# Patient Record
Sex: FEMALE | Race: BLACK OR AFRICAN AMERICAN | NOT HISPANIC OR LATINO | Employment: FULL TIME | URBAN - METROPOLITAN AREA
[De-identification: names, ages, dates, MRNs, and addresses within clinical notes are randomized per-mention and may not be internally consistent; named-entity substitution may affect disease eponyms.]

---

## 2017-03-03 ENCOUNTER — OFFICE VISIT (OUTPATIENT)
Dept: FAMILY MEDICINE | Facility: CLINIC | Age: 32
End: 2017-03-03

## 2017-03-03 VITALS
HEIGHT: 65 IN | TEMPERATURE: 97.7 F | DIASTOLIC BLOOD PRESSURE: 79 MMHG | BODY MASS INDEX: 39.42 KG/M2 | OXYGEN SATURATION: 98 % | WEIGHT: 236.6 LBS | HEART RATE: 97 BPM | SYSTOLIC BLOOD PRESSURE: 136 MMHG

## 2017-03-03 DIAGNOSIS — Z00.00 ROUTINE GENERAL MEDICAL EXAMINATION AT A HEALTH CARE FACILITY: Primary | ICD-10-CM

## 2017-03-03 LAB — HIV 1+2 AB+HIV1 P24 AG SERPL QL IA: NEGATIVE

## 2017-03-03 NOTE — PROGRESS NOTES
Female Physical Note    Concerns today: No special concerns today.    ROS:  CONSTITUTIONAL: no fatigue, no unexpected change in weight  SKIN: no worrisome rashes, no worrisome moles, no worrisome lesions  EYES: no acute vision problems or changes  ENT: no ear problems, no mouth problems, no throat problems  RESP: no significant cough, no shortness of breath  BREAST: no masses, no tenderness, no discharge  CV: no chest pain, no palpitations, no new or worsening peripheral edema  GI: Occasional nausea.No vomiting, no constipation, no diarrhea  GYN: dyspareunia with deep penetration, periods are regular and not too painful   : no frequency, no dysuria, no hematuria  MS: no claudication, no myalgias, no joint aches  NEURO: Occasional dizziness. no weakness, no syncope, no headaches  ENDOCRINE: no temperature intolerance, no skin/hair changes  HEME: no easy bruising, no bleeding problems  PSYCHIATRIC: NEGATIVE for changes in mood or affect    Sexually Active: Yes  Sexual concerns: No   Contraception: patch   P:   Menarche: 11 yo Patient's last menstrual period was 2017 (exact date). Menses: q 28 days  STD History: Pos   Last Pap Smear Date: 14, NSIL and neg HPV, due for repeat cotesting   Abnormal Pap History: 2/4/15 - atypical glandular cells; 3/11/15 - colposcopy without malignancy, recommended cotesting at 12 and 24 months.     Patient Active Problem List   Diagnosis     Tobacco dependence     History of snoring     Atypical glandular cells on cervical Pap smear     Morbid obesity (H)       Current Outpatient Prescriptions   Medication Sig Dispense Refill     norelgestromin-ethinyl estradiol (ORTHO EVRA) 150-35 MCG/24HR patch Place 1 patch onto the skin every 7 days 9 patch 12       Past Medical History   Diagnosis Date     History of colposcopy with cervical biopsy age 14     History of snoring 2015     Tobacco dependence 2015        Family History     Problem (# of Occurrences) Relation  (Name,Age of Onset)    CANCER (1) Maternal Grandfather    Colon Cancer (1) Maternal Grandmother    Coronary Artery Disease (2) Mother, Father    Heart Failure (2) Mother, Father: living    Hypertension (1) Mother: living    Liver Disease (1) Mother    Myocardial Infarction (1) Maternal Aunt: 2 aunts  in 50's     Ovarian Cancer (1) Sister    Pacemaker (1) Father       Negative family history of: DIABETES, CEREBROVASCULAR DISEASE, Thyroid Disease, Breast Cancer          Problem List Medication List and Allergy List were reviewed and updated.    Patient is an established patient of this clinic.    Social History   Substance Use Topics     Smoking status: Current Some Day Smoker     Types: Cigarettes     Smokeless tobacco: Not on file      Comment: Smokes mostly on the weekends since age 14.      Alcohol use 1.2 oz/week     2 Standard drinks or equivalent per week      Comment: Occasionally.     Cohabiting   Children ? yes 2 children    Has anyone hurt you physically, for example by pushing, hitting, slapping or kicking you or forcing you to have sex? Denies  Do you feel threatened or controlled by a partner, ex-partner or anyone in your life? Denies    RISK BEHAVIORS AND HEALTHY HABITS:  Tobacco Use/Smoking: Yes, occasionally on weekend.   Illicit Drug Use: None  Do you use alcohol? Occasionally on weekends, maximum of 4 in one sitting.   Diet (5-7 servings of fruits/veg daily): No special diet, not enough fruits or vegetables    Exercise (30 min accumulated most days): No, has a gym membership   Dental Care: Yes   Calcium 1500 mg/d:  Yes  Seat Belt Use: Yes     Pap/HPV cotest every 5 years for women 30-65   Recommended and patient accepted testing. and HIV screening:  Recommended and patient accepted testing.    Immunization History   Administered Date(s) Administered     Tdap (Adacel,Boostrix) 2010    Reviewed Immunization Record Today    EXAMINATION:   /79 (BP Location: Right arm, Patient Position:  "Chair, Cuff Size: Adult Large)  Pulse 97  Temp 97.7  F (36.5  C) (Oral)  Ht 5' 5\" (165.1 cm)  Wt 236 lb 9.6 oz (107.3 kg)  LMP 02/11/2017 (Exact Date)  SpO2 98%  BMI 39.37 kg/m2  GENERAL: healthy, alert and no distress  EYES: Eyes grossly normal to inspection, extraocular movements - intact, and PERRL  HENT: ear canals- normal; TMs- normal; Nose- normal; Mouth- no ulcers, no lesions  NECK: no tenderness, no adenopathy, no asymmetry, no masses, no stiffness; thyroid- normal to palpation  RESP: lungs clear to auscultation - no rales, no rhonchi, no wheezes  BREAST: no masses, no tenderness, no nipple discharge, no palpable axillary masses or adenopathy  CV: regular rates and rhythm, normal S1 S2, no S3 or S4 and no murmur, no click or rub -  ABDOMEN: soft, no tenderness, no  hepatosplenomegaly, no masses, normal bowel sounds  MS: extremities- no gross deformities noted, no edema  SKIN: no suspicious lesions, no rashes  NEURO: strength and tone- normal, sensory exam- grossly normal, mentation- intact, speech- normal, reflexes- symmetric  - female: cervix- normal, adnexae- normal; uterus- normal, no masses, no discharge  PSYCH: Alert and oriented times 3; speech- coherent , normal rate and volume; able to articulate logical thoughts, able to abstract reason, no tangential thoughts, no hallucinations or delusions, affect- normal  LYMPHATICS: ant. cervical- normal, post. cervical- normal, axillary- normal, supraclavicular- normal, inguinal- normal    ASSESSMENT & PLAN    1. Routine general medical examination at a health care facility  - GYN Cytology ([facility])  - Chlamydia/Gono Amplified ([facility])  - HIV Ag/Ab Screen [place] ([facility])    2. Tobacco Use Disorder - recommended smoking cessation.     Will call with lab results. Return to clinic as needed.     Precepted with attending physician Dr. Joel Fortune.     Johnna Herndon DO    Preceptor Attestation:  I saw and evaluated the patient.  I reviewed the " resident physician's history, exam, and treatment plan; and I agree with the documentation by the resident physician.  Supervising Physician:  Joel Fortune MD

## 2017-03-03 NOTE — MR AVS SNAPSHOT
After Visit Summary   3/3/2017    Ning Giraldo    MRN: [id number]           Patient Information     Date Of Birth          1985        Visit Information        Provider Department      3/3/2017 3:40 PM Johnna Herndon, DO [facility]        Today's Diagnoses     Routine general medical examination at a health care facility    -  1      Care Instructions      Preventive Health Recommendations  Female Ages 26 - 39  Yearly exam:   See your health care provider every year in order to    Review health changes.     Discuss preventive care.      Review your medicines if you your doctor has prescribed any.    Until age 30: Get a Pap test every three years (more often if you have had an abnormal result).    After age 30: Talk to your doctor about whether you should have a Pap test every 3 years or have a Pap test with HPV screening every 5 years.   You do not need a Pap test if your uterus was removed (hysterectomy) and you have not had cancer.  You should be tested each year for STDs (sexually transmitted diseases), if you're at risk.   Talk to your provider about how often to have your cholesterol checked.  If you are at risk for diabetes, you should have a diabetes test (fasting glucose).  Shots: Get a flu shot each year. Get a tetanus shot every 10 years.   Nutrition:     Eat at least 5 servings of fruits and vegetables each day.    Eat whole-grain bread, whole-wheat pasta and brown rice instead of white grains and rice.    Talk to your provider about Calcium and Vitamin D.     Lifestyle    Exercise at least 150 minutes a week (30 minutes a day, 5 days of the week). This will help you control your weight and prevent disease.    Limit alcohol to one drink per day.    No smoking.     Wear sunscreen to prevent skin cancer.    See your dentist every six months for an exam and cleaning.          Follow-ups after your visit        Who to contact     Please call your clinic at [phone]  "to:    Ask questions about your health    Make or cancel appointments    Discuss your medicines    Learn about your test results    Speak to your doctor   If you have compliments or concerns about an experience at your clinic, or if you wish to file a complaint, please contact [facility] Patient Relations at [phone] or email us at [email or web address]         Additional Information About Your Visit        [employer] Information     [employer] gives you secure access to your electronic health record. If you see a primary care provider, you can also send messages to your care team and make appointments. If you have questions, please call your primary care clinic.  If you do not have a primary care provider, please call [phone] and they will assist you.      [employer] is an electronic gateway that provides easy, online access to your medical records. With [employer], you can request a clinic appointment, read your test results, renew a prescription or communicate with your care team.     To access your existing account, please contact your [facility] or call [phone] for assistance.        Care EveryWhere ID     This is your Care EveryWhere ID. This could be used by other organizations to access your [place] medical records  [id number]        Your Vitals Were     Pulse Temperature Height Last Period Pulse Oximetry BMI (Body Mass Index)    97 97.7  F (36.5  C) (Oral) 5' 5\" (165.1 cm) 02/11/2017 (Exact Date) 98% 39.37 kg/m2       Blood Pressure from Last 3 Encounters:   03/03/17 136/79   08/04/16 115/73   06/01/16 122/79    Weight from Last 3 Encounters:   03/03/17 236 lb 9.6 oz (107.3 kg)   08/04/16 250 lb 8 oz (113.6 kg)   06/01/16 267 lb 6.4 oz (121.3 kg)              We Performed the Following     Chlamydia/Gono Amplified ([facility])     GYN Cytology ([facility])     HIV Ag/Ab Screen [place] ([facility])          Today's Medication Changes    "       These changes are accurate as of: 3/3/17  4:40 PM.  If you have any questions, ask your nurse or doctor.               Stop taking these medicines if you haven't already. Please contact your care team if you have questions.     cholecalciferol 1000 UNIT tablet   Commonly known as:  vitamin D   Stopped by:  Johnna Herndon, DO           metroNIDAZOLE 500 MG tablet   Commonly known as:  FLAGYL   Stopped by:  Johnna Herndon,            vitamin D 2000 UNITS tablet   Stopped by:  Johnna Herndon,            vitamin D 88370 UNIT capsule   Commonly known as:  ERGOCALCIFEROL   Stopped by:  Johnna Herndon, DO                    Primary Care Provider Office Phone # Fax #    Sintia Morales [phone]       [facility] [address] [place] [address]        Thank you!     Thank you for choosing [facility]  for your care. Our goal is always to provide you with excellent care. Hearing back from our patients is one way we can continue to improve our services. Please take a few minutes to complete the written survey that you may receive in the mail after your visit with us. Thank you!             Your Updated Medication List - Protect others around you: Learn how to safely use, store and throw away your medicines at www.disposemymeds.org.          This list is accurate as of: 3/3/17  4:40 PM.  Always use your most recent med list.                   Brand Name Dispense Instructions for use    norelgestromin-ethinyl estradiol 150-35 MCG/24HR patch    ORTHO EVRA    9 patch    Place 1 patch onto the skin every 7 days

## 2017-03-06 LAB
C TRACH RRNA CVX QL NAA+PROBE: NEGATIVE
N GONORRHOEA RRNA SPEC QL NAA+PROBE: NEGATIVE

## 2017-03-09 LAB
INTERPRETATION: NORMAL
INTERPRETER: NORMAL

## 2017-03-13 ENCOUNTER — RECORDS - HEALTHEAST (OUTPATIENT)
Dept: ADMINISTRATIVE | Facility: OTHER | Age: 32
End: 2017-03-13

## 2017-03-13 DIAGNOSIS — Z30.49 ENCOUNTER FOR SURVEILLANCE OF OTHER CONTRACEPTIVE: ICD-10-CM

## 2017-03-13 LAB
HIGH RISK?: YES
HPV REFLEX?: NORMAL
LAB AP ABNORMAL BLEEDING: NO
LAB AP BIRTH CONTROL/HORMONES: NORMAL
LAB AP CASE REPORT: NORMAL
LAB AP CERVICAL APPEARANCE: NORMAL
LAB AP GYN INTERPRETATION: NORMAL
LAB AP MALIGNANT?: NORMAL
LAB AP PATIENT STATUS: NORMAL
LAB AP PREVIOUS ABNL: NORMAL
LAB AP PREVIOUS NORMAL: NORMAL
LAST MENS PERIOD: NORMAL
SPECIMEN ADEQUACY:: NORMAL

## 2017-03-14 RX ORDER — NORELGESTROMIN AND ETHINYL ESTRADIOL 35; 150 UG/MG; UG/MG
1 PATCH TRANSDERMAL
Qty: 9 PATCH | Refills: 5 | Status: SHIPPED
Start: 2017-03-14 | End: 2018-01-19

## 2017-09-20 ENCOUNTER — OFFICE VISIT (OUTPATIENT)
Dept: FAMILY MEDICINE | Facility: CLINIC | Age: 32
End: 2017-09-20

## 2017-09-20 VITALS
DIASTOLIC BLOOD PRESSURE: 79 MMHG | RESPIRATION RATE: 20 BRPM | SYSTOLIC BLOOD PRESSURE: 122 MMHG | HEIGHT: 65 IN | WEIGHT: 226.4 LBS | BODY MASS INDEX: 37.72 KG/M2 | TEMPERATURE: 98 F | HEART RATE: 79 BPM | OXYGEN SATURATION: 100 %

## 2017-09-20 DIAGNOSIS — M79.604 PAIN OF RIGHT LOWER EXTREMITY: Primary | ICD-10-CM

## 2017-09-20 LAB
% GRANULOCYTES: 41.2 %G (ref 40–75)
GRANULOCYTES #: 2.1 K/UL (ref 1.6–8.3)
HCT VFR BLD AUTO: 40.7 % (ref 35–47)
HEMOGLOBIN: 12.4 G/DL (ref 11.7–15.7)
LYMPHOCYTES # BLD AUTO: 2.5 K/UL (ref 0.8–5.3)
LYMPHOCYTES NFR BLD AUTO: 49.7 %L (ref 20–48)
MCH RBC QN AUTO: 28.3 PG (ref 26.5–35)
MCHC RBC AUTO-ENTMCNC: 30.5 G/DL (ref 32–36)
MCV RBC AUTO: 93 FL (ref 78–100)
MID #: 0.5 K/UL (ref 0–2.2)
MID %: 9.1 %M (ref 0–20)
PLATELET # BLD AUTO: 324 K/UL (ref 150–450)
RBC # BLD AUTO: 4.38 M/UL (ref 3.8–5.2)
WBC # BLD AUTO: 5.1 K/UL (ref 4–11)

## 2017-09-20 NOTE — PROGRESS NOTES
"       HPI:   Ning Giraldo is a 31 year old  female who presents to clinic today for Pain of the right knee/anterior shin for approximately 2 week duration.  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  The pain is made worse with activity; however it continues to hurt at rest as well.  The pain is particularly worse with plantar flexion.    She has not had prolonged periods of bedrest.  She does not have any personal or family history of bleeding or clotting disorders.  She has been on the patch birth control for approximately 6 years.  She does smoke 1 pack per week, drinks approximately 6 drinks on the weekend 3 at a time, and does not use other drugs.    She has tried heat with little benefit.  She has not tried Tylenol, ibuprofen, or other therapies.    She has had some associated fatigue for approximately 1 week.  She denies fever, chills, joint swelling, hip or right ankle pain, rash, inguinal lymphadenopathy, or other symptoms. She has pain chronically in her left ankle.    She has a family member that has recently diagnosed with lymphoma after having \"atypical symptoms\" and wanted to get \"checked out\"    States she has had history of fracture of the left foot but has never been documented or seen by a physician.  This occurred following a fall.  She does not think of any one incident that started her current right knee pain.    Declines work note for any restrictions.    Patient is an established patient of this clinic.         PMHX:   Active Problems List  Patient Active Problem List   Diagnosis     Tobacco dependence     History of snoring     Atypical glandular cells on cervical Pap smear     Morbid obesity (H)     Active problem list reviewed and updated.    Current Medications  Current Outpatient Prescriptions   Medication Sig Dispense Refill     norelgestromin-ethinyl " "estradiol (ORTHO EVRA) 150-35 MCG/24HR patch Place 1 patch onto the skin every 7 days 9 patch 5     Medication list reviewed and updated.    Family History  No personal or family history of bleeding/clotting disorders known to patient    Family history reviewed and updated.  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  Social History   Substance Use Topics     Smoking status: Current Some Day Smoker     Types: Cigarettes     Smokeless tobacco: Not on file      Comment: Occasionally.      Alcohol use      6 Standard drinks or equivalent per week      Comment: Occasionally.     History   Drug Use No     Allergies  No Known Allergies  Allergies and Medication Intolerances Updated         Physical Exam:     Vitals:    09/20/17 1621   BP: 122/79   BP Location: Right arm   Patient Position: Chair   Cuff Size: Adult Large   Pulse: 79   Resp: 20   Temp: 98  F (36.7  C)   TempSrc: Oral   SpO2: 100%   Weight: 226 lb 6.4 oz (102.7 kg)   Height: 5' 5.25\" (165.7 cm)     Body mass index is 37.39 kg/(m^2).    General: Obese female. Appears well and in no acute distress.  HEENT: Eyes grossly normal to inspection. Extraocular movements intact. Pupils equal, round, and reactive to light. Mucous membranes moist. No ulcers or lesions noted in the oropharynx.  Cardiovascular: Regular rate and rhythm, normal S1 and S2 without murmur. No extra heartsounds or friction rub. Radial pulses present and equal bilaterally.  Respiratory: Lungs clear to auscultation bilaterally. No wheezing or crackles. No prolonged expiration. Symmetrical chest rise.  Knee Musculoskeletal Exam:  Grossly normal in appearance. No overlying erythema or ecchymosis. No swelling noted. No tenderness to palpation over joint line. Full ROM with extension and flexion. Negative Lachman's and posterior drawer. No laxity with varus and valgus stress. Negative McMurrays. No patellar apprehension or pain with patellar compression. Pes anserine bursa " and fibular head nontender. Calves measure equal bilaterally.  Derm: No suspicious lesions or rashes of lower extremity.    Assessment and Plan   1. Pain of right lower extremity: Exam findings mostly benign. No overlying rash or focal tenderness. Unable to reproduce pain on exam and structures appear to be intact. Possibly overuse injury do to new job. No hx of trauma so fracture unlikely. Unlikely lymphoma, but can check CBC for patient reassurance. Unlikely DVT or thrombophlebitis given no prolonged periods of rest, leg swelling, or focal tenderness.  - CBC with Diff Plt ([facility]), f/u results  - Heat, tylenol, ibuprofen PRN for pain  - Return PRN if symptoms change or worsen for re-evaluation    Options for treatment and follow-up care were reviewed with the patient and/or guardian. Ning CANNON Giraldo and/or guardian engaged in the decision making process and verbalized understanding of the options discussed and agreed with the final plan.    Juan Holder MD  [facility] Resident  Pager# [phone]    Precepted with: Romaine Land MD      This chart is completed utilizing dictation software; typos and/or incorrect word substitutions may unintentionally occur.

## 2017-09-20 NOTE — MR AVS SNAPSHOT
After Visit Summary   9/20/2017    Ning Giraldo    MRN: [id number]           Patient Information     Date Of Birth          1985        Visit Information        Provider Department      9/20/2017 4:20 PM Juan Holder MD [facility]        Today's Diagnoses     Pain of right lower extremity    -  1      Care Instructions    Important Takeaway Points From This Visit:    We will call with your lab results    If your pain worsens, becomes more localized to one area, or you have new symptoms I'd like to see you back for another evaluation    You can take tylenol or ibuprofen as needed for pain    As always, please call with any questions or concerns. I look forward to seeing you again soon!    Take care,  Dr. Holder    Your current medication list is printed. Please keep this with you - it is helpful to bring this current list to any other medical appointments. It can also be helpful if you ever go to the emergency room or hospital.    If you had lab testing today we will call you with the results. The phone number we will call with your results is # [phone] (home) . If this is not the best number please call our clinic and change the number.    If you need any refills, please call your pharmacy and they will contact us.    If you have any further concerns or wish to schedule another appointment, please call our office at [phone] during normal business hours (8-5, M-F).    If you have urgent medical questions that cannot wait, you may call [phone] at any time of day.    If you have a medical emergency, please call [phone].    Thank you for coming to [facility].              Follow-ups after your visit        Who to contact     Please call your clinic at [phone] to:    Ask questions about your health    Make or cancel appointments    Discuss your medicines    Learn about your test results    Speak to your doctor   If you have compliments or concerns  "about an experience at your clinic, or if you wish to file a complaint, please contact [facility] Patient Relations at [phone] or email us at [email or web address]         Additional Information About Your Visit        [employer] Information     [employer] gives you secure access to your electronic health record. If you see a primary care provider, you can also send messages to your care team and make appointments. If you have questions, please call your primary care clinic.  If you do not have a primary care provider, please call [phone] and they will assist you.      [employer] is an electronic gateway that provides easy, online access to your medical records. With [employer], you can request a clinic appointment, read your test results, renew a prescription or communicate with your care team.     To access your existing account, please contact your [facility] or call [phone] for assistance.        Care EveryWhere ID     This is your Care EveryWhere ID. This could be used by other organizations to access your [place] medical records  [id number]        Your Vitals Were     Pulse Temperature Respirations Height Last Period Pulse Oximetry    79 98  F (36.7  C) (Oral) 20 5' 5.25\" (165.7 cm) 09/15/2017 100%    BMI (Body Mass Index)                   37.39 kg/m2            Blood Pressure from Last 3 Encounters:   09/20/17 122/79   03/03/17 136/79   08/04/16 115/73    Weight from Last 3 Encounters:   09/20/17 226 lb 6.4 oz (102.7 kg)   03/03/17 236 lb 9.6 oz (107.3 kg)   08/04/16 250 lb 8 oz (113.6 kg)              We Performed the Following     CBC with Diff Plt ([facility])        Primary Care Provider Office Phone # Fax #    Katie Rangel [phone]       [facility] [address] [place] [address]        Equal Access to Services     SAMUEL HOLBROOK AH: Hadii david riverao Soomaali, waaxda luqadaha, qaybta " maddi haroandrey miguel. So [facility] [phone].    ATENCIÓN: Si julito loyola, tiene a gardner disposición servicios gratuitos de asistencia lingüística. David al [phone].    We comply with applicable federal civil rights laws and [place] laws. We do not discriminate on the basis of race, color, national origin, age, disability sex, sexual orientation or gender identity.            Thank you!     Thank you for choosing [facility]  for your care. Our goal is always to provide you with excellent care. Hearing back from our patients is one way we can continue to improve our services. Please take a few minutes to complete the written survey that you may receive in the mail after your visit with us. Thank you!             Your Updated Medication List - Protect others around you: Learn how to safely use, store and throw away your medicines at www.disposemymeds.org.          This list is accurate as of: 9/20/17 11:59 PM.  Always use your most recent med list.                   Brand Name Dispense Instructions for use Diagnosis    norelgestromin-ethinyl estradiol 150-35 MCG/24HR patch    ORTHO EVRA    9 patch    Place 1 patch onto the skin every 7 days    Encounter for surveillance of other contraceptive

## 2017-09-20 NOTE — PATIENT INSTRUCTIONS
Important Takeaway Points From This Visit:    We will call with your lab results    If your pain worsens, becomes more localized to one area, or you have new symptoms I'd like to see you back for another evaluation    You can take tylenol or ibuprofen as needed for pain    As always, please call with any questions or concerns. I look forward to seeing you again soon!    Take care,  Dr. Holder    Your current medication list is printed. Please keep this with you - it is helpful to bring this current list to any other medical appointments. It can also be helpful if you ever go to the emergency room or hospital.    If you had lab testing today we will call you with the results. The phone number we will call with your results is # [phone] (home) . If this is not the best number please call our clinic and change the number.    If you need any refills, please call your pharmacy and they will contact us.    If you have any further concerns or wish to schedule another appointment, please call our office at [phone] during normal business hours (8-5, M-F).    If you have urgent medical questions that cannot wait, you may call [phone] at any time of day.    If you have a medical emergency, please call [phone].    Thank you for coming to [facility].

## 2017-09-20 NOTE — PROGRESS NOTES
Preceptor Attestation:  Patient's case reviewed and discussed with Juan Holder MD Patient seen and discussed with the resident.. I agree with assessment and plan of care.  Supervising Physician:  Romaine Land MD  [facility]

## 2017-11-17 ENCOUNTER — OFFICE VISIT (OUTPATIENT)
Dept: FAMILY MEDICINE | Facility: CLINIC | Age: 32
End: 2017-11-17

## 2017-11-17 VITALS
TEMPERATURE: 98.1 F | BODY MASS INDEX: 36.74 KG/M2 | OXYGEN SATURATION: 98 % | WEIGHT: 228.6 LBS | DIASTOLIC BLOOD PRESSURE: 78 MMHG | SYSTOLIC BLOOD PRESSURE: 118 MMHG | HEART RATE: 90 BPM | HEIGHT: 66 IN

## 2017-11-17 DIAGNOSIS — M25.571 PAIN IN JOINT, ANKLE AND FOOT, RIGHT: ICD-10-CM

## 2017-11-17 DIAGNOSIS — M25.50 MULTIPLE JOINT PAIN: Primary | ICD-10-CM

## 2017-11-17 DIAGNOSIS — N92.6 IRREGULAR MENSES: ICD-10-CM

## 2017-11-17 NOTE — PATIENT INSTRUCTIONS
- take ibuprofen or tylenol   - ice, rest at the end of the day when able  - we will call you next week to set up an appointment for physical therapy  - keep taking the patch for birth control

## 2017-11-17 NOTE — PROGRESS NOTES
"   Subjective:   Ning Giraldo is a 31 year old  female with obesity who presents for:    Joint pain, leg pain, bones  - all joints, 1.5 weeks, worse with cold weather  - both anterior lower legs, left elbow; none in hands  - worse at end of days, improves with relaxing over weekend, worse throughout the week  - h/o right ankle injury years ago falling down stairs; on feet a lot; never saw doctor for this after injuries; some swelling  - advil less than once per day, OTC x 1 pill; tylenol doesn't work  - ice, elevate, rest on weekends    Abnormal bleeding  - light bleeding x 2-3 wks  - on patch for BC but off x 1 month, restarted last week; LMP 1 week ago 11/11, was only 2 days of spotting; last month had spotting -then real period  - some cramping with period  - sister with ovarian cancer at age 40    Sleep   - tired, trouble breathing at night every once in a while    Tobacco  - 1 PPweek    Labs  - cousin with leukemia; concerned about abnormal tests    Social/other: works with adults with disabilities, [employer]    ROS negative other than mentioned in HPI   History and medications listed below  Objective:   /78  Pulse 90  Temp 98.1  F (36.7  C) (Oral)  Ht 5' 5.5\" (166.4 cm)  Wt 228 lb 9.6 oz (103.7 kg)  LMP 11/11/2017  SpO2 98%  BMI 37.46 kg/m2  Body mass index is 37.46 kg/(m^2).  Gen: alert, NAD  HENT: oral mucosa moist  Card: RRR, no murmurs  Resp: CTAB, no wheezing or crackles  Abd: soft; nontender  MS: extremities grossly normal; no swelling, erythema, or joint line tenderness in hands, elbows, or knees  Ext: no LE edema; no right ankle swelling, some tenderness of right medial ankle along talotibial joint line; flat feet  Skin: no rashes on exposed skin  Neuro: mentation intact, speech normal  Psych: mood normal, affect normal  Assessment and Plan     1. Multiple limb pain  Describes as joint pain, but also muscle/limb pain without clear pattern at this point. No signs/symptoms to suggest " rheumatoid arthritis or even osteoarthritis at this time. 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   - ibuprofen/tylenol for pain prn  - ice/rest as able    2. Pain in joint, ankle and foot, right  Remote history of 2 injuries that sound minor, likely sprains/strains. However, continues to have intermittent pain and swelling. Xray would not  as she has been walking on it for years and exam is not concerning.  - PHYSICAL THERAPY REFERRAL  - supportive shoes/insoles    3. Irregular menses  Not truly irregular. Normal variation with birth control. Not interested in other options.   - continue patch; should quit smoking in next year or so if wants to continue using estrogen-containing contraception    Follow up: in 1-2 weeks as able to discuss sleep issues and tobacco cessation    Katie Rangel MD, MPH  [facility] Medicine Resident    Precepted with: Beulah Leon MD    Options for treatment and follow-up care were reviewed with the patient and/or guardian. Ning CANNON Giraldo and/or guardian engaged in the decision making process and verbalized understanding of the options discussed and agreed with the final plan.  PMHX:     Patient Active Problem List   Diagnosis     Tobacco dependence     History of snoring     Atypical glandular cells on cervical Pap smear     Morbid obesity (H)     Current Outpatient Prescriptions   Medication Sig Dispense Refill     norelgestromin-ethinyl estradiol (ORTHO EVRA) 150-35 MCG/24HR patch Place 1 patch onto the skin every 7 days 9 patch 5     Family History   Problem Relation Age of Onset     Colon Cancer Maternal Grandmother      Heart Failure Mother      Liver Disease Mother      Hypertension Mother      living     Coronary Artery Disease Mother      Heart Failure Father      living     Coronary Artery Disease Father      Pacemaker Father      Myocardial Infarction Maternal Aunt      2 aunts  in 50's      Ovarian Cancer Sister       CANCER Maternal Grandfather      DIABETES No family hx of      CEREBROVASCULAR DISEASE No family hx of      Thyroid Disease No family hx of      Breast Cancer No family hx of      Social History     Social History Narrative    Children: Jayesh Stevo 5yo, Tyrses Stevo 14yo     No Known Allergies  No results found for this or any previous visit (from the past 24 hour(s)).

## 2017-11-17 NOTE — MR AVS SNAPSHOT
After Visit Summary   11/17/2017    Ning Giraldo    MRN: [id number]           Patient Information     Date Of Birth          1985        Visit Information        Provider Department      11/17/2017 3:40 PM Katie Rangel MD [facility]        Care Instructions    - take ibuprofen or tylenol   - ice, rest at the end of the day when able  - we will call you next week to set up an appointment for physical therapy  - keep taking the patch for birth control          Follow-ups after your visit        Who to contact     Please call your clinic at [phone] to:    Ask questions about your health    Make or cancel appointments    Discuss your medicines    Learn about your test results    Speak to your doctor   If you have compliments or concerns about an experience at your clinic, or if you wish to file a complaint, please contact [facility] Patient Relations at [phone] or email us at [email or web address]         Additional Information About Your Visit        MyChart Information     ecomomt gives you secure access to your electronic health record. If you see a primary care provider, you can also send messages to your care team and make appointments. If you have questions, please call your primary care clinic.  If you do not have a primary care provider, please call [phone] and they will assist you.      LIQVID is an electronic gateway that provides easy, online access to your medical records. With LIQVID, you can request a clinic appointment, read your test results, renew a prescription or communicate with your care team.     To access your existing account, please contact your [facility] Clinic or call [phone] for assistance.        Care EveryWhere ID     This is your Care EveryWhere ID. This could be used by other organizations to access your [place] medical records  [id number]        Your Vitals Were   "   Pulse Temperature Height Last Period Pulse Oximetry BMI (Body Mass Index)    90 98.1  F (36.7  C) (Oral) 5' 5.5\" (166.4 cm) 11/11/2017 98% 37.46 kg/m2       Blood Pressure from Last 3 Encounters:   11/17/17 118/78   09/20/17 122/79   03/03/17 136/79    Weight from Last 3 Encounters:   11/17/17 228 lb 9.6 oz (103.7 kg)   09/20/17 226 lb 6.4 oz (102.7 kg)   03/03/17 236 lb 9.6 oz (107.3 kg)              Today, you had the following     No orders found for display       Primary Care Provider Office Phone # Fax #    Katie Rangel [phone]       [facility] [address] [place] [address]        Equal Access to Services     [facility]: Hadii aad ku hadasho S[place]eter, waaxda luqadaha, qaybta kaalmada adeegyada, maddi riddle . So [facility] [phone].    ATENCIÓN: Si habla español, tiene a gardner disposición servicios gratuitos de asistencia lingüística. Llame al [phone].    We comply with applicable federal civil rights laws and [place] laws. We do not discriminate on the basis of race, color, national origin, age, disability, sex, sexual orientation, or gender identity.            Thank you!     Thank you for choosing [facility]  for your care. Our goal is always to provide you with excellent care. Hearing back from our patients is one way we can continue to improve our services. Please take a few minutes to complete the written survey that you may receive in the mail after your visit with us. Thank you!             Your Updated Medication List - Protect others around you: Learn how to safely use, store and throw away your medicines at www.disposemymeds.org.          This list is accurate as of: 11/17/17  5:12 PM.  Always use your most recent med list.                   Brand Name Dispense Instructions for use Diagnosis    norelgestromin-ethinyl estradiol 150-35 MCG/24HR patch    ORTHO EVRA    9 patch    Place 1 patch onto the skin " every 7 days    Encounter for surveillance of other contraceptive

## 2017-11-20 NOTE — PROGRESS NOTES
Preceptor Attestation:  Patient's case reviewed and discussed with Katie Rangel MD resident and I evaluated the patient. I agree with written assessment and plan of care.  Supervising Physician:  SAL CABEZAS MD  [facility]

## 2018-01-15 ENCOUNTER — TELEPHONE (OUTPATIENT)
Dept: FAMILY MEDICINE | Facility: CLINIC | Age: 33
End: 2018-01-15

## 2018-01-15 NOTE — TELEPHONE ENCOUNTER
Ed follow up    Pt seen on 1.9 at [facility] foot/ankle pain - possible sprain    Unable to reach pt but left message  krystle

## 2018-01-15 NOTE — TELEPHONE ENCOUNTER
ED follow up    2nd attempt to reach pt for follow up on ankle and foot pain  Seen at [facility] and has a hx of ankle and joint pain    lbetz

## 2018-01-16 ENCOUNTER — TELEPHONE (OUTPATIENT)
Dept: FAMILY MEDICINE | Facility: CLINIC | Age: 33
End: 2018-01-16

## 2018-01-16 NOTE — TELEPHONE ENCOUNTER
ED follow up      Attempted to reach pt for follow up on visit to [facility] ED  Had to leave message to see how ankle is post sprain    Lbetz

## 2018-01-19 ENCOUNTER — OFFICE VISIT (OUTPATIENT)
Dept: FAMILY MEDICINE | Facility: CLINIC | Age: 33
End: 2018-01-19
Payer: COMMERCIAL

## 2018-01-19 VITALS
WEIGHT: 233.6 LBS | BODY MASS INDEX: 37.54 KG/M2 | OXYGEN SATURATION: 100 % | HEART RATE: 73 BPM | HEIGHT: 66 IN | DIASTOLIC BLOOD PRESSURE: 85 MMHG | SYSTOLIC BLOOD PRESSURE: 135 MMHG | TEMPERATURE: 97.5 F

## 2018-01-19 DIAGNOSIS — M21.42 ACQUIRED PES PLANUS OF BOTH FEET: ICD-10-CM

## 2018-01-19 DIAGNOSIS — E66.01 MORBID OBESITY (H): ICD-10-CM

## 2018-01-19 DIAGNOSIS — Z30.49 ENCOUNTER FOR SURVEILLANCE OF OTHER CONTRACEPTIVE: ICD-10-CM

## 2018-01-19 DIAGNOSIS — M21.41 ACQUIRED PES PLANUS OF BOTH FEET: ICD-10-CM

## 2018-01-19 DIAGNOSIS — M25.571 PAIN IN JOINT INVOLVING ANKLE AND FOOT, RIGHT: Primary | ICD-10-CM

## 2018-01-19 RX ORDER — NORELGESTROMIN AND ETHINYL ESTRADIOL 35; 150 UG/MG; UG/MG
1 PATCH TRANSDERMAL
Qty: 9 PATCH | Refills: 1 | Status: SHIPPED | OUTPATIENT
Start: 2018-01-19 | End: 2018-04-19

## 2018-01-19 NOTE — PROGRESS NOTES
"   Subjective:   Ning Giraldo is a 32 year old  female who presents for:    EDF for R ankle renard  - 1/9 ED visit . Had x-ray that showed no fracture or dislocation.  - Ankle has been injured years ago falling down stairs. Decided to go in because it bothered her in the night with aching and shooting pain. 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 Took 1 pill/day. Ice and epson salt baths help as well  - Improved somewhat since ED visit. No longer bothering her at night.   - Worse in the middle of the week  - Hasn't tried PT before  - Some numbness in foot    ROS negative other than mentioned in HPI   History and medications listed below  Objective:   /85 (BP Location: Right arm, Patient Position: Chair, Cuff Size: Adult Large)  Pulse 73  Temp 97.5  F (36.4  C) (Oral)  Ht 5' 6.25\" (168.3 cm)  Wt 233 lb 9.6 oz (106 kg)  LMP 01/05/2018  SpO2 100%  BMI 37.42 kg/m2  Body mass index is 37.42 kg/(m^2).  Gen: no distress,   Ext: Some pain to palpation of the R medial malleolus / medial right tibiotalar joint line. Full ROM of R ankle. No obvious swelling. No pain on palpation of tendons or ligaments. No laxity with anterior or posterior drawer  Cards; RRR, no murmur  Resp: clear, no wheezing  Assessment and Plan   1. Pain in joint involving ankle and foot, right; Acquired pes planus of both feet  Since her visit to the ED her ankle has returned to the baseline pain she has had for the past several years since sustaining a fall on the stairs. Has been to clinic in the past (most recently in Nov) for this pain and it was recommended to get inserts for arch supports and " referral for PT was placed. It seems her pain is due to chronic injury that hasn't recovered in combination with pes planus and morbid obesity causing further stress on the ankle. Discussed getting arch supports and attending PT and she is interested in this. Also discussed weight-loss. 10/10 enthusiastic about PT.  - PHYSICAL THERAPY REFERRAL  - superfeet orthotics  - supportive shoe  - wear shoes inside  - weight loss  - Can continue supportive measures: Advil, ice    2. Encounter for surveillance of other contraceptive  Currently on OCP. Refilled today for 6mo and counseled that she no longer could be on this form at the age of 33 given that she is a smoker. Also discussed smoking cessation. Will follow-up to discuss smoking cessation and birth control options  - norelgestromin-ethinyl estradiol (ORTHO EVRA) 150-35 MCG/24HR patch; Place 1 patch onto the skin every 7 days  Dispense: 9 patch; Refill: 1    3. Tobacco dependence  Needs to quit by next visit or change to different birth control. Discussed risk of blood clot. Encouraged setting a quit date and coming in to discuss cessation aids. Smokes infrequently socially.    Follow up: about 6 mo    Katie Szymanksi MD, MPH  [facility] Resident     This note is scribed by Selin Barnhart, medical student on behalf of KATIE SZYMANSKI.    Precepted with: Hanna Palacio MD    Options for treatment and follow-up care were reviewed with the patient and/or guardian. Ning CANNON Giraldo and/or guardian engaged in the decision making process and verbalized understanding of the options discussed and agreed with the final plan.  PMHX:     Patient Active Problem List   Diagnosis     Tobacco dependence     History of snoring     Atypical glandular cells on cervical Pap smear     Morbid obesity (H)     Current Outpatient Prescriptions   Medication Sig Dispense Refill     norelgestromin-ethinyl estradiol (ORTHO EVRA) 150-35 MCG/24HR patch Place 1 patch onto the skin  every 7 days 9 patch 5     Family History   Problem Relation Age of Onset     Colon Cancer Maternal Grandmother      Heart Failure Mother      Liver Disease Mother      Hypertension Mother      living     Coronary Artery Disease Mother      Heart Failure Father      living     Coronary Artery Disease Father      Pacemaker Father      Myocardial Infarction Maternal Aunt      2 aunts  in 50's      Ovarian Cancer Sister      CANCER Maternal Grandfather      DIABETES No family hx of      CEREBROVASCULAR DISEASE No family hx of      Thyroid Disease No family hx of      Breast Cancer No family hx of      Social History     Social History Narrative    Children: Jayesh Stevo 5yo, Tyrses Stevo 12yo     No Known Allergies  No results found for this or any previous visit (from the past 24 hour(s)).

## 2018-01-19 NOTE — PROGRESS NOTES
Preceptor Attestation:  Patient's case reviewed and discussed with  Patient seen and discussed with the resident.  I agree with written assessment and plan of care.  Supervising Physician:  Hanna Palacio MD  [facility]

## 2018-01-19 NOTE — MR AVS SNAPSHOT
"              After Visit Summary   1/19/2018    Ning Giraldo    MRN: [id number]           Patient Information     Date Of Birth          1985        Visit Information        Provider Department      1/19/2018 4:20 PM Katie Rangel MD [facility]        Today's Diagnoses     Pain in joint involving ankle and foot, right    -  1    Morbid obesity (H)        Encounter for surveillance of other contraceptive          Care Instructions    - we will call you to set up a physical therapy appointment. Please call the clinic to check in on this if you haven't heard from us by Wednesday of next week.   - try the most supportive arch supports from \"Superfeet\" (I think the green ones)  - continue using ice for 20 minutes at least daily, more often if possible  - okay to use tylenol or ibuprofen 2-3 times per day if helpful          Follow-ups after your visit        Additional Services     PHYSICAL THERAPY REFERRAL       PT/OT REFERRAL  Nign Giraldo  1985  Phone #: [phone] (home)    needed: No  Language: English    PT/OT  Facility:   [facility], P: [phone], F: [phone]    History: ankle injury 2 years ago, persistent pain with weightbearing and walking    Precautions/Contraindications: None    Imaging Studies: X-Ray    Surgical Procedure/Test Results: None    Treatment Goals:   Increase: Flexibility and Strength  Decrease: Pain    Prognosis: good    Visits: Up to 12    Evaluate: Evaluate and treat    Plan: Manual Therapy, Flexibility Exercise and Strength Exercise    Use of Ionto/Phonophoresis Approved - patient requires a separate medication prescription. Dexamethasone 4 mg/ml injectable - 10cc                  Follow-up notes from your care team     Return if symptoms worsen or fail to improve, for Follow up ankle pain.      Who to contact     Please call your clinic at [phone] to:    Ask questions about your health    Make or cancel " "appointments    Discuss your medicines    Learn about your test results    Speak to your doctor   If you have compliments or concerns about an experience at your clinic, or if you wish to file a complaint, please contact [facility] Patient Relations at [phone] or email us at [email or web address]         Additional Information About Your Visit        ERPLYhart Information     Prosonixt gives you secure access to your electronic health record. If you see a primary care provider, you can also send messages to your care team and make appointments. If you have questions, please call your primary care clinic.  If you do not have a primary care provider, please call [phone] and they will assist you.      Desk is an electronic gateway that provides easy, online access to your medical records. With Desk, you can request a clinic appointment, read your test results, renew a prescription or communicate with your care team.     To access your existing account, please contact your [facility] or call [phone] for assistance.        Care EveryWhere ID     This is your Care EveryWhere ID. This could be used by other organizations to access your [place] medical records  [id number]        Your Vitals Were     Pulse Temperature Height Last Period Pulse Oximetry BMI (Body Mass Index)    73 97.5  F (36.4  C) (Oral) 5' 6.25\" (168.3 cm) 01/05/2018 100% 37.42 kg/m2       Blood Pressure from Last 3 Encounters:   01/19/18 135/85   11/17/17 118/78   09/20/17 122/79    Weight from Last 3 Encounters:   01/19/18 233 lb 9.6 oz (106 kg)   11/17/17 228 lb 9.6 oz (103.7 kg)   09/20/17 226 lb 6.4 oz (102.7 kg)              We Performed the Following     PHYSICAL THERAPY REFERRAL          Where to get your medicines      These medications were sent to [facility] [id number] - [place], [place] - [address] AT [facility]  [address], [place] [address]     Phone:  [phone]     norelgestromin-ethinyl estradiol 150-35 MCG/24HR patch          Primary Care Provider Office Phone # Fax #    Katie Emili Rangel [phone]       [facility] [address] [place] [address] [address] [place] [address]        Equal Access to Services     [facility]: Hadii aad ku hadasho Soomaali, waaxda luqadaha, qaybta kaalmada adeegyada, waxay idiin hayaan adeeg kharash la'aan ah. So [facility] [phone].    ATENCIÓN: Si habla español, tiene a gardner disposición servicios gratuitos de asistencia lingüística. [facility] [phone].    We comply with applicable federal civil rights laws and [place] laws. We do not discriminate on the basis of race, color, national origin, age, disability, sex, sexual orientation, or gender identity.            Thank you!     Thank you for choosing [facility]  for your care. Our goal is always to provide you with excellent care. Hearing back from our patients is one way we can continue to improve our services. Please take a few minutes to complete the written survey that you may receive in the mail after your visit with us. Thank you!             Your Updated Medication List - Protect others around you: Learn how to safely use, store and throw away your medicines at www.disposemymeds.org.          This list is accurate as of: 1/19/18  5:46 PM.  Always use your most recent med list.                   Brand Name Dispense Instructions for use Diagnosis    norelgestromin-ethinyl estradiol 150-35 MCG/24HR patch    ORTHO EVRA    9 patch    Place 1 patch onto the skin every 7 days    Encounter for surveillance of other contraceptive

## 2018-01-19 NOTE — PATIENT INSTRUCTIONS
"- we will call you to set up a physical therapy appointment. Please call the clinic to check in on this if you haven't heard from us by Wednesday of next week.   - try the most supportive arch supports from \"Superfeet\" (I think the green ones)  - continue using ice for 20 minutes at least daily, more often if possible  - okay to use tylenol or ibuprofen 2-3 times per day if helpful  "

## 2018-01-20 PROBLEM — M21.42 ACQUIRED PES PLANUS OF BOTH FEET: Status: ACTIVE | Noted: 2018-01-20

## 2018-01-20 PROBLEM — Z30.49 ENCOUNTER FOR SURVEILLANCE OF OTHER CONTRACEPTIVE: Status: ACTIVE | Noted: 2018-01-20

## 2018-01-20 PROBLEM — M21.41 ACQUIRED PES PLANUS OF BOTH FEET: Status: ACTIVE | Noted: 2018-01-20

## 2018-01-23 ENCOUNTER — AMBULATORY - HEALTHEAST (OUTPATIENT)
Dept: ADMINISTRATIVE | Facility: REHABILITATION | Age: 33
End: 2018-01-23

## 2018-01-23 DIAGNOSIS — M25.579 PAIN IN JOINT INVOLVING ANKLE AND FOOT: ICD-10-CM

## 2018-03-23 ENCOUNTER — OFFICE VISIT (OUTPATIENT)
Dept: FAMILY MEDICINE | Facility: CLINIC | Age: 33
End: 2018-03-23
Payer: COMMERCIAL

## 2018-03-23 VITALS
TEMPERATURE: 97.9 F | WEIGHT: 233 LBS | DIASTOLIC BLOOD PRESSURE: 93 MMHG | HEIGHT: 66 IN | OXYGEN SATURATION: 99 % | HEART RATE: 83 BPM | BODY MASS INDEX: 37.45 KG/M2 | SYSTOLIC BLOOD PRESSURE: 139 MMHG | RESPIRATION RATE: 16 BRPM

## 2018-03-23 DIAGNOSIS — Z00.00 ROUTINE GENERAL MEDICAL EXAMINATION AT A HEALTH CARE FACILITY: Primary | ICD-10-CM

## 2018-03-23 DIAGNOSIS — R35.89 POLYURIA: ICD-10-CM

## 2018-03-23 DIAGNOSIS — R73.03 PREDIABETES: ICD-10-CM

## 2018-03-23 DIAGNOSIS — E66.01 MORBID OBESITY (H): ICD-10-CM

## 2018-03-23 DIAGNOSIS — Z11.3 ROUTINE SCREENING FOR STI (SEXUALLY TRANSMITTED INFECTION): ICD-10-CM

## 2018-03-23 DIAGNOSIS — B35.4 TINEA CORPORIS: ICD-10-CM

## 2018-03-23 LAB
BUN SERPL-MCNC: 8 MG/DL (ref 5–24)
CALCIUM SERPL-MCNC: 8.8 MG/DL (ref 8.5–10.4)
CHLORIDE SERPLBLD-SCNC: 102 MMOL/L (ref 94–109)
CHOLEST SERPL-MCNC: 108 MG/DL
CHOLEST/HDLC SERPL: 1.5 RATIO
CO2 SERPL-SCNC: 28 MMOL/L (ref 20–32)
CREAT SERPL-MCNC: 0.8 MG/DL (ref 0.6–1.3)
EGFR CALCULATED (BLACK REFERENCE): >90 ML/MIN
EGFR CALCULATED (NON BLACK REFERENCE): 87.3 ML/MIN
GLUCOSE SERPL-MCNC: 102 MG/DL (ref 60–109)
HBA1C MFR BLD: 5.4 % (ref 4.1–5.7)
HDLC SERPL-MCNC: 74 MG/DL
LDLC SERPL CALC-MCNC: 3 MG/DL (ref 0–99)
POTASSIUM SERPL-SCNC: 3.7 MMOL/L (ref 3.4–5.3)
SODIUM SERPL-SCNC: 138 MMOL/L (ref 133–144)
TRIGL SERPL-MCNC: 157 MG/DL
VLDL-CHOLESTEROL: 31 MG/DL (ref 7–32)

## 2018-03-23 RX ORDER — PRENATAL VIT 91/IRON/FOLIC/DHA 28-975-200
COMBINATION PACKAGE (EA) ORAL 2 TIMES DAILY
Qty: 42 G | Refills: 1 | Status: SHIPPED | OUTPATIENT
Start: 2018-03-23 | End: 2018-07-25

## 2018-03-23 NOTE — PROGRESS NOTES
"Female Physical Note     Concerns today: Rash, Foot/ankle pain, Abdominal pain, STI check     HPI:   Ning Giraldo is a 32 year old F with a PMHx including morbid obesity, tobacco dependence, abnormal pap smear who presents today for complete physical exam.      1. Rash:   About two weeks ago patient noticed rash over right posterior thigh. Mildly itchy, no pain. Started small but has since been enlarging. Thinks it might be ringworm as her boyfriend's daughter had ringworm on her leg about 1 month ago. Never had ringworm in the past.      2. Chronic foot and ankle pain:   Has had chronic bilateral foot and ankle pain attributed to pes planus. Pain is worse with activity and standing. Has used orthotics with some relief, however, not wearing them recently. Also better with rest.      3. Abdominal pain:   After giving birth to her last child in 1/2011 she has had a \"knot\" in her mid abdomen. She notes that it feels sore when she moves in certain ways or is bloated and eats a lot, Also sometimes associated with periods, and associated with spicy foods.      4. STI Check  Is not concerned for STI at this time but likes to be checked for them yearly.      ROS:  CONSTITUTIONAL: no fever/chills, fatigue, no unexpected weight loss  SKIN: Positive for rashes, no worrisome moles, no worrisome lesions  EYES: no acute vision problems or changes  ENT: Positive for rhinorrhea. no ear problems, no mouth problems, no throat problems  RESP: no significant cough, no shortness of breath  CV: no chest pain, no palpitations, no new or worsening peripheral edema  GI: no nausea, no vomiting, no diarrhea  : Increased urinary frequency, no dysuria, no hematuria  MS: no claudication, no myalgias, no joint aches  NEURO: no weakness, no dizziness, no syncope, no headaches  ENDOCRINE: no temperature intolerance, no skin/hair changes  HEME: no easy bruising, no bleeding problems  PSYCHIATRIC: NEGATIVE for changes in mood or affect     Sexually " Active: Yes  Sexual concerns: No   Contraception:Details: Patch   P: 2  Menarche: Age 12, LMP: end of February, Menses: q ~1 month, lasts 5 days  STD History: Pos (Chlamydia, Gonorrhea)   Last Pap Smear Date: 14, NSIL and negative HPV   Abnormal Pap History: 2/4/15 - atypical glandular cells; 3/11/15 - colposcopy without malignancy, recommended cotesting at 12 and 24 months.          Patient Active Problem List   Diagnosis     Tobacco dependence     History of snoring     Atypical glandular cells on cervical Pap smear     Morbid obesity (H)     Pain in joint involving ankle and foot, right     Acquired pes planus of both feet     Encounter for surveillance of other contraceptive          Current Outpatient Prescriptions           Current Outpatient Prescriptions   Medication Sig Dispense Refill     norelgestromin-ethinyl estradiol (ORTHO EVRA) 150-35 MCG/24HR patch Place 1 patch onto the skin every 7 days 9 patch 1             Past Medical History         Past Medical History:   Diagnosis Date     History of colposcopy with cervical biopsy age 14     History of snoring 2015     Tobacco dependence 2015              Family History         Problem (# of Occurrences) Relation (Name,Age of Onset)     CANCER (1) Maternal Grandfather     Colon Cancer (1) Maternal Grandmother     Coronary Artery Disease (2) Mother, Father     Heart Failure (2) Mother, Father: living     Hypertension (1) Mother: living     Liver Disease (1) Mother     Myocardial Infarction (1) Maternal Aunt: 2 aunts  in 50's      Ovarian Cancer (1) Sister     Pacemaker (1) Father            Negative family history of: DIABETES, CEREBROVASCULAR DISEASE, Thyroid Disease, Breast Cancer                Problem List Medication List and Allergy List were reviewed.     Patient is an established patient of this clinic..             Social History   Substance Use Topics     Smoking status: Current Some Day Smoker       Types: Cigarettes      "Smokeless tobacco: Never Used         Comment: about a pack a week     Alcohol use 1.2 oz/week        2 Standard drinks or equivalent per week          Comment: Occasionally.      Partnered  Children ? Yes, 2     Has anyone hurt you physically, for example by pushing, hitting, slapping or kicking you or forcing you to have sex? Denies  Do you feel threatened or controlled by a partner, ex-partner or anyone in your life? Denies     RISK BEHAVIORS AND HEALTHY HABITS:  Tobacco Use/Smokin packs/week  Illicit Drug Use: None  Do you use alcohol? Yes, up to 6 drinks over the weekend   Diet (5-7 servings of fruits/veg daily): No   Exercise (30 min accumulated most days):No  Dental Care: Yes, saw dentist last week, scheduled to go back for fillings, also needs to see oral surgery for a cyst   Calcium 1500 mg/d:  No  Seat Belt Use: Yes      Pap/HPV cotest every 5 years for women 30-65   Date done 2016  Result(s) negative  HIV screening:  Date done 3/2017  Result(s) Negative     CV Risk based on Pooled Cohort Risk:  10-year risk of atherosclerotic cardiovascular disease: 0.6%      Pooled Cohort Risk Calculator          Immunization History   Administered Date(s) Administered     Tdap (Adacel,Boostrix) 2010    Reviewed Immunization Record Today     EXAMINATION:   /88 (BP Location: Right arm, Patient Position: Chair, Cuff Size: Adult Large)  Pulse 83  Temp 97.9  F (36.6  C) (Oral)  Resp 16  Ht 5' 6.14\" (168 cm)  Wt 233 lb (105.7 kg)  SpO2 99%  BMI 37.45 kg/m2  GENERAL: healthy, alert and no distress  EYES: Eyes grossly normal to inspection, extraocular movements - intact  HENT: ear canals- normal; Nose- normal; Mouth- no ulcers, no lesions  RESP: lungs clear to auscultation - no rales, no rhonchi, no wheezes  CV: regular rates and rhythm, normal S1 S2, no S3 or S4 and no murmur, no click or rub -  ABDOMEN: soft, no tenderness, no hepatosplenomegaly, no masses, normal bowel sounds. No palpable hernia when " patient was asked to perform abdominal crunch.   MS: extremities- no gross deformities noted, no edema  SKIN: Erythematous macule with raised and darkened borders, circular abour 2.5 cm in diameter.   NEURO: strength and tone- normal, sensory exam- grossly normal, mentation- intact, speech- normal  PSYCH: Alert and oriented times 3; speech- coherent , normal rate and volume; able to articulate logical thoughts, able to abstract reason, no tangential thoughts, no hallucinations or delusions, affect- normal     ASSESSMENT/PLAN:  1. Health Care Maintenance: Normal Physical Exam.     2. Tinea corporis  - terbinafine 1% cream bid until rash resolves     3. Foot/ankle pain:   - given information regarding orthotics at the [facility]   - encouraged weight loss     4. Abdominal pain   Abdominal examination benign, patient well appearing in NAD. Large ddx at this time including: GERD, biliary colic, constipation, menstrual pain, muscle cramps.  - ibuprofen + simethicone/gas-x prn during menstrual cycles  - TUMS/maalox prn when exacerbated by eating  - if pain becomes persistent, present for further evaluation.     5. Pre-Hypertension  - DASH diet + exercise discussed in detail     6. Pre-diabetes  HgbA1c 5.7 in February 2015. Morbidly obese. Polyuria.  - HgbA1c  - bmp  - lipid panel     7. Contraception  On combined hormonal patch. Tobacco use with 2 packs/week. No personal or family hx of DVT/PE. Given patient is <35 this is category 2 on CDC contraception chart; benefits>risks at this time.  - encouraged tobacco cessation    8. STI screen  Has been with the same male partner for many years and is not involved in high risk sexual behavior. HIV, HepB, HepC, RPR negative in 2015. Asymptomatic today. Hx of GC/chlamydia infection in the past.  - GC/Chlamydia only today    RTC in 2-4 weeks to discuss tobacco use, weight loss, and other issues.     Scribe Disclosure:   OLEGARIO, Fran Chavez, am serving as a scribe; to document  services personally performed by Dr. Ni Colon- -based on data collection and the provider's statements to me.      Provider Disclosure:  I agree with above History, Review of Systems, Physical exam and Plan.  I have reviewed the content of the documentation and have edited it as needed. I have personally performed the services documented here and the documentation accurately represents those services and the decisions I have made.       Precepted with: MD Ni Weiner MD (PGY2)  Pager: [phone]  [facility] Resident

## 2018-03-23 NOTE — MR AVS SNAPSHOT
After Visit Summary   3/23/2018    Ning Giraldo    MRN: [id number]           Patient Information     Date Of Birth          1985        Visit Information        Provider Department      3/23/2018 1:20 PM Ni Colon MD [facility]        Today's Diagnoses     Routine general medical examination at a health care facility    -  1    Routine screening for STI (sexually transmitted infection)        Polyuria        Tinea corporis        Morbid obesity (H)        Prediabetes          Care Instructions      Preventive Health Recommendations  Female Ages 26 - 39  Yearly exam:   See your health care provider every year in order to    Review health changes.     Discuss preventive care.      Review your medicines if you your doctor has prescribed any.    Until age 30: Get a Pap test every three years (more often if you have had an abnormal result).    After age 30: Talk to your doctor about whether you should have a Pap test every 3 years or have a Pap test with HPV screening every 5 years.   You do not need a Pap test if your uterus was removed (hysterectomy) and you have not had cancer.  You should be tested each year for STDs (sexually transmitted diseases), if you're at risk.   Talk to your provider about how often to have your cholesterol checked.  If you are at risk for diabetes, you should have a diabetes test (fasting glucose).  Shots: Get a flu shot each year. Get a tetanus shot every 10 years.   Nutrition:     Eat at least 5 servings of fruits and vegetables each day.    Eat whole-grain bread, whole-wheat pasta and brown rice instead of white grains and rice.    Talk to your provider about Calcium and Vitamin D.     Lifestyle    Exercise at least 150 minutes a week (30 minutes a day, 5 days of the week). This will help you control your weight and prevent disease.    Limit alcohol to one drink per day.    No smoking.     Wear sunscreen to prevent skin cancer.    See your  "dentist every six months for an exam and cleaning.            Follow-ups after your visit        Who to contact     Please call your clinic at [phone] to:    Ask questions about your health    Make or cancel appointments    Discuss your medicines    Learn about your test results    Speak to your doctor            Additional Information About Your Visit        MyChart Information     [employer] gives you secure access to your electronic health record. If you see a primary care provider, you can also send messages to your care team and make appointments. If you have questions, please call your primary care clinic.  If you do not have a primary care provider, please call [phone] and they will assist you.      [employer] is an electronic gateway that provides easy, online access to your medical records. With [employer], you can request a clinic appointment, read your test results, renew a prescription or communicate with your care team.     To access your existing account, please contact your [facility] or call [phone] for assistance.        Care EveryWhere ID     This is your Care EveryWhere ID. This could be used by other organizations to access your [facility] medical records  [id number]        Your Vitals Were     Pulse Temperature Respirations Height Last Period Pulse Oximetry    83 97.9  F (36.6  C) (Oral) 16 5' 6.14\" (168 cm) 02/28/2018 99%    BMI (Body Mass Index)                   37.45 kg/m2            Blood Pressure from Last 3 Encounters:   03/23/18 (!) 139/93   01/19/18 135/85   11/17/17 118/78    Weight from Last 3 Encounters:   03/23/18 233 lb (105.7 kg)   01/19/18 233 lb 9.6 oz (106 kg)   11/17/17 228 lb 9.6 oz (103.7 kg)              We Performed the Following     Basic Metabolic Panel ([facility]) - Results < 1 hr     Chlamydia/Gono Amplified ([facility])     Hemoglobin A1c ([facility])     Lipid Panel ([facility]) - Results < 1 hr          Today's Medication Changes        "   These changes are accurate as of 3/23/18  2:29 PM.  If you have any questions, ask your nurse or doctor.               Start taking these medicines.        Dose/Directions    terbinafine 1 % cream   Commonly known as:  lamISIL AT   Used for:  Tinea corporis   Started by:  Ni Colon MD        Apply topically 2 times daily   Quantity:  42 g   Refills:  1            Where to get your medicines      These medications were sent to [id number] - [place], [place] - [address] AT [facility] & [address], [place] [address]     Phone:  [phone]     terbinafine 1 % cream                Primary Care Provider Office Phone # Fax #    Katie Emili Rangel [phone]       [facility] [address] [place] [address]        Equal Access to Services     [facility]: Hadii aad ku hadasho Soomaali, waaxda luqadaha, qaybta kaalmada adeegyada, waxay idiin hayaan mario alberto kharaolga riddle . So [facility] [phone].    ATENCIÓN: Si habla español, tiene a gardner disposición servicios gratuitos de asistencia lingüística. Llame al [phone].    We comply with applicable federal civil rights laws and [place] laws. We do not discriminate on the basis of race, color, national origin, age, disability, sex, sexual orientation, or gender identity.            Thank you!     Thank you for choosing [facility]  for your care. Our goal is always to provide you with excellent care. Hearing back from our patients is one way we can continue to improve our services. Please take a few minutes to complete the written survey that you may receive in the mail after your visit with us. Thank you!             Your Updated Medication List - Protect others around you: Learn how to safely use, store and throw away your medicines at www.disposemymeds.org.          This list is accurate as of 3/23/18  2:29 PM.  Always use your most recent med list.                    Brand Name Dispense Instructions for use Diagnosis    norelgestromin-ethinyl estradiol 150-35 MCG/24HR patch    ORTHO EVRA    9 patch    Place 1 patch onto the skin every 7 days    Encounter for surveillance of other contraceptive       terbinafine 1 % cream    lamISIL AT    42 g    Apply topically 2 times daily    Tinea corporis

## 2018-03-23 NOTE — LETTER
March 26, 2018      Ning Giraldo  [address]   [place] [address]        Dear Ning,    It was good to see you in clinic the other day. Your cholesterol, diabetes, kidney, and electrolyte testing were all normal, which is great. I look forward to working with you more in the near future. Please call the clinic with any further questions or concerns.    Please see below for your test results.    Resulted Orders   Lipid Panel ([facility]) - Results < 1 hr   Result Value Ref Range    Cholesterol 108.0 <200.0 mg/dL    Triglycerides 157.0 (H) <150.0 mg/dL    HDL Cholesterol 74.0 >50.0 mg/dL      Comment:      If diabetic or CVD then reference range <100    VLDL-Cholesterol 31.0 7.0 - 32.0 mg/dL    LDL Cholesterol Direct 3.0 0.0 - 99.0 mg/dL    Cholesterol/HDL Ratio 1.5 <5.0 RATIO   Basic Metabolic Panel ([facility]) - Results < 1 hr   Result Value Ref Range    Glucose 102.0 60.0 - 109.0 mg/dL    Urea Nitrogen 8.0 5.0 - 24.0 mg/dL    Creatinine 0.8 0.6 - 1.3 mg/dL    Sodium 138.0 133.0 - 144.0 mmol/L    Potassium 3.7 3.4 - 5.3 mmol/L    Chloride 102.0 94.0 - 109.0 mmol/L    Carbon Dioxide 28.0 20.0 - 32.0 mmol/L    Calcium 8.8 8.5 - 10.4 mg/dL    eGFR Calculated (Non Black Reference) 87.3 >60.0 mL/min    eGFR Calculated (Black Reference) >90 >60.0 mL/min   Hemoglobin A1c ([facility])   Result Value Ref Range    Hemoglobin A1C 5.4 4.1 - 5.7 %       If you have any questions, please call the clinic to make an appointment.    Sincerely,    Ni Colon MD

## 2018-03-23 NOTE — LETTER
March 30, 2018      Ning Giraldo  [address]   [place] [address]        Dear Ning,    Please see below for your test results.    Resulted Orders   Chlamydia/Gono Amplified ([facility])   Result Value Ref Range    Chlamydia trac,Amplified Prb Negative Negative    N gonorrhoeae,Amplified Prb Negative Negative    Narrative    Test performed by:  [facility]  [address] [place] [address]   Lipid Panel ([facility]) - Results < 1 hr   Result Value Ref Range    Cholesterol 108.0 <200.0 mg/dL    Triglycerides 157.0 (H) <150.0 mg/dL    HDL Cholesterol 74.0 >50.0 mg/dL      Comment:      If diabetic or CVD then reference range <100    VLDL-Cholesterol 31.0 7.0 - 32.0 mg/dL    LDL Cholesterol Direct 3.0 0.0 - 99.0 mg/dL    Cholesterol/HDL Ratio 1.5 <5.0 RATIO   Basic Metabolic Panel ([facility]) - Results < 1 hr   Result Value Ref Range    Glucose 102.0 60.0 - 109.0 mg/dL    Urea Nitrogen 8.0 5.0 - 24.0 mg/dL    Creatinine 0.8 0.6 - 1.3 mg/dL    Sodium 138.0 133.0 - 144.0 mmol/L    Potassium 3.7 3.4 - 5.3 mmol/L    Chloride 102.0 94.0 - 109.0 mmol/L    Carbon Dioxide 28.0 20.0 - 32.0 mmol/L    Calcium 8.8 8.5 - 10.4 mg/dL    eGFR Calculated (Non Black Reference) 87.3 >60.0 mL/min    eGFR Calculated (Black Reference) >90 >60.0 mL/min   Hemoglobin A1c ([facility])   Result Value Ref Range    Hemoglobin A1C 5.4 4.1 - 5.7 %       If you have any questions, please call the clinic to make an appointment.    Sincerely,    Ni Colon MD

## 2018-03-26 LAB
C TRACH RRNA SPEC QL NAA+PROBE: NEGATIVE
N GONORRHOEA RRNA SPEC QL NAA+PROBE: NEGATIVE

## 2018-04-19 ENCOUNTER — OFFICE VISIT (OUTPATIENT)
Dept: FAMILY MEDICINE | Facility: CLINIC | Age: 33
End: 2018-04-19
Payer: COMMERCIAL

## 2018-04-19 VITALS
DIASTOLIC BLOOD PRESSURE: 83 MMHG | OXYGEN SATURATION: 97 % | TEMPERATURE: 97.8 F | HEART RATE: 77 BPM | SYSTOLIC BLOOD PRESSURE: 132 MMHG

## 2018-04-19 DIAGNOSIS — Z30.49 ENCOUNTER FOR SURVEILLANCE OF OTHER CONTRACEPTIVE: ICD-10-CM

## 2018-04-19 DIAGNOSIS — R21 RASH AND NONSPECIFIC SKIN ERUPTION: Primary | ICD-10-CM

## 2018-04-19 RX ORDER — NORELGESTROMIN AND ETHINYL ESTRADIOL 35; 150 UG/MG; UG/MG
1 PATCH TRANSDERMAL
Qty: 9 PATCH | Refills: 3 | Status: SHIPPED | OUTPATIENT
Start: 2018-04-19 | End: 2018-06-21

## 2018-04-19 RX ORDER — CLOTRIMAZOLE AND BETAMETHASONE DIPROPIONATE 10; .64 MG/G; MG/G
CREAM TOPICAL 2 TIMES DAILY
Qty: 45 G | Refills: 1 | Status: SHIPPED | OUTPATIENT
Start: 2018-04-19 | End: 2018-06-21

## 2018-04-19 NOTE — PROGRESS NOTES
HPI:       Ning Giraldo is a 32 year old  Female who presents for follow up of concern(s) listed below    1. Rash: the cream was not helpful, more spots, has been itchy, uses aloe vera, she is concerned it is spreading.    2. Contraception. She is requesting her birth control patch, discussed potential adverse reactions.  On combined hormonal patch. Tobacco use with 2 packs/week. No personal or family hx of DVT/PE. Given patient is <35 this is category 2 on CDC contraception chart; benefits>risks at this time.         PMHX:     Patient Active Problem List   Diagnosis     Tobacco dependence     History of snoring     Atypical glandular cells on cervical Pap smear     Morbid obesity (H)     Pain in joint involving ankle and foot, right     Acquired pes planus of both feet     Encounter for surveillance of other contraceptive       Current Outpatient Prescriptions   Medication Sig Dispense Refill     clotrimazole-betamethasone (LOTRISONE) cream Apply topically 2 times daily 45 g 1     norelgestromin-ethinyl estradiol (ORTHO EVRA) 150-35 MCG/24HR patch Place 1 patch onto the skin every 7 days 9 patch 3     terbinafine (LAMISIL AT) 1 % cream Apply topically 2 times daily 42 g 1            No Known Allergies    No results found for this or any previous visit (from the past 24 hour(s)).           Physical Exam:     Vitals:    04/19/18 1632   BP: 132/83   Pulse: 77   Temp: 97.8  F (36.6  C)   TempSrc: Oral   SpO2: 97%     There is no height or weight on file to calculate BMI.    GENERAL APPEARANCE: healthy, alert and no distress,  SKIN: Hypopigmented circular patches on her forearms and thighs,       Assessment and Plan     1. Rash and nonspecific skin eruption  - clotrimazole-betamethasone (LOTRISONE) cream; Apply topically 2 times daily  Dispense: 45 g; Refill: 1    2. Encounter for surveillance of other contraceptive  -Encouraged her to stop smoking  - norelgestromin-ethinyl estradiol (ORTHO EVRA) 150-35  MCG/24HR patch; Place 1 patch onto the skin every 7 days  Dispense: 9 patch; Refill: 3      Options for treatment and follow-up care were reviewed with the patient and/or guardian. Ning Giraldo and/or guardian engaged in the decision making process and verbalized understanding of the options discussed and agreed with the final plan.    Patrica Sandoval, DO

## 2018-04-19 NOTE — MR AVS SNAPSHOT
After Visit Summary   4/19/2018    Ning Giraldo    MRN: [id number]           Patient Information     Date Of Birth          1985        Visit Information        Provider Department      4/19/2018 4:20 PM Budd, Jennifer, DO [facility]        Today's Diagnoses     Rash and nonspecific skin eruption    -  1    Encounter for surveillance of other contraceptive           Follow-ups after your visit        Who to contact     Please call your clinic at [phone] to:    Ask questions about your health    Make or cancel appointments    Discuss your medicines    Learn about your test results    Speak to your doctor            Additional Information About Your Visit        MyChart Information     [employer] gives you secure access to your electronic health record. If you see a primary care provider, you can also send messages to your care team and make appointments. If you have questions, please call your primary care clinic.  If you do not have a primary care provider, please call [phone] and they will assist you.      [employer] is an electronic gateway that provides easy, online access to your medical records. With [employer], you can request a clinic appointment, read your test results, renew a prescription or communicate with your care team.     To access your existing account, please contact your [facility] or call [phone] for assistance.        Care EveryWhere ID     This is your Care EveryWhere ID. This could be used by other organizations to access your [facility] medical records  [id number]        Your Vitals Were     Pulse Temperature Pulse Oximetry             77 97.8  F (36.6  C) (Oral) 97%          Blood Pressure from Last 3 Encounters:   04/19/18 132/83   03/23/18 (!) 139/93   01/19/18 135/85    Weight from Last 3 Encounters:   03/23/18 233 lb (105.7 kg)   01/19/18 233 lb 9.6 oz (106 kg)   11/17/17 228 lb 9.6 oz (103.7 kg)              Today, you  had the following     No orders found for display         Today's Medication Changes          These changes are accurate as of 4/19/18  5:06 PM.  If you have any questions, ask your nurse or doctor.               Start taking these medicines.        Dose/Directions    clotrimazole-betamethasone cream   Commonly known as:  LOTRISONE   Used for:  Rash and nonspecific skin eruption   Started by:  Patrica Sandoval, DO        Apply topically 2 times daily   Quantity:  45 g   Refills:  1            Where to get your medicines      These medications were sent to [id number] - [place], [place] - [address] AT [facility] & [address], [place] [address]     Phone:  [phone]     clotrimazole-betamethasone cream    norelgestromin-ethinyl estradiol 150-35 MCG/24HR patch                Primary Care Provider Office Phone # Fax #    Katie Rangel [phone]       [facility] [address] [place] [address]        Equal Access to Services     [facility]: Hadii david ku hadasho Soomaali, waaxda luqadaha, qaybta kaalmada adeegyada, waxay idiin hayarieln mario alberto riddle . So [facility] [phone].    ATENCIÓN: Si habla español, tiene a gardner disposición servicios gratuitos de asistencia lingüística. [facility] [phone].    We comply with applicable federal civil rights laws and [place] laws. We do not discriminate on the basis of race, color, national origin, age, disability, sex, sexual orientation, or gender identity.            Thank you!     Thank you for choosing [facility]  for your care. Our goal is always to provide you with excellent care. Hearing back from our patients is one way we can continue to improve our services. Please take a few minutes to complete the written survey that you may receive in the mail after your visit with us. Thank you!             Your Updated Medication List - Protect others around you:  Learn how to safely use, store and throw away your medicines at www.disposemymeds.org.          This list is accurate as of 4/19/18  5:06 PM.  Always use your most recent med list.                   Brand Name Dispense Instructions for use Diagnosis    clotrimazole-betamethasone cream    LOTRISONE    45 g    Apply topically 2 times daily    Rash and nonspecific skin eruption       norelgestromin-ethinyl estradiol 150-35 MCG/24HR patch    ORTHO EVRA    9 patch    Place 1 patch onto the skin every 7 days    Encounter for surveillance of other contraceptive       terbinafine 1 % cream    lamISIL AT    42 g    Apply topically 2 times daily    Tinea corporis

## 2018-05-04 NOTE — PROGRESS NOTES
Preceptor Attestation:  Patient's case reviewed and discussed with  Patient seen and discussed with the resident..  I agree with written assessment and plan of care.  Supervising Physician:  Albina Velasquez MD  [facility]

## 2018-06-21 ENCOUNTER — OFFICE VISIT (OUTPATIENT)
Dept: FAMILY MEDICINE | Facility: CLINIC | Age: 33
End: 2018-06-21
Payer: COMMERCIAL

## 2018-06-21 VITALS
BODY MASS INDEX: 39.18 KG/M2 | HEART RATE: 75 BPM | DIASTOLIC BLOOD PRESSURE: 71 MMHG | OXYGEN SATURATION: 100 % | WEIGHT: 243.8 LBS | TEMPERATURE: 98 F | SYSTOLIC BLOOD PRESSURE: 110 MMHG

## 2018-06-21 DIAGNOSIS — Z13.9 SCREENING FOR CONDITION: Primary | ICD-10-CM

## 2018-06-21 DIAGNOSIS — N93.9 VAGINAL BLEEDING: ICD-10-CM

## 2018-06-21 DIAGNOSIS — Z34.81 ENCOUNTER FOR SUPERVISION OF OTHER NORMAL PREGNANCY IN FIRST TRIMESTER: ICD-10-CM

## 2018-06-21 LAB — B-HCG SERPL-ACNC: ABNORMAL MLU/ML (ref 0–4)

## 2018-06-21 RX ORDER — PRENATAL VIT/IRON FUM/FOLIC AC 27MG-0.8MG
1 TABLET ORAL DAILY
Qty: 100 TABLET | Refills: 3 | Status: SHIPPED | OUTPATIENT
Start: 2018-06-21 | End: 2018-12-19

## 2018-06-21 NOTE — LETTER
June 26, 2018      Ning Giraldo  [address]   [place] [address]        Dear Ning,  Your test for gonorrhea and chlamydia is negative. Pregnancy hormone level is consistent with 6-7 weeks pregnant and will need to be rechecked on Monday.     Thank you   Please see below for your test results.    Resulted Orders   Chlamydia/Gono Amplified ([employer])   Result Value Ref Range    Chlamydia trac,Amplified Prb Negative Negative    N gonorrhoeae,Amplified Prb Negative Negative    Narrative    Test performed by:  [facility] [address] [place] [address]   Beta-HCG Quantitative ([facility])   Result Value Ref Range    Beta hCG, Quantitative 29020 (H) 0 - 4 mlU/mL    Narrative    Test performed by:  [facility] [address] [place] [address]  Non-pregnant female . . . . . . . . . . . . . 0-4 (<5) mIU/mL  Equivocal result for early pregnancy . . . . 5-24 mIU/mL  Pregnant Female:  -------------------------------------------------------------  Weeks Post LMP Approximate HCG range(mIU/mL)  (Last Menstrual Period)range  -------------------------------------------------------------  3-4 Weeks . . . . . . . 9- 130 mIU/mL  4-5 Weeks . . . . . . . 75- 2,600 mIU/mL  5-6 Weeks . . . . . . 850- 20,800 mIU/mL  6-7 Weeks . . . . . 4,000-100,200 mIU/mL  7-12 Weeks . . . . . 11,500-289,000 mIU/mL  12-16 Weeks . . . . . 18,300-137,000 mIU/mL  16-29 Weeks. . . . . . 1,400- 53,000 mIU/mL  (2nd Trimester)  29-41 Weeks. . . . . . . 940- 60,000 mIU/mL  (3rd Trimester)  INTERPRETIVE NOTE:  For diagnostic purposes, hCG results should be used  conjunction with other data.  If the hCG level is inconsistent with clinical evidence,  results should be confirmed by an alternate hCG method.  This assay is approved for use in the early detection  of pregnancy only. It is not approved for any other  uses such as tumor marker screening or monitoring.  Beta HCG ranges were updated 2/2007 to  reflect  methodology referencing to the 4th World Health  Organization (WHO) International Standard.       If you have any questions, please call the clinic to make an appointment.    Sincerely,    Davion Ewing MD

## 2018-06-21 NOTE — PROGRESS NOTES
HPI       Ning Giraldo is a 32 year old  female without a significant past medical history who presents requesting a pregnancy test. LMP 4/29/2018 sure.  Contraceptive method : Patch but did has not been wearing it.  Symptoms of pregnancy: fatigue and breast tenderness  If pregnant, pregnancy is Unplanned, Undesired    1. Vaginal bleeding  -painless vaginal bleeding has been going on for a days  -never had spotting in pregnancies before  -no pain  -ER visit with dating US confirmed intrauterine preg    2. Concerns for STD- patient worries that her partner may have chlamydia. She herself was treated previously and would like to be tested today.     3. UTI  -still taking macrobid for UTI, 2 days left  -no symptoms, no fevers        was used for  this visit.     Obstetric History     No data available          Patient Active Problem List   Diagnosis     Tobacco dependence     History of snoring     Atypical glandular cells on cervical Pap smear     Morbid obesity (H)     Pain in joint involving ankle and foot, right     Acquired pes planus of both feet     Encounter for surveillance of other contraceptive       Current Outpatient Prescriptions   Medication Sig Dispense Refill     clotrimazole-betamethasone (LOTRISONE) cream Apply topically 2 times daily 45 g 1     norelgestromin-ethinyl estradiol (ORTHO EVRA) 150-35 MCG/24HR patch Place 1 patch onto the skin every 7 days 9 patch 3     terbinafine (LAMISIL AT) 1 % cream Apply topically 2 times daily 42 g 1       Active medical problems and medication list reviewed      No Known Allergies           Review of Systems:   CONSTITUTIONAL: no fatigue, no unexpected change in weight  SKIN: no worrisome rashes or lesions  EYES: no acute vision problems or changes  ENT: no ear problems, no mouth problems, no throat problems  RESP: no significant cough, no shortness of breath  CV: no chest pain, no palpitations, no new or worsening peripheral edema  GI: no  nausea, no vomiting, no constipation, no diarrhea  : no frequency, no dysuria, no hematuria  NEURO: no weakness, no dizziness, no headaches  ENDOCRINE: no temperature intolerance, no skin/hair changes  PSYCHIATRIC: NEGATIVE for changes in mood or trouble with sleep            Physical Exam:     Vitals:    06/21/18 1634   BP: 110/71   Pulse: 75   Temp: 98  F (36.7  C)   TempSrc: Oral   SpO2: 100%   Weight: 243 lb 12.8 oz (110.6 kg)       GENERAL: healthy, alert, well nourished, well hydrated, no distress  RESP: lungs clear to auscultation - no rales, no rhonchi, no wheezes  CV: regular rates and rhythm, normal S1 S2, no S3 or S4 and no murmur, no click or rubs  ABDOMEN: soft, no tenderness, no  hepatosplenomegaly, no masses, normal bowel sounds  : normal external genitalia, white discharge. Cervix closed no blood visualized.    No visits with results within 1 Day(s) from this visit.  Latest known visit with results is:    Office Visit on 03/23/2018   Component Date Value Ref Range Status     Chlamydia trac,Amplified Prb 03/23/2018 Negative  Negative Final     N gonorrhoeae,Amplified Prb 03/23/2018 Negative  Negative Final     Cholesterol 03/23/2018 108.0  <200.0 mg/dL Final     Triglycerides 03/23/2018 157.0* <150.0 mg/dL Final     HDL Cholesterol 03/23/2018 74.0  >50.0 mg/dL Final    If diabetic or CVD then reference range <100     VLDL-Cholesterol 03/23/2018 31.0  7.0 - 32.0 mg/dL Final     LDL Cholesterol Direct 03/23/2018 3.0  0.0 - 99.0 mg/dL Final     Cholesterol/HDL Ratio 03/23/2018 1.5  <5.0 RATIO Final     Glucose 03/23/2018 102.0  60.0 - 109.0 mg/dL Final     Urea Nitrogen 03/23/2018 8.0  5.0 - 24.0 mg/dL Final     Creatinine 03/23/2018 0.8  0.6 - 1.3 mg/dL Final     Sodium 03/23/2018 138.0  133.0 - 144.0 mmol/L Final     Potassium 03/23/2018 3.7  3.4 - 5.3 mmol/L Final     Chloride 03/23/2018 102.0  94.0 - 109.0 mmol/L Final     Carbon Dioxide 03/23/2018 28.0  20.0 - 32.0 mmol/L Final     Calcium  "2018 8.8  8.5 - 10.4 mg/dL Final     eGFR Calculated (Non Black Referen* 2018 87.3  >60.0 mL/min Final     eGFR Calculated (Black Reference) 2018 >90  >60.0 mL/min Final     Hemoglobin A1C 2018 5.4  4.1 - 5.7 % Final       Assessment and Plan   A 32 year old    with US confirmed pregnancy at 6 weeks consistent with LMP presents with vaginal bleeding. Cervix closed, no blood visualized. Will check HCG today and Monday. Follow up US ordered.   Will also check GC chlamydia due to patient concerns.   Starting prenatal vitamin    Advised to finish her course of antibiotics and will need UC to test for bacteriuria after treatment.     Open to following at [facility] with Dr. Hartman.     Reviewed pregnancy options. Uncertain if pregnancy is desired but says she wants to \"see it through to the end\"  Will plan to follow at [facility]  Early referral to OB? no   Initial OB labs will to be done at first OB visit, around 10 weeks gestation.  Dating ultrasound done  Prenatal vitamin prescribed.  OB intake to be done by OB coordinator via phone:.    Call or return to clinic if severe cramping or abdominal pain or any vaginal bleeding  Reviewed early pregnancy education regarding:nutrition, smoking, alcohol & drug use and pre-adi vitamins    Options for treatment and follow-up care were reviewed with the patient and/or guardian. Ning Giraldo and/or guardian engaged in the decision making process and verbalized understanding of the options discussed and agreed with the final plan.    Davion Ewing MD  [facility] Resident    Precepted with: Alexis Adkins MD    "

## 2018-06-22 ENCOUNTER — TELEPHONE (OUTPATIENT)
Dept: FAMILY MEDICINE | Facility: CLINIC | Age: 33
End: 2018-06-22

## 2018-06-22 LAB
C TRACH RRNA SPEC QL NAA+PROBE: NEGATIVE
N GONORRHOEA RRNA SPEC QL NAA+PROBE: NEGATIVE

## 2018-06-22 NOTE — TELEPHONE ENCOUNTER
Called patient to do OB intake with patient, schedule NOB with , and to schedule OB US. Patient did not answer, left VM to call clinic back. Cassandra MALIK

## 2018-06-25 NOTE — PROGRESS NOTES
Preceptor Attestation:   Patient seen, evaluated and discussed with the resident. I have verified the content of the note, which accurately reflects my assessment of the patient and the plan of care.    Supervising Physician:Alexis Adkins MD    [facility]

## 2018-07-02 NOTE — TELEPHONE ENCOUNTER
OB Intake completed over the phone with the patient. Ning is a 33yo english-speaking female  who currently works as a home health aide. Ning and baby's father Stepan, 29yo, are both excited for this baby. Pregnancy was unplanned but desired. Stepan currently works as a PCA. Ning's previous pregnancies were 03 and 11. The 15yo is a male, born at 9lb3oz via . Spinal anesthesia was used, baby was born at [facility] in [place], [place]. The 8yo child is also male, born at 8lb 05oz, no anesthesia was used, and was born at [facility] in [place], [place]. Ning reports tobacco use up until two weeks ago, she quit when she learned of her pregnancy. No complications noted for either pregnancy, delivery, or postpartum. Ning reports her LMP as 2018 with SRAVANTHI at . Ning is ok with a male OB provider, she will be seeing  for OB care.    Average Risk Category  No significant risk factors: No    At Risk Category (up to 3)  Teen pregnancy: No  Poor social situation: No  Domestic abuse: No  Financial difficulties: No  Smoker: Yes (previous smoke, quit two weeks ago)  H/O  deliver: No  H/O drug abuse: No  Non-English speaking: No  Advanced maternal age: No  GDM risks: No  Previous C/S: No  H/O PIH: No  H/O STIs: No  H/O mental health concerns: No  Onset care > 20 weeks: No  Other:     High Risk Category (4 or more At Risk or)  Diabetes/GDM: No  Multiple gestation: No  Chronic hypertension: No  Significant hx of asthma: No  Fetal demise > 20 weeks: No  Positive tox screen: No  Current mental health treatment: No  Other:     Risk: Average Risk   Date determined: 2018

## 2018-07-03 ENCOUNTER — OFFICE VISIT (OUTPATIENT)
Dept: FAMILY MEDICINE | Facility: CLINIC | Age: 33
End: 2018-07-03
Payer: COMMERCIAL

## 2018-07-03 VITALS
WEIGHT: 243.8 LBS | DIASTOLIC BLOOD PRESSURE: 86 MMHG | TEMPERATURE: 98 F | SYSTOLIC BLOOD PRESSURE: 123 MMHG | OXYGEN SATURATION: 99 % | BODY MASS INDEX: 39.18 KG/M2 | HEART RATE: 76 BPM | HEIGHT: 66 IN

## 2018-07-03 DIAGNOSIS — B37.31 CANDIDIASIS OF VAGINA: ICD-10-CM

## 2018-07-03 DIAGNOSIS — B96.89 BACTERIAL VAGINOSIS: ICD-10-CM

## 2018-07-03 DIAGNOSIS — N76.0 BACTERIAL VAGINOSIS: ICD-10-CM

## 2018-07-03 DIAGNOSIS — Z34.01 ENCOUNTER FOR SUPERVISION OF NORMAL FIRST PREGNANCY IN FIRST TRIMESTER: Primary | ICD-10-CM

## 2018-07-03 LAB
BACTERIA: NORMAL
BILIRUBIN UR: NEGATIVE
BLOOD UR: NEGATIVE
CLUE CELLS: NORMAL
GLUCOSE URINE: NEGATIVE
HEMOGLOBIN: 11.8 G/DL (ref 11.7–15.7)
HIV 1+2 AB+HIV1 P24 AG SERPL QL IA: NEGATIVE
KETONES UR QL: ABNORMAL
LEUKOCYTE ESTERASE UR: ABNORMAL
MOTILE TRICHOMONAS: NEGATIVE
NITRITE UR QL STRIP: NEGATIVE
ODOR: POSITIVE
PH UR STRIP: 6.5 [PH] (ref 5–7)
PH WET PREP: NORMAL
PROTEIN UR: NEGATIVE
SP GR UR STRIP: 1.02
UROBILINOGEN UR STRIP-ACNC: ABNORMAL
WBC WET PREP: NORMAL
YEAST: PRESENT

## 2018-07-03 RX ORDER — METRONIDAZOLE 500 MG/1
500 TABLET ORAL 2 TIMES DAILY
Qty: 21 TABLET | Refills: 0 | Status: SHIPPED | OUTPATIENT
Start: 2018-07-03 | End: 2018-07-25

## 2018-07-03 RX ORDER — CLOTRIMAZOLE 1 %
CREAM (GRAM) TOPICAL 2 TIMES DAILY
Qty: 24 G | Refills: 0 | Status: SHIPPED | OUTPATIENT
Start: 2018-07-03 | End: 2018-07-10

## 2018-07-03 NOTE — MR AVS SNAPSHOT
"              After Visit Summary   7/3/2018    Ning Giraldo    MRN: [id number]           Patient Information     Date Of Birth          1985        Visit Information        Provider Department      7/3/2018 4:00 PM Kyle Gee MD [facility]        Today's Diagnoses     Encounter for supervision of normal first pregnancy in first trimester    -  1       Follow-ups after your visit        Your next 10 appointments already scheduled     Aug 03, 2018  4:20 PM CDT   RETURN OB with Kyle Gee MD   [facility] ([facility])    [address] [place] [address]   [phone]              Who to contact     Please call your clinic at [phone] to:    Ask questions about your health    Make or cancel appointments    Discuss your medicines    Learn about your test results    Speak to your doctor            Additional Information About Your Visit        MyChart Information     [employer] gives you secure access to your electronic health record. If you see a primary care provider, you can also send messages to your care team and make appointments. If you have questions, please call your primary care clinic.  If you do not have a primary care provider, please call [phone] and they will assist you.      [employer] is an electronic gateway that provides easy, online access to your medical records. With [employer], you can request a clinic appointment, read your test results, renew a prescription or communicate with your care team.     To access your existing account, please contact your [facility] or call [phone] for assistance.        Care EveryWhere ID     This is your Care EveryWhere ID. This could be used by other organizations to access your [place] medical records  [id number]        Your Vitals Were     Pulse Temperature Height Last Period Pulse Oximetry BMI (Body Mass Index)    76 98  F (36.7  C) (Oral) 5' 6.3\" (168.4 cm) 02/28/2018 " 99% 39 kg/m2       Blood Pressure from Last 3 Encounters:   07/03/18 123/86   06/21/18 110/71   04/19/18 132/83    Weight from Last 3 Encounters:   07/03/18 243 lb 12.8 oz (110.6 kg)   06/21/18 243 lb 12.8 oz (110.6 kg)   03/23/18 233 lb (105.7 kg)              We Performed the Following     ABO/Rh Type-HML ([facility])     Antibody Screen ([facility])     Culture Urine ([facility])     Hemoglobin (HGB) ([facility])     Hepatitis B Surface Ag ([facility])     HIV Ag/Ab Screen [place] ([facility])     Rubella  IgG ([facility])     Syphilis Screen [place] ([facility])     Urinalysis([facility])     Wet Prep ([facility])        Primary Care Provider Office Phone # Fax #    Katie Emili Rangel [phone]       [facility] [address] [place] [address]        Equal Access to Services     [facility] : Hadii aad ku hadasho Soomaali, waaxda luqadaha, qaybta kaalmada adeegyada, waxay idiin hayaan mario alberto riddle . So [facility] [place] [phone].    ATENCIÓN: Si constantinola español, tiene a gardner disposición servicios gratuitos de asistencia lingüística. Llame al [phone].    We comply with applicable federal civil rights laws and [place] laws. We do not discriminate on the basis of race, color, national origin, age, disability, sex, sexual orientation, or gender identity.            Thank you!     Thank you for choosing [facility]  for your care. Our goal is always to provide you with excellent care. Hearing back from our patients is one way we can continue to improve our services. Please take a few minutes to complete the written survey that you may receive in the mail after your visit with us. Thank you!             Your Updated Medication List - Protect others around you: Learn how to safely use, store and throw away your medicines at www.disposemymeds.org.          This list is accurate as of 7/3/18  5:25 PM.  Always use your most recent med list.                   Brand Name Dispense  Instructions for use Diagnosis    prenatal multivitamin plus iron 27-0.8 MG Tabs per tablet     100 tablet    Take 1 tablet by mouth daily    Encounter for supervision of other normal pregnancy in first trimester       terbinafine 1 % cream    lamISIL AT    42 g    Apply topically 2 times daily    Tinea corporis

## 2018-07-03 NOTE — PROGRESS NOTES
First Obstetric Visit       HPI       Ning Giraldo is a 32 year old woman  who presents for an initial prenatal visit at Unknown pregnant with SRAVANTHI of 18 by ultrasound on 18 showed she was 6w0d at that time and thus she is 9w0d today.   She has had bleeding since her LMP.  Spotting. She has had mild nausea one episode of vomiting. Weight loss none.  This was not a planned pregnancy. She is unsure if she wishes to keep the pregnancy at this time.     OTHER CONCERNS: none     Labor Risk Assessment   Is the patient's age <18 or >40?    No  Patint's BMI is Body mass index is 39 kg/(m^2). Does patient have a BMI < 18.5?    No  If previous pregnancy, was delivery within previous 6 months?    No  Have you ever delivered a baby prior to 37 weeks gestation?    No  Did conception for this pregnancy occur via In Vitro Fertilization?    No  Are you carrying twins?    No    Summary:  Patient is Average/high risk for  Labor (verify Problem List includes V23.9 and note risk factor(s) in the Comments)  The patient has the following risk factors for  labor:  None    Average risk pregnancy  Past Medical History   Past Medical History:   Diagnosis Date     History of colposcopy with cervical biopsy age 14     History of snoring 2015     Tobacco dependence 2015     Do you have a history of any of the following medical conditions?    Condition Yes/No   Hypertension No   Heart disease, mitral valve prolapse, rheumatic fever No   Asthma or another chronic lung disease No   An autoimmune disorder No   Kidney disease No   Frequent urinary tract infections No   Migraine headaches No   Stroke, loss of sensation/function, seizures, or other neuro problem No   Diabetes No   Thyroid problems or have you taken thyroid medication No   Hepatitis, liver disease, jaundice No   Blood clots, phlebitis, pulmonary embolism or varicose veins No   Excessive bleeding after surgery or dental work No   Heavy  menstrual periods requiring treatment No   Anemia No   Blood transfusions No        Would you refuse a blood transfusion? No   Breast problems No   Abnormalities of the uterus No   Abnormal pap smear No   Have you been treated for an emotional disturbance? No   Have you been physically, sexually, or emotionally hurt by someone? No   Have you been in a major accident or suffered serious trauma? No   Have you had surgical procedures? No        Have you ever had any complications from anesthesia? No   Have you ever been hospitalized for a nonsurgical reason? No     Notes for positives  None    Prenatal Genetic Screening    Do you have a history of any of the following Yes/No        A metabolic disorder (e.g. Insulin-dependent DM, PKU) No        Recurrent pregnancy loss No     Do you, the baby's father, or anyone in your families have Yes/No        Thalassemia AND MCV <80 No        Hemophilia No        Neural tube defect No        Congenital heart defect No        Sickle cell disease or trait No        Muscular dystrophy No        Cystic fibrosis No        Mental retardation or autism No        Down's syndrome No        Tristan-Sach's disease No        Polk's chorea No        Any other inherited genetic or chromosomal disorder No        A child with birth defects not listed above No     Infection History    At high risk for coming in contact with HIV No   Ever treated for tuberculosis No   Ever received the BCG vaccine for tuberculosis No   Ever had genital herpes (or has your partner) No   Had a rash or viral illness since LMP No   Ever had a sexually transmitted infection No   Ever had chicken pox or the vaccine Yes   Ever had any other serious infectious disease No   Up to date with immunizations Yes       Habits  Smoker 3-4 cigarettes/day.  Current medications are:  Current Outpatient Prescriptions   Medication Sig Dispense Refill     Prenatal Vit-Fe Fumarate-FA (PRENATAL MULTIVITAMIN PLUS IRON) 27-0.8 MG TABS per  "tablet Take 1 tablet by mouth daily 100 tablet 3     terbinafine (LAMISIL AT) 1 % cream Apply topically 2 times daily (Patient not taking: Reported on 7/3/2018) 42 g 1       REVIEW OF SYSTEMS  ENT: NEGATIVE for ear, mouth and throat problems  CV: NEGATIVE for chest pain, palpitations or peripheral edema  GI: NEGATIVE for nausea, abdominal pain, heartburn, or change in bowel habits  : NEGATIVE for unusual urinary or vaginal symptoms. Periods are regular.  C: NEGATIVE for fever, chills, change in weight  I: NEGATIVE for worrisome rashes, moles or lesions  E: NEGATIVE for vision changes or irritation  R: NEGATIVE for significant cough or SOB  B: NEGATIVE for masses, tenderness or discharge  M: NEGATIVE for significant arthralgias or myalgia  N: NEGATIVE for weakness, dizziness or paresthesias  P: NEGATIVE for changes in mood or affect  ====================================================    PHYSICAL EXAM:  /86  Pulse 76  Temp 98  F (36.7  C) (Oral)  Ht 5' 6.3\" (168.4 cm)  Wt 243 lb 12.8 oz (110.6 kg)  LMP 02/28/2018  SpO2 99%  BMI 39 kg/m2       GENERAL:  Pleasant pregnant female, alert, well groomed.  SKIN:  Warm and dry, without lesions or rashes  HEAD: Symmetrical features.  EYES:  PERRL, EOMI.  MOUTH:  Buccal mucosa pink, moist without lesions.    NECK:  Thyroid without enlargement and nodules.  Lymph nodes not palpable.  LUNGS:  Clear to auscultation.  BREAST:  Symmetrical.  No dominant, fixed or suspicious masses are noted.  No skin or nipple changes or axillary nodes.   HEART:  RRR without murmur.  ABDOMEN: Soft without masses, tenderness or organomegaly.  No CVA tenderness. No scars noted.  MUSCULOSKELETAL:  Full range of motion  EXTREMITIES:  No edema. No significant varicosities.   External genitalia: No atrophy or scarring, no lesions or erythema, normal anatomy  Vagina: normal rugae, some white discharge, foul odor, cervix is close on bimanual exam, no adnexal " tenderness  =========================================    ASSESSMENT/PLAN   at 9w0d today who presents for her initial prenatal visit    1. Average risk pregnancy: Will obtain normal prenatal labs today besides GC/Chlamydia as negative in  and no new sexual partners  2. Hx of herpes outbreaks in the past: No active lesions on exam today. Will need suppressive therapy once at 36 weeks.   3. Hx of STI: GC and chlamydia in past. Recent testing of GC/chlamydia on 18 negative.   4. Vaginal discharge: wet prep.    FU in 4 weeks    Recommended weight gain: 15-25 lbs.  Instructed on best evidence for: weight gain for her BMI for pregnancy; healthy diet and foods to avoid; exercise and activity during pregnancy;  avoiding exposure to toxoplasmosis; and maintenance of a generally healthy lifestyle.   Discussed the harms, benefits, side effects and alternative therapies for current prescribed and OTC medications.    Options for treatment and follow-up care were reviewed with the patient and/or guardian. Ning Giraldo and/or guardian engaged in the decision making process and verbalized understanding of the options discussed and agreed with the final plan.    Kyle Gee MD

## 2018-07-04 LAB
BLD GP AB SCN SERPL QL: NEGATIVE
CULTURE: NORMAL
HBV SURFACE AG SERPL QL IA: NEGATIVE
RUBV IGG SERPL QL IA: POSITIVE
TREPONEMA ANTIBODY (SYPHILIS): NEGATIVE

## 2018-07-04 NOTE — PROGRESS NOTES
I have called the patient and informed her of the results for positive candidiasis and bacterial vaginosis on wet prep. I also informed her that I have sent two prescriptions, flagyl and clotrimazole to her pharmacy for her to  and start right away. Awaiting for the rest the results.    Dr. Kyle Gee

## 2018-07-05 ENCOUNTER — TELEPHONE (OUTPATIENT)
Dept: FAMILY MEDICINE | Facility: CLINIC | Age: 33
End: 2018-07-05

## 2018-07-05 LAB
ABO + RH BLD: NORMAL
REPEAT ABO/RH TYPING (HML): NORMAL

## 2018-07-05 NOTE — TELEPHONE ENCOUNTER
[facility] phone call message- patient requesting results:    Test: Lab    Date of test: 07/03/2018    Additional Comments: Calling for her test results, she states she had a positive results and no one called her about it. She is calling to see what she is positive for? Please call and advise.     OK to leave a message on voice mail? Yes    Primary language: English      needed? No    Call taken on July 5, 2018 at 12:41 PM by Jackson Lamb

## 2018-07-05 NOTE — TELEPHONE ENCOUNTER
Called patient regarding lab results. Patient did not answer, left VM to call clinic back. Will attempt phone call again at end of day since patient works during daytime. Cassandra MALIK

## 2018-07-05 NOTE — PROGRESS NOTES
Peggy,    Please call the patient and give them my message below,    Ning, the remainder of your results from your recent OB visit show that your HIV is negative, your syphilis is negative, you do not have Hepatitis B, you do not have a urinary tract infection, and your hemoglobin is normal. However, if you are concerned that you may have become infected with HIV (transmitted sexually or through blood) in the last 3 months you should be tested again for HIV again 3 months from now as sometimes we cannot detect the virus when it is an early infection. If you have any questions about these tests I will answer them at your next OB visit. Otherwise, continue taking the antibiotic and antifungal cream I prescribed you for you vaginal candidiasis infection and bacterial vaginosis.    Dr. Kyle Gee

## 2018-07-05 NOTE — TELEPHONE ENCOUNTER
Patient returned call. She was concerned when she saw the rubella IgG test and saw that it was positive. I explained to patient that this means she has immunity against rubella, not that she has rubella. Patient verbalized understanding. Cassandra MALIK

## 2018-07-06 PROBLEM — Z34.01 ENCOUNTER FOR SUPERVISION OF NORMAL FIRST PREGNANCY IN FIRST TRIMESTER: Status: ACTIVE | Noted: 2018-07-06

## 2018-07-17 NOTE — PROGRESS NOTES
Preceptor Attestation:   Patient seen, evaluated and discussed with the resident Dr Gee. I have verified the content of the note, which accurately reflects my assessment of the patient and the plan of care.  Supervising Physician:Fernando Brice MD  [facility]

## 2018-07-18 ENCOUNTER — RECORDS - HEALTHEAST (OUTPATIENT)
Dept: ADMINISTRATIVE | Facility: OTHER | Age: 33
End: 2018-07-18

## 2018-07-23 ENCOUNTER — TELEPHONE (OUTPATIENT)
Dept: FAMILY MEDICINE | Facility: CLINIC | Age: 33
End: 2018-07-23

## 2018-07-23 NOTE — TELEPHONE ENCOUNTER
ED follow up    Called pt and she is still feeling kind of cruddy, she has cramping but not horrible, and is resuming normal activity.  Checked to see if she wanted to get in earlier and she said yes and got her on schedule for Wednesday at 4:20pm [place]

## 2018-07-25 ENCOUNTER — OFFICE VISIT (OUTPATIENT)
Dept: FAMILY MEDICINE | Facility: CLINIC | Age: 33
End: 2018-07-25
Payer: COMMERCIAL

## 2018-07-25 VITALS
BODY MASS INDEX: 38.76 KG/M2 | WEIGHT: 241.2 LBS | SYSTOLIC BLOOD PRESSURE: 128 MMHG | DIASTOLIC BLOOD PRESSURE: 83 MMHG | OXYGEN SATURATION: 99 % | HEIGHT: 66 IN | HEART RATE: 67 BPM | RESPIRATION RATE: 18 BRPM | TEMPERATURE: 98.4 F

## 2018-07-25 DIAGNOSIS — Z30.49 ENCOUNTER FOR SURVEILLANCE OF OTHER CONTRACEPTIVE: ICD-10-CM

## 2018-07-25 DIAGNOSIS — O02.0 BLIGHTED OVUM: ICD-10-CM

## 2018-07-25 PROBLEM — Z34.01 ENCOUNTER FOR SUPERVISION OF NORMAL FIRST PREGNANCY IN FIRST TRIMESTER: Status: RESOLVED | Noted: 2018-07-06 | Resolved: 2018-07-25

## 2018-07-25 RX ORDER — NORELGESTROMIN AND ETHINYL ESTRADIOL 35; 150 UG/MG; UG/MG
1 PATCH TRANSDERMAL
Qty: 9 PATCH | Refills: 3 | Status: SHIPPED | OUTPATIENT
Start: 2018-07-25 | End: 2018-12-19

## 2018-07-25 NOTE — PROGRESS NOTES
"Chief Complaint   Patient presents with     ER F/U     Plum this past Sunday - micarriage no other concerns.     Medication Reconciliation     Completed.      Assessment and Plan   1. Blighted ovum  Patient seems to be coping well and miscarriage is progressing as expected. Again discussed the warning signs such as fever, etc. Where she needs to be reevaluated. She is keeping her appt on 8/3 to f/u with us.    2. Encounter for surveillance of other contraceptive  Patient would like tubal later this year, but would like to restart her patches for the time being.  - norelgestromin-ethinyl estradiol (ORTHO EVRA) 150-35 MCG/24HR patch; Place 1 patch onto the skin every 7 days  Dispense: 9 patch; Refill: 3    Options for treatment and follow-up care were reviewed with the patient and/or guardian. Ning Giraldo and/or guardian engaged in the decision making process and verbalized understanding of the options discussed and agreed with the final plan.    Fernando White MD  [facility], PGY-2    Precepted with Precepted with: Romaine Land MD         HPI:   Ning Giraldo is a 32 year old female who presents to clinic today for ED follow up.    Patient was seen 7/22 in the ED where it was determined through U/S and labs that she was having a miscarriage - specifically a blighted ovum.    Since being seen in the ED, the patient has continued to have vaginal bleeding. She is no longer having any abdominal pain. She has seen \"things\" coming out with the vaginal blood.    She reports that she has been under a lot of stress and has been smoking and not eating right during this pregnancy. Her relationship of 4 years recently came to an end.         Review of Systems:   A comprehensive 12 point review of systems was negative unless otherwise noted in the HPI.          PMHX:   Active Problems List  Patient Active Problem List   Diagnosis     Tobacco " "dependence     History of snoring     Atypical glandular cells on cervical Pap smear     Morbid obesity (H)     Pain in joint involving ankle and foot, right     Acquired pes planus of both feet     Blighted ovum     Active problem list reviewed and updated.    Current Medications  Current Outpatient Prescriptions   Medication Sig Dispense Refill     norelgestromin-ethinyl estradiol (ORTHO EVRA) 150-35 MCG/24HR patch Place 1 patch onto the skin every 7 days 9 patch 3     Prenatal Vit-Fe Fumarate-FA (PRENATAL MULTIVITAMIN PLUS IRON) 27-0.8 MG TABS per tablet Take 1 tablet by mouth daily 100 tablet 3     Medication list reviewed and updated.    Social History  Social History   Substance Use Topics     Smoking status: Current Every Day Smoker     Types: Cigarettes     Smokeless tobacco: Never Used      Comment: 1/2 pack/day     Alcohol use No     History   Drug Use No     Allergies  No Known Allergies  Allergies and Medication Intolerances Updated         Physical Exam:     Vitals:    07/25/18 1622   BP: 128/83   Pulse: 67   Resp: 18   Temp: 98.4  F (36.9  C)   TempSrc: Oral   SpO2: 99%   Weight: 241 lb 3.2 oz (109.4 kg)   Height: 5' 6.25\" (168.3 cm)     Body mass index is 38.64 kg/(m^2).    GENERAL APPEARANCE: alert, no acute distress   HEENT: Eyes grossly normal to inspection, nares normal  NECK: no adenopathy, asymmetry, masses, or scars  RESP: lungs clear to auscultation - no rales, rhonchi, or wheezes   CV: regular rate and rhythm, no murmur, click, rub, or gallop   ABDOMEN: soft, nontender, no hepatosplenomegaly or masses   MSK: extremities normal - no gross deformities noted   SKIN: no suspicious lesions or rashes   NEURO: Normal strength and tone, sensory exam grossly normal, mentation appears intact and speech normal   PSYCH: mood and affect subdued        "

## 2018-07-25 NOTE — MR AVS SNAPSHOT
After Visit Summary   7/25/2018    Ning Giraldo    MRN: [id number]           Patient Information     Date Of Birth          1985        Visit Information        Provider Department      7/25/2018 4:20 PM Fernando White MD [facility]        Today's Diagnoses     Blighted ovum        Encounter for surveillance of other contraceptive          Care Instructions      Understanding Blighted Ovum    Blighted ovum is a type of early pregnancy loss. It s also called anembryonic pregnancy or empty sac. You have a placenta and a gestational sac, but the embryo doesn t grow. The placenta and sac still give off pregnancy hormones. These cause a pregnancy test to show positive, and can give you symptoms of pregnancy. Because of this, the pregnancy loss often isn t found until an ultrasound is done later in pregnancy.  How to say it  ADRIENNE-[facility]ess [facility]   What causes blighted ovum?  In most cases, the cause is abnormal chromosomes in the fertilized egg. Other possible causes include:    An infection    An autoimmune disease in the mother    An endocrine disease in the mother    Tissue that divides the inside of the uterus into sections (septum)  Symptoms of blighted ovum    Mild cramps in your lower belly    Light bleeding from the vagina called spotting  Treatment for blighted ovum  After a pregnancy loss, the placenta and sac need to leave the body. The process can cause pain and bleeding. This can happen on its own, or your body may need help. Your healthcare provider may advise any of the below:    Expectant management. This means to watch and wait. If it s safe for you, your healthcare provider may advise waiting several weeks to see if the placenta and sac are released from your uterus on their own.    Medicine. The medicine misoprostol can help the process. You may take the medicine by mouth. Or it may be put into your vagina. You may also be given pain medicine to help lessen abdominal  cramping.    Uterine evacuation/curettage. You are given pain medicine and medicine to help you relax and feel sleepy. A tube is put through the opening of the vagina and cervix. The tube is attached to a vacuum device. The device creates suction that helps remove the tissue. In some cases, a tool called a curette is used to help loosen tissue in the uterus first.  After or during the treatment, you may need to have an ultrasound test. This is to make sure all the tissue has left your body.  You will also need emotional support during and after this process. Losing a pregnancy is very upsetting. Talk with your healthcare provider and your family to get support during this time. You may want to speak with a counselor or attend a support group. Make sure to ask for any help you need.  Possible complications of blighted ovum  Possible problems that can occur from treatment of a blighted ovum include:    Excess bleeding    Infection    Scar tissue    Tear in the uterus (perforation)  When to call your healthcare provider  Call your healthcare provider right away if you have any of these:    A lot of bleeding from your vagina    Dizziness or fainting    Fever of 100.4 F (38 C) or higher, or as directed    Other symptoms that don t get better, or get worse    Severe pain that isn t helped with pain medicine   Date Last Reviewed: 6/1/2016 2000-2017 [employer]. [address] [place] [address]. All rights reserved. This information is not intended as a substitute for professional medical care. Always follow your healthcare professional's instructions.                Follow-ups after your visit        Your next 10 appointments already scheduled     Aug 03, 2018  3:40 PM CDT   RETURN OB with Kyle Gee MD   [facility] ([facility])    [address] [place] [id number]   [phone]              Who to contact     Please call your clinic at [phone] to:    Ask  "questions about your health    Make or cancel appointments    Discuss your medicines    Learn about your test results    Speak to your doctor            Additional Information About Your Visit        [employer] Information     [employer] gives you secure access to your electronic health record. If you see a primary care provider, you can also send messages to your care team and make appointments. If you have questions, please call your primary care clinic.  If you do not have a primary care provider, please call [phone] and they will assist you.      [employer] is an electronic gateway that provides easy, online access to your medical records. With [employer], you can request a clinic appointment, read your test results, renew a prescription or communicate with your care team.     To access your existing account, please contact your [facility] or call [phone] for assistance.        Care EveryWhere ID     This is your Care EveryWhere ID. This could be used by other organizations to access your [place] medical records  [id number]        Your Vitals Were     Pulse Temperature Respirations Height Last Period Pulse Oximetry    67 98.4  F (36.9  C) (Oral) 18 5' 6.25\" (168.3 cm) 02/28/2018 99%    BMI (Body Mass Index)                   38.64 kg/m2            Blood Pressure from Last 3 Encounters:   07/25/18 128/83   07/03/18 123/86   06/21/18 110/71    Weight from Last 3 Encounters:   07/25/18 241 lb 3.2 oz (109.4 kg)   07/03/18 243 lb 12.8 oz (110.6 kg)   06/21/18 243 lb 12.8 oz (110.6 kg)              Today, you had the following     No orders found for display         Today's Medication Changes          These changes are accurate as of 7/25/18  4:53 PM.  If you have any questions, ask your nurse or doctor.               Start taking these medicines.        Dose/Directions    norelgestromin-ethinyl estradiol 150-35 MCG/24HR patch   Commonly known as:  ORTHO EVRA   Used for:  Encounter for " surveillance of other contraceptive   Started by:  Fernando White MD        Dose:  1 patch   Place 1 patch onto the skin every 7 days   Quantity:  9 patch   Refills:  3            Where to get your medicines      These medications were sent to [id number] - [place], [place] - [address] AT [facility]  [address], [place] [address]     Phone:  [phone]     norelgestromin-ethinyl estradiol 150-35 MCG/24HR patch                Primary Care Provider Office Phone # Fax #    Katie Emili Rangel [phone]       [facility] [address] [place] [address]        Equal Access to Services     [facility]: Hadii aad ku hadasho Soomaali, waaxda luqadaha, qaybta kaalmada adeegyada, waxay matthewin yusef riddle . So [facility] [phone].    ATENCIÓN: Si habla español, tiene a gardner disposición servicios gratuitos de asistencia lingüística. [facility] [phone].    We comply with applicable federal civil rights laws and [place] laws. We do not discriminate on the basis of race, color, national origin, age, disability, sex, sexual orientation, or gender identity.            Thank you!     Thank you for choosing [facility]  for your care. Our goal is always to provide you with excellent care. Hearing back from our patients is one way we can continue to improve our services. Please take a few minutes to complete the written survey that you may receive in the mail after your visit with us. Thank you!             Your Updated Medication List - Protect others around you: Learn how to safely use, store and throw away your medicines at www.disposemymeds.org.          This list is accurate as of 7/25/18  4:53 PM.  Always use your most recent med list.                   Brand Name Dispense Instructions for use Diagnosis    norelgestromin-ethinyl estradiol 150-35 MCG/24HR patch    ORTHO EVRA    9 patch    Place 1 patch  onto the skin every 7 days    Encounter for surveillance of other contraceptive       prenatal multivitamin plus iron 27-0.8 MG Tabs per tablet     100 tablet    Take 1 tablet by mouth daily    Encounter for supervision of other normal pregnancy in first trimester

## 2018-07-25 NOTE — PATIENT INSTRUCTIONS
Understanding Blighted Ovum    Blighted ovum is a type of early pregnancy loss. It s also called anembryonic pregnancy or empty sac. You have a placenta and a gestational sac, but the embryo doesn t grow. The placenta and sac still give off pregnancy hormones. These cause a pregnancy test to show positive, and can give you symptoms of pregnancy. Because of this, the pregnancy loss often isn t found until an ultrasound is done later in pregnancy.  How to say it  ADRIENNE-bess OH-vuhm   What causes blighted ovum?  In most cases, the cause is abnormal chromosomes in the fertilized egg. Other possible causes include:    An infection    An autoimmune disease in the mother    An endocrine disease in the mother    Tissue that divides the inside of the uterus into sections (septum)  Symptoms of blighted ovum    Mild cramps in your lower belly    Light bleeding from the vagina called spotting  Treatment for blighted ovum  After a pregnancy loss, the placenta and sac need to leave the body. The process can cause pain and bleeding. This can happen on its own, or your body may need help. Your healthcare provider may advise any of the below:    Expectant management. This means to watch and wait. If it s safe for you, your healthcare provider may advise waiting several weeks to see if the placenta and sac are released from your uterus on their own.    Medicine. The medicine misoprostol can help the process. You may take the medicine by mouth. Or it may be put into your vagina. You may also be given pain medicine to help lessen abdominal cramping.    Uterine evacuation/curettage. You are given pain medicine and medicine to help you relax and feel sleepy. A tube is put through the opening of the vagina and cervix. The tube is attached to a vacuum device. The device creates suction that helps remove the tissue. In some cases, a tool called a curette is used to help loosen tissue in the uterus first.  After or during the treatment, you  may need to have an ultrasound test. This is to make sure all the tissue has left your body.  You will also need emotional support during and after this process. Losing a pregnancy is very upsetting. Talk with your healthcare provider and your family to get support during this time. You may want to speak with a counselor or attend a support group. Make sure to ask for any help you need.  Possible complications of blighted ovum  Possible problems that can occur from treatment of a blighted ovum include:    Excess bleeding    Infection    Scar tissue    Tear in the uterus (perforation)  When to call your healthcare provider  Call your healthcare provider right away if you have any of these:    A lot of bleeding from your vagina    Dizziness or fainting    Fever of 100.4 F (38 C) or higher, or as directed    Other symptoms that don t get better, or get worse    Severe pain that isn t helped with pain medicine   Date Last Reviewed: 6/1/2016 2000-2017 [employer]. [address] [place] [address], [place] [address]. All rights reserved. This information is not intended as a substitute for professional medical care. Always follow your healthcare professional's instructions.

## 2018-07-25 NOTE — PROGRESS NOTES
Preceptor Attestation:   Patient seen, evaluated and discussed with the resident. I have verified the content of the note, which accurately reflects my assessment of the patient and the plan of care.  Supervising Physician:Romaine Land MD  [facility]

## 2018-08-03 ENCOUNTER — OFFICE VISIT (OUTPATIENT)
Dept: FAMILY MEDICINE | Facility: CLINIC | Age: 33
End: 2018-08-03
Payer: COMMERCIAL

## 2018-08-03 VITALS
WEIGHT: 238.4 LBS | TEMPERATURE: 98 F | SYSTOLIC BLOOD PRESSURE: 133 MMHG | HEIGHT: 66 IN | OXYGEN SATURATION: 100 % | HEART RATE: 79 BPM | BODY MASS INDEX: 38.31 KG/M2 | DIASTOLIC BLOOD PRESSURE: 84 MMHG

## 2018-08-03 DIAGNOSIS — F43.21 ADJUSTMENT DISORDER WITH DEPRESSED MOOD: ICD-10-CM

## 2018-08-03 DIAGNOSIS — O03.9 MISCARRIAGE: ICD-10-CM

## 2018-08-03 DIAGNOSIS — O02.0 BLIGHTED OVUM: Primary | ICD-10-CM

## 2018-08-03 LAB
B-HCG SERPL-ACNC: 49 MLU/ML (ref 0–4)
HEMOGLOBIN: 12.2 G/DL (ref 11.7–15.7)

## 2018-08-03 NOTE — LETTER
August 6, 2018      Ning Giraldo  [address]   [place] [address]        Dear Ning,    Your results from your recent clinic visit show that your Beta-hcg level is decreasing and is now only 49.  We like to see this get even lower. You should come back to clinic and have your blood drawn in 1 week to see if this level continues to decrease. I will place a lab only order for this and you can schedule with the  for this. However, if you start having increased bleeding or fever, chills, or significant pelvic pain you should let us know and we should obtain an ultrasound.     Please see below for your test results.    Resulted Orders   Beta-HCG Quantitative ([facility])   Result Value Ref Range    Beta hCG, Quantitative 49 (H) 0 - 4 mlU/mL    Narrative    Test performed by:  [facility] [address] [place] [address]  Non-pregnant female . . . . . . . . . . . . . 0-4 (<5) mIU/mL  Equivocal result for early pregnancy . . . . 5-24 mIU/mL  Pregnant Female:  -------------------------------------------------------------  Weeks Post LMP Approximate HCG range(mIU/mL)  (Last Menstrual Period)range  -------------------------------------------------------------  3-4 Weeks . . . . . . . 9- 130 mIU/mL  4-5 Weeks . . . . . . . 75- 2,600 mIU/mL  5-6 Weeks . . . . . . 850- 20,800 mIU/mL  6-7 Weeks . . . . . 4,000-100,200 mIU/mL  7-12 Weeks . . . . . 11,500-289,000 mIU/mL  12-16 Weeks . . . . . 18,300-137,000 mIU/mL  16-29 Weeks. . . . . . 1,400- 53,000 mIU/mL  (2nd Trimester)  29-41 Weeks. . . . . . . 940- 60,000 mIU/mL  (3rd Trimester)  INTERPRETIVE NOTE:  For diagnostic purposes, hCG results should be used  conjunction with other data.  If the hCG level is inconsistent with clinical evidence,  results should be confirmed by an alternate hCG method.  This assay is approved for use in the early detection  of pregnancy only. It is not approved for any other  uses such as tumor marker  screening or monitoring.  Beta HCG ranges were updated 2/2007 to reflect  methodology referencing to the 4th World Health  Organization (WHO) International Standard.   Hemoglobin (HGB) ([facility])   Result Value Ref Range    Hemoglobin 12.2 11.7 - 15.7 g/dL       If you have any questions, please call the clinic to make an appointment.    Sincerely,    Kyle Gee MD

## 2018-08-03 NOTE — MR AVS SNAPSHOT
"              After Visit Summary   8/3/2018    Ning Giraldo    MRN: [id number]           Patient Information     Date Of Birth          1985        Visit Information        Provider Department      8/3/2018 3:40 PM Kyle Gee MD [facility]        Today's Diagnoses     Blighted ovum    -  1    Miscarriage        Adjustment disorder with depressed mood           Follow-ups after your visit        Who to contact     Please call your clinic at [phone] to:    Ask questions about your health    Make or cancel appointments    Discuss your medicines    Learn about your test results    Speak to your doctor            Additional Information About Your Visit        MyChart Information     [employer] gives you secure access to your electronic health record. If you see a primary care provider, you can also send messages to your care team and make appointments. If you have questions, please call your primary care clinic.  If you do not have a primary care provider, please call [phone] and they will assist you.      [employer] is an electronic gateway that provides easy, online access to your medical records. With [employer], you can request a clinic appointment, read your test results, renew a prescription or communicate with your care team.     To access your existing account, please contact your [facility] or call [phone] for assistance.        Care EveryWhere ID     This is your Care EveryWhere ID. This could be used by other organizations to access your [facility] medical records  [id number]        Your Vitals Were     Pulse Temperature Height Last Period Pulse Oximetry BMI (Body Mass Index)    79 98  F (36.7  C) (Oral) 5' 6.2\" (168.1 cm) 02/28/2018 100% 38.25 kg/m2       Blood Pressure from Last 3 Encounters:   08/03/18 133/84   07/25/18 128/83   07/03/18 123/86    Weight from Last 3 Encounters:   08/03/18 238 lb 6.4 oz (108.1 kg)   07/25/18 241 lb 3.2 oz (109.4 " kg)   07/03/18 243 lb 12.8 oz (110.6 kg)              We Performed the Following     Beta-HCG Quantitative ([facility])     Hemoglobin (HGB) ([facility])        Primary Care Provider Office Phone # Fax #    Katie Emili Rangel [phone]       [facility] [address] [place] [address]        Equal Access to Services     PÉREZ HOLBROOK : Hadii aad ku hadasho Soomaali, waaxda luqadaha, qaybta kaalmada adeegyada, waxay idiin hayaan adeandrey kharash la'aan ah. So [facility] [phone].    ATENCIÓN: Si habla español, tiene a gardner disposición servicios gratuitos de asistencia lingüística. Oliveame al [phone].    We comply with applicable federal civil rights laws and [place] laws. We do not discriminate on the basis of race, color, national origin, age, disability, sex, sexual orientation, or gender identity.            Thank you!     Thank you for choosing [facility]  for your care. Our goal is always to provide you with excellent care. Hearing back from our patients is one way we can continue to improve our services. Please take a few minutes to complete the written survey that you may receive in the mail after your visit with us. Thank you!             Your Updated Medication List - Protect others around you: Learn how to safely use, store and throw away your medicines at www.disposemymeds.org.          This list is accurate as of 8/3/18  4:27 PM.  Always use your most recent med list.                   Brand Name Dispense Instructions for use Diagnosis    norelgestromin-ethinyl estradiol 150-35 MCG/24HR patch    ORTHO EVRA    9 patch    Place 1 patch onto the skin every 7 days    Encounter for surveillance of other contraceptive       prenatal multivitamin plus iron 27-0.8 MG Tabs per tablet     100 tablet    Take 1 tablet by mouth daily    Encounter for supervision of other normal pregnancy in first trimester

## 2018-08-03 NOTE — PROGRESS NOTES
Assessment and Plan     (O02.0) Blighted ovum  (primary encounter diagnosis)  Comment: Rechecked Beta HCG today. Down to 49 from approximately 2600 on 7/22. No longer having vaginal bleeding. Will follow this until less than 2. Placed future lab order for B-HCG for patient to come and have drawn next week. Called and informed patient of this.   Plan: [facility]), Hemoglobin         (HGB) ([facility]), Beta-HCG Quantitative         [facility])    (F43.21) Adjustment disorder with depressed mood  Comment: Having some mood symptoms but within normal range given trauma of miscarriage.  Plan: Close Follow-up to observe for resolution of mood symptoms with time    Follow-up in 1 week for lab draw    Options for treatment and follow-up care were reviewed with the patient and/or guardian. Ning Giraldo and/or guardian engaged in the decision making process and verbalized understanding of the options discussed and agreed with the final plan.    Kyle Gee MD    Precepted today with: Niharika Blake MD         HPI:       Ning Giraldo is a 32 year old  female w/ who presents fo Follow-up of    Blighted Ovum:  - Was seen for initial OB visit at [facility] 7/3/18.   - Presented to ED on 7/22/18 with vaginal bleeding, found to have anembryonic pregnancy via US  - Beta HCG of 2632 at that time  - No longer having vaginal bleeding  - no vaginal pain  - no fevers, chills    Fatigue:  - insomnia: 7 hrs per night, 9 is normal for her  - Good appetite  - Feels hopeless  - No suicidal thoughts  - Some anhedonia  - Breakup 3 months ago         PMHX:     Patient Active Problem List   Diagnosis     Tobacco dependence     History of snoring     Atypical glandular cells on cervical Pap smear     Morbid obesity (H)     Pain in joint involving ankle and foot, right     Acquired pes planus of both feet     Blighted ovum       Current Outpatient Prescriptions   Medication Sig Dispense Refill      "norelgestromin-ethinyl estradiol (ORTHO EVRA) 150-35 MCG/24HR patch Place 1 patch onto the skin every 7 days 9 patch 3     Prenatal Vit-Fe Fumarate-FA (PRENATAL MULTIVITAMIN PLUS IRON) 27-0.8 MG TABS per tablet Take 1 tablet by mouth daily 100 tablet 3       Social History     Social History     Marital status: Single     Spouse name: N/A     Number of children: N/A     Years of education: N/A     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         Review of Systems:   Complete review of systems is negative except as noted in the HPI          Physical Exam:     Vitals:    08/03/18 1555   BP: 133/84   Pulse: 79   Temp: 98  F (36.7  C)   TempSrc: Oral   SpO2: 100%   Weight: 238 lb 6.4 oz (108.1 kg)   Height: 5' 6.2\" (168.1 cm)     Body mass index is 38.25 kg/(m^2).    GENERAL APPEARANCE: healthy, alert and no distress,  EYES: Eyes grossly normal to inspection,  PERRL, no pale mucous membranes  RESP: lungs clear to auscultation - no rales, rhonchi or wheezes  CV: regular rate and rhythm,  and no murmur, click,  rub or gallop  ABDOMEN: soft, nontender, without hepatosplenomegaly or masses    "

## 2018-08-06 ENCOUNTER — TELEPHONE (OUTPATIENT)
Dept: FAMILY MEDICINE | Facility: CLINIC | Age: 33
End: 2018-08-06

## 2018-08-06 NOTE — PROGRESS NOTES
Pt called back and inform her of results and recommendation. Lab visit schedule on 8/7/2018. Pt states understanding and have no further questions for MD at this time.

## 2018-08-06 NOTE — PROGRESS NOTES
Marya or Teresa,  Please call the patient and give them my message below,  Ning,  Your results from your recent clinic visit show that your Beta-hcg level is decreasing and is now only 49.  We like to see this get even lower. You should come back to clinic and have your blood drawn in 1 week to see if this level continues to decrease. I will place a lob only order for this and you can schedule with the  for this. However, if you start having increased bleeding or fever, chills, or significant pelvic pain you should let us know and we should obtain an ultrasound.    If you have more questions please let my PCS and tell her a number I can reach you at and I will call you back.    Dr. Kyle Gee

## 2018-08-06 NOTE — TELEPHONE ENCOUNTER
Paged  to review results of beta-quant due to no provider available for next few days and time sensitivity of results. Awaiting call-back with further instruction. Cassandra MALIK

## 2018-08-23 DIAGNOSIS — O03.9 MISCARRIAGE: ICD-10-CM

## 2018-08-23 DIAGNOSIS — O02.0 BLIGHTED OVUM: ICD-10-CM

## 2018-08-23 LAB — B-HCG SERPL-ACNC: 5 MLU/ML (ref 0–4)

## 2018-08-23 NOTE — LETTER
August 27, 2018      Ning Giraldo  [address]   [place] [address]        Dear Ning,    Your results from your recent lab draw show that your B-HCG level is still 5. We should follow this until its below 2.  You should come and have your blood drawn again in 1-2 weeks. I have placed a lab order for this time so you do not need to make an appointment.     Please see below for your test results.    Resulted Orders   Beta-HCG Quantitative ([facility])   Result Value Ref Range    Beta hCG, Quantitative 5 (H) 0 - 4 mlU/mL    Narrative    Test performed by:  [facility] [address] [place] [address]  Non-pregnant female . . . . . . . . . . . . . 0-4 (<5) mIU/mL  Equivocal result for early pregnancy . . . . 5-24 mIU/mL  Pregnant Female:  -------------------------------------------------------------  Weeks Post LMP Approximate HCG range(mIU/mL)  (Last Menstrual Period)range  -------------------------------------------------------------  3-4 Weeks . . . . . . . 9- 130 mIU/mL  4-5 Weeks . . . . . . . 75- 2,600 mIU/mL  5-6 Weeks . . . . . . 850- 20,800 mIU/mL  6-7 Weeks . . . . . 4,000-100,200 mIU/mL  7-12 Weeks . . . . . 11,500-289,000 mIU/mL  12-16 Weeks . . . . . 18,300-137,000 mIU/mL  16-29 Weeks. . . . . . 1,400- 53,000 mIU/mL  (2nd Trimester)  29-41 Weeks. . . . . . . 940- 60,000 mIU/mL  (3rd Trimester)  INTERPRETIVE NOTE:  For diagnostic purposes, hCG results should be used  conjunction with other data.  If the hCG level is inconsistent with clinical evidence,  results should be confirmed by an alternate hCG method.  This assay is approved for use in the early detection  of pregnancy only. It is not approved for any other  uses such as tumor marker screening or monitoring.  Beta HCG ranges were updated 2/2007 to reflect  methodology referencing to the 4th World Health  Organization (WHO) International Standard.       If you have any questions, please call the clinic to make an  appointment.    Sincerely,    Dr. Kyle Gee

## 2018-08-24 DIAGNOSIS — O02.0 BLIGHTED OVUM: Primary | ICD-10-CM

## 2018-08-24 NOTE — PROGRESS NOTES
Marya or Teresa,  Please call the patient and give them my message below,  Ning  Your results from your recent lab draw show that your B-HCG level is still 5. We should follow this until its below 2.  You should come and have your blood drawn again in 1-2 weeks. I have placed a lab order for this time so you do not need to make an appointment.     If you have more questions please let my PCS and tell her a number I can reach you at and I will call you back.    Dr. Kyle Gee

## 2018-09-07 NOTE — PROGRESS NOTES
Preceptor Attestation:   Patient seen, evaluated and discussed with the resident, Dr. Kyle Hartman.  I have verified the content of the note, which accurately reflects my assessment of the patient and the plan of care.  Supervising Physician:Niharika Blake MD  [facility]

## 2018-09-10 DIAGNOSIS — O02.0 BLIGHTED OVUM: ICD-10-CM

## 2018-09-10 LAB — B-HCG SERPL-ACNC: <2 MLU/ML (ref 0–4)

## 2018-09-10 NOTE — LETTER
September 11, 2018      Ning Giraldo  [address]   [place] [address]        Dear Ning,    Your results from your lab visit show that you Beta-HCG level is now below 2.  Thus we no longer need to follow this. You should continue to use your birth control and if you have not been seen for a physical this year should schedule this in the next two months sometime.     Please see below for your test results.    Resulted Orders   Beta-HCG Quantitative ([facility])   Result Value Ref Range    Beta hCG, Quantitative <2 0 - 4 mlU/mL    Narrative    Test performed by:  [facility]  [address] [place] [address]  Non-pregnant female . . . . . . . . . . . . . 0-4 (<5) mIU/mL  Equivocal result for early pregnancy . . . . 5-24 mIU/mL  Pregnant Female:  -------------------------------------------------------------  Weeks Post LMP Approximate HCG range(mIU/mL)  (Last Menstrual Period)range  -------------------------------------------------------------  3-4 Weeks . . . . . . . 9- 130 mIU/mL  4-5 Weeks . . . . . . . 75- 2,600 mIU/mL  5-6 Weeks . . . . . . 850- 20,800 mIU/mL  6-7 Weeks . . . . . 4,000-100,200 mIU/mL  7-12 Weeks . . . . . 11,500-289,000 mIU/mL  12-16 Weeks . . . . . 18,300-137,000 mIU/mL  16-29 Weeks. . . . . . 1,400- 53,000 mIU/mL  (2nd Trimester)  29-41 Weeks. . . . . . . 940- 60,000 mIU/mL  (3rd Trimester)  INTERPRETIVE NOTE:  For diagnostic purposes, hCG results should be used  conjunction with other data.  If the hCG level is inconsistent with clinical evidence,  results should be confirmed by an alternate hCG method.  This assay is approved for use in the early detection  of pregnancy only. It is not approved for any other  uses such as tumor marker screening or monitoring.  Beta HCG ranges were updated 2/2007 to reflect  methodology referencing to the 4th World Health  Organization (WHO) International Standard.       If you have any questions, please call the clinic to  make an appointment.    Sincerely,    Dr. Kyle Gee

## 2018-09-11 NOTE — PROGRESS NOTES
Marya or Teresa,  Please call the patient and give them my message below,  Ning,  Your results from your lab visit show that you Beta-HCG level is now below 2.  Thus we no longer need to follow this. You should continue to use your birth control and if you have not been seen for a physical this year should schedule this in the next two months sometime.     If you have more questions please let my PCS know and tell her a number I can reach you at and I will call you back.    Dr. Kyle Gee

## 2018-12-18 NOTE — PROGRESS NOTES
"       Subjective:   Ning Giraldo is a 32 year old year old female who presents to clinic today for the following health issues:    Amenorrhea:  History of blighted ovum 2018, followed b-HCGs <2  Normal periods for the past 2 months   Was supposed to have her menstrual period on 2018 but did not get it.  Last had intercourse a week ago, has intermittently been using protection  Hasnt been on the birth control patch for 2 months   multiple pregnancy tests at home were negative  +Bloating, nausea, light bleeding, breast tenderness  Some minor left lower quadrant abdominal pain that has since resolved  Smoker  No dysuria   No fevers  Does not desire more children, not interested in LARC         PMHX:   PAST MEDICAL HISTORY:  Patient Active Problem List   Diagnosis     Tobacco dependence     History of snoring     Atypical glandular cells on cervical Pap smear     Morbid obesity (H)     Pain in joint involving ankle and foot, right     Acquired pes planus of both feet     Blighted ovum     CURRENT MEDICATIONS:  No current outpatient medications on file.     ALLERGIES:   No Known Allergies       Objective:     Vitals:    18 1604 18 1609   BP: 138/87 136/90   Pulse: 81    Resp: 16    Temp: 98.6  F (37  C)    TempSrc: Oral    SpO2: 99%    Weight: 103.9 kg (229 lb)    Height: 1.683 m (5' 6.25\")      Body mass index is 36.68 kg/m .  Results for orders placed or performed in visit on 18 (from the past 24 hour(s))   HCG Qualitative Urine (UPT)  ([facility] )   Result Value Ref Range    HCG Qual Urine NEGATIVE Negative    Narrative    S.020 LK, CMA       General: Alert, well-appearing female in NAD  Pulm: CTA BL, no tachypnea  CV: RRR, no murmur  Abd: soft, NTND, no masses  Psych: Mood appropriate to visit content, full affect, rational thought content and process    Assessment and Plan:   1. Amenorrhea  Menstrual period 2 weeks late with multiple negative pregnancy tests.  History of recent unprotected " intercourse.  Follow-up in 2 weeks for repeat UPT.  - HCG Qualitative Urine (UPT)  ([facility])    Options for treatment and follow-up care were reviewed with the patient and/or guardian. Nign Giraldo and/or guardian engaged in the decision making process and verbalized understanding of the options discussed and agreed with the final plan.    Elenita Horner DO  [facility] [place] [facility] Resident    Precepted with: Romaine Land MD

## 2018-12-19 ENCOUNTER — OFFICE VISIT (OUTPATIENT)
Dept: FAMILY MEDICINE | Facility: CLINIC | Age: 33
End: 2018-12-19
Payer: COMMERCIAL

## 2018-12-19 VITALS
DIASTOLIC BLOOD PRESSURE: 90 MMHG | RESPIRATION RATE: 16 BRPM | TEMPERATURE: 98.6 F | BODY MASS INDEX: 36.8 KG/M2 | WEIGHT: 229 LBS | HEART RATE: 81 BPM | SYSTOLIC BLOOD PRESSURE: 136 MMHG | OXYGEN SATURATION: 99 % | HEIGHT: 66 IN

## 2018-12-19 DIAGNOSIS — N91.2 AMENORRHEA: Primary | ICD-10-CM

## 2018-12-19 LAB — HCG UR QL: NEGATIVE

## 2018-12-19 ASSESSMENT — MIFFLIN-ST. JEOR: SCORE: 1759.46

## 2018-12-31 ENCOUNTER — OFFICE VISIT (OUTPATIENT)
Dept: FAMILY MEDICINE | Facility: CLINIC | Age: 33
End: 2018-12-31
Payer: COMMERCIAL

## 2018-12-31 VITALS
SYSTOLIC BLOOD PRESSURE: 110 MMHG | TEMPERATURE: 97.5 F | WEIGHT: 224.6 LBS | BODY MASS INDEX: 37.42 KG/M2 | HEART RATE: 97 BPM | RESPIRATION RATE: 18 BRPM | OXYGEN SATURATION: 99 % | DIASTOLIC BLOOD PRESSURE: 77 MMHG | HEIGHT: 65 IN

## 2018-12-31 DIAGNOSIS — N92.6 ABNORMAL BLEEDING IN MENSTRUAL CYCLE: Primary | ICD-10-CM

## 2018-12-31 DIAGNOSIS — Z23 NEED FOR INFLUENZA VACCINATION: ICD-10-CM

## 2018-12-31 LAB — HCG UR QL: NEGATIVE

## 2018-12-31 ASSESSMENT — MIFFLIN-ST. JEOR: SCORE: 1720.27

## 2018-12-31 NOTE — PROGRESS NOTES
Assessment and Plan       Ning Giraldo is a 33 year old female with past medical hx including obesity, smoking who presented to clinic today for follow up of abnormal menstrual bleeding    Abnormal bleeding in menstrual cycle  -Follow up after delayed menstrual cycle at which time she was dvised to return for repeat UPT due to recent intercourse relative to that previous visit.  -Patient has now had cycle, suspect may be result of previous anovulatory cycle vs normal variation vs less likely early premenopausal changes.   -Repeat UPT today negative. Pregnancy unlikely  -Hemodynamically stable, no exam findings to suggest significant anemia  -Discussed ongoing monitoring and birth control. She is most interested in tubal ligation, but not ready to undergo surgery. Would not like to use implant or IUD. With age, weight, and smoking hx is not a good candidate or combined therapies. Discussed to call if she would like referral for OB/G and plans to follow up for annual wellness visit.   - HCG Qualitative Urine (UPT)  ([facility])      Options for treatment and follow-up care were reviewed with the patient. Ning Giraldo engaged in the decision making process and verbalized understanding of the options discussed and agreed with the final plan.    Benjamin Rosenstein, MD, MA  [facility]  P: [phone]      Precepted today with: Alexis Adkins MD         HPI:       Chief Complaint   Patient presents with     Abnormal Bleeding Problem     follow-up menstra cycle., had abnormal cramping, and did start bleeding heavier after last visit.     Headache     started 2 weeks ago, on and off       Ning Giraldo is a 33 year old  female who presents for follow up of concern(s) listed below    Following up - Amenorrhea   -Was seen a couple weeks ago due to period coming 2 weeks late  -Menstrual cycle has always been regular before  -Over past week, has had spotting that led to heavier period similar to normal periods  -Did  "note a few clots  -Says felt kind of similar to miscarriage in July  -Having some dizziness, lightheadedness at times  -Has also had some nausea, breast heaviness  -Period stopped Saturday, also had intercourse Saturday and used condom  -Currently smokes 1 pack per week         Review of Systems:     5 point ROS negative except as noted above in HPI, including Gen., Resp, CV, GI &  system review.             PFSH:   Problem List   Patient Active Problem List   Diagnosis     Tobacco dependence     History of snoring     Atypical glandular cells on cervical Pap smear     Morbid obesity (H)     Pain in joint involving ankle and foot, right     Acquired pes planus of both feet     Blighted ovum        Medications   No current outpatient medications on file.        Social History    History   Smoking Status     Current Every Day Smoker     Types: Cigarettes   Smokeless Tobacco     Never Used     Comment: 1/2 pack/day           No Known Allergies    Physical Exam        Physical Exam:     Vitals:    12/31/18 1332   BP: 110/77   Pulse: 97   Resp: 18   Temp: 97.5  F (36.4  C)   TempSrc: Oral   SpO2: 99%   Weight: 101.9 kg (224 lb 9.6 oz)   Height: 1.66 m (5' 5.35\")     Body mass index is 36.97 kg/m .    General: Awake, seated comfortably, appears well and NAD   HEENT:  - Head: Atraumatic, normocephalic  - Eyes: Corneas clear, sclera without injection, no discharge, EOMI, PERRLA  - Throat: Oropharynx clear without lesions, no oral pallor  CV: RRR, normal S1/S2, no murmurs/rubs/gallops, Radial and DP pulses 2/4   Pulm: Normal WOB, lungs CTAB, no wheezes or crackles, good air movement   GI: Abdomen obese, soft, not tender, not distended, BS normal and active throughout       Results for orders placed or performed in visit on 12/31/18 (from the past 24 hour(s))   HCG Qualitative Urine (UPT)  (P )   Result Value Ref Range    HCG Qual Urine NEGATIVE Negative       There are no discontinued medications.    Patient " Instructions   Patient Instructions   Thank you for coming in today    -Your pregnancy test was negative  -Let us know if you are interested in seeing the OB/G physicians. We can set this up for you.   -We'll see you for your annual physical          This note was completed with the assistance of dictation software. Typos and word substitution-errors are expected and unintended.

## 2018-12-31 NOTE — NURSING NOTE
Injectable influenza vaccine documentation    1. Has the patient received the information for the influenza vaccine? YES    2. Does the patient have a severe allergy to eggs (Patients with a severe egg allergy should be assessed by a medical provider, RN, or clinical pharmacist. If they receive the influenza vaccine, please have them observed for 15 minutes.)? No    3. Has the patient had an allergic reaction to previous influenza vaccines? No    4. Has the patient had any severe allergic reactions to past influenza vaccines ? No       5. Does patient have a history of Guillain-[place] syndrome? No      Based on responses above, I administered the influenza vaccine.  STAS IZQUIERDO, CMA

## 2018-12-31 NOTE — PATIENT INSTRUCTIONS
Thank you for coming in today    -Your pregnancy test was negative  -Let us know if you are interested in seeing the OB/G physicians. We can set this up for you.   -We'll see you for your annual physical

## 2019-04-03 NOTE — PROGRESS NOTES
Female Physical Note         HPI         Concerns today:     1. New partner, wanting contraception, balancing tobacco, age and fam hx ovarian ca  -would consider tubal procedure, but also wanting to work on not smoking so options exist.  Wants routine screening, no new symptoms or concerns    2. Tobacco; wanting to quit             Review of Systems:     CONSTITUTIONAL: no fatigue, no unexpected change in weight  SKIN: no worrisome rashes, no worrisome moles, no worrisome lesions  EYES: no acute vision problems or changes  ENT: no ear problems, no mouth problems, no throat problems  RESP: no significant cough, no shortness of breath  CV: no chest pain, no palpitations, no new or worsening peripheral edema  GI: no nausea, no vomiting, no constipation, no diarrhea, possibly a hernia sometimes bulges on right since her pregnancy, sometimes painful  MUSCULOSKELETAL:flat feet otherwise ok  ENDOCRINE: some hot flashes, irregular periods  PSYCHIATRIC: normal      Sexually Active: Yes  Sexual concerns: just wants to be checked, often using condoms but not 100%   Contraception:desires  Pregnancy History:   Menses: Patient's last menstrual period was 2019.     STD History: has had  Last Pap Smear Date: 3/3/17: Normal (hx of cone biopsy)      Abnormal Pap History: Details: cone bx  age 14 for high risk hpv    Patient Active Problem List   Diagnosis     Tobacco dependence     History of snoring     Atypical glandular cells on cervical Pap smear     Morbid obesity (H)     Acquired pes planus of both feet       Past Medical History:   Diagnosis Date     Blighted ovum 2018     History of colposcopy with cervical biopsy age 14     History of snoring 2015     Tobacco dependence 2015        Family History   Problem Relation Age of Onset     Colon Cancer Maternal Grandmother 65     Heart Failure Mother      Liver Disease Mother         hepatitis c     Hypertension Mother         living     Coronary Artery  "Disease Mother      Heart Failure Father         living     Coronary Artery Disease Father      Pacemaker Father      Prostate Cancer Father      Myocardial Infarction Maternal Aunt         2 aunts  in 50's      Ovarian Cancer Sister          at age 40     Diabetes No family hx of      Cerebrovascular Disease No family hx of      Thyroid Disease No family hx of      Breast Cancer No family hx of      Reviewed no other significant FH    Family History and past Medical History reviewed and unchanged/updated.  Social History     Tobacco Use     Smoking status: Current Every Day Smoker     Types: Cigarettes     Smokeless tobacco: Never Used     Tobacco comment: 1/2 pack/day   Substance Use Topics     Alcohol use: No     Alcohol/week: 1.2 oz     Types: 2 Standard drinks or equivalent per week     Single  Children ? yes 2 boys (8 and 15)    Has anyone hurt you physically, for example by pushing, hitting, slapping or kicking you or forcing you to have sex? Denies  Do you feel threatened or controlled by a partner, ex-partner or anyone in your life? Denies    RISK BEHAVIORS AND HEALTHY HABITS:  Tobacco Use/Smoking: yes, interested in quitting  Illicit Drug Use: None  Do you use alcohol? yes  Diet (5-7 servings of fruits/veg daily): Yes   Exercise (30 min accumulated most days): sometimes  Seat Belt Use: Yes       Immunization History   Administered Date(s) Administered     Influenza Vaccine IM 3yrs+ 4 Valent IIV4 2018     Tdap (Adacel,Boostrix) 2010    Reviewed Immunization Record Today         Physical Exam:     /83   Pulse 80   Temp 98.1  F (36.7  C) (Oral)   Resp 20   Ht 1.664 m (5' 5.5\")   Wt 106.3 kg (234 lb 6.4 oz)   LMP 2019   SpO2 98%   BMI 38.41 kg/m    GENERAL: healthy, alert and no distress  EYES: Eyes grossly normal to inspection, extraocular movements - intact, and PERRL  HENT: ear canals- normal; TMs- normal; Nose- normal; Mouth- no ulcers, no lesions  NECK: no " tenderness, no adenopathy, no asymmetry, no masses, no stiffness; thyroid- normal to palpation  RESP: lungs clear to auscultation - no rales, no rhonchi, no wheezes  BREAST: no masses, no tenderness, no nipple discharge, no palpable axillary masses or adenopathy  CV: regular rates and rhythm, normal S1 S2, no S3 or S4 and no murmur, no click or rub -  ABDOMEN: soft, no tenderness, no  hepatosplenomegaly, no masses, normal bowel sounds, unable to palpate abdominal wall defect, no definitive hernia  MS: extremities- no gross deformities noted, no edema  SKIN: no suspicious lesions, no rashes  NEURO: strength and tone- normal, sensory exam- grossly normal, mentation- intact, speech- normal, reflexes- symmetric  BACK: no CVA tenderness, no paralumbar tenderness  PSYCH: Alert and oriented times 3; speech- coherent , normal rate and volume; able to articulate logical thoughts, able to abstract reason, no tangential thoughts, no hallucinations or delusions, affect- normal  LYMPHATICS: ant. cervical- normal, post. cervical- normal,     Assessment and Plan      Ning was seen today for physical and medication reconciliation.    Diagnoses and all orders for this visit:    Encounter for other contraceptive management  -     Nonoxynol-9 1000 MG MISC; Place 1 Device vaginally daily as needed (when needed before intercourse)  -     etonogestrel-ethinyl estradiol (NUVARING) 0.12-0.015 MG/24HR vaginal ring; Place 1 each vaginally every 28 days  -     OB/GYN REFERRAL    Tobacco dependence  -     TOBACCO CESSATION C REFERRAL (QUIT PLAN); Future    Screen for STD (sexually transmitted disease)  -     HIV Antigen Antibody Combo [OCX6436]  -     Treponema Abs w Reflex to RPR and Titer [GXP0229]  -     Chlamydia trachomatis PCR [QTQ407]  -     Cancel: Neisseria gonorrhoeae PCR [AQW2802]  -     Chlamydia/Gono Amplified ([facility] [address]); Future  -     Syphilis Screen [place] ([facility] [address]); Future  -     HIV Ag/Ab Screen [place] ([facility] [address]);  Future    Family history of coronary arteriosclerosis  -     Lipid Panel ([place]) - Results < 1 hr      Reviewed risks of nuvaring and patient agrees goal is to stop smoking, unaware of any fam hx of stroke, some heart disease.  Beulah Leon MD

## 2019-04-04 ENCOUNTER — OFFICE VISIT (OUTPATIENT)
Dept: FAMILY MEDICINE | Facility: CLINIC | Age: 34
End: 2019-04-04
Payer: COMMERCIAL

## 2019-04-04 VITALS
BODY MASS INDEX: 37.67 KG/M2 | SYSTOLIC BLOOD PRESSURE: 139 MMHG | WEIGHT: 234.4 LBS | HEART RATE: 80 BPM | DIASTOLIC BLOOD PRESSURE: 83 MMHG | OXYGEN SATURATION: 98 % | RESPIRATION RATE: 20 BRPM | HEIGHT: 66 IN | TEMPERATURE: 98.1 F

## 2019-04-04 DIAGNOSIS — Z11.3 SCREEN FOR STD (SEXUALLY TRANSMITTED DISEASE): ICD-10-CM

## 2019-04-04 DIAGNOSIS — F17.200 TOBACCO DEPENDENCE: ICD-10-CM

## 2019-04-04 DIAGNOSIS — Z30.8 ENCOUNTER FOR OTHER CONTRACEPTIVE MANAGEMENT: Primary | ICD-10-CM

## 2019-04-04 DIAGNOSIS — Z82.49 FAMILY HISTORY OF CORONARY ARTERIOSCLEROSIS: ICD-10-CM

## 2019-04-04 PROBLEM — O02.0 BLIGHTED OVUM: Status: RESOLVED | Noted: 2018-07-25 | Resolved: 2019-04-04

## 2019-04-04 PROBLEM — M25.571 PAIN IN JOINT INVOLVING ANKLE AND FOOT, RIGHT: Status: RESOLVED | Noted: 2018-01-19 | Resolved: 2019-04-04

## 2019-04-04 RX ORDER — ETONOGESTREL AND ETHINYL ESTRADIOL VAGINAL RING .015; .12 MG/D; MG/D
1 RING VAGINAL
Qty: 3 EACH | Refills: 3 | Status: SHIPPED | OUTPATIENT
Start: 2019-04-04 | End: 2020-03-01

## 2019-04-04 ASSESSMENT — MIFFLIN-ST. JEOR: SCORE: 1767.04

## 2019-04-05 DIAGNOSIS — Z11.3 SCREEN FOR STD (SEXUALLY TRANSMITTED DISEASE): ICD-10-CM

## 2019-04-05 LAB
CHOLEST SERPL-MCNC: 117 MG/DL
FASTING?: NORMAL
HDLC SERPL-MCNC: 63 MG/DL
HIV 1+2 AB+HIV1 P24 AG SERPL QL IA: NEGATIVE
LDLC SERPL CALC-MCNC: 32 MG/DL
TRIGL SERPL-MCNC: 110 MG/DL

## 2019-04-05 NOTE — PATIENT INSTRUCTIONS
Referral for OB/GYN faxed to [facility]  [phone]  [phone]  Patient will be contacted to schedule appointment.

## 2019-04-06 LAB — TREPONEMA ANTIBODY (SYPHILIS): NEGATIVE

## 2019-04-06 NOTE — RESULT ENCOUNTER NOTE
Call patient:    Cholesterol levels are great.      One test for STDs was negative, need to draw for all testing.

## 2019-04-08 LAB
C TRACH RRNA SPEC QL NAA+PROBE: NEGATIVE
N GONORRHOEA RRNA SPEC QL NAA+PROBE: NEGATIVE

## 2019-08-19 ENCOUNTER — TELEPHONE (OUTPATIENT)
Dept: FAMILY MEDICINE | Facility: CLINIC | Age: 34
End: 2019-08-19

## 2019-08-19 NOTE — TELEPHONE ENCOUNTER
[facility] phone call message- general phone call:    Reason for call: Patient calling to schedule OB care for next week, but at this point she is not sure about the pregnancy. She wants to do ob care and decide from there on. She has upt confirmation at [facility] care clinic one of the free clinic, she state she is 8 weeks right now. Told her that an OB nurse will call her to do intake and pick a Doctor for her who will be able to deliver her base on her decision with the pregnancy. She understands and is okay to wait for a call to schedule. Please call and advise.     Return call needed: Yes    OK to leave a message on voice mail?     Primary language: English      needed? No    Call taken on August 19, 2019 at 4:38 PM by Jackson Lamb

## 2019-08-21 NOTE — TELEPHONE ENCOUNTER
Ning returned phone call. Stated she only has 15 minutes and is at work, stated to call at 4pm. Will call patient at 4pm today. Cassandra MALIK

## 2019-08-21 NOTE — TELEPHONE ENCOUNTER
Second attempt to reach patient, no answer. Did not leave VM r/t previous left yesterday. Will attempt to contact next week if no return call was made. Cassandra MALIK

## 2019-08-21 NOTE — TELEPHONE ENCOUNTER
OB intake completed over telephone with patient. Ning is  with most recent pregnancy an SAB at 11weeks with blighten ovum. Ning is unsure if she wants to continue with the pregnancy but would like to have OB care until her decision.Father of baby is different from previous, he is currently unemployed r/t disability. Ning would like her cousin, whom she lives with, as her emergency contact throughout the pregnancy. 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 Ning had her confirmation of pregnancy at a free clinic, willing to sign release at first appointment, and estimates approximately 8 weeks GA but unsure of LMP per patient. Significant history of abnormal pap smear colposcopy had concern for HPV but ultimately came back negative. Ning smokes cigarettes off and on per patient. No other health history noted. Ning stated she is okay seeing a male provider, she is scheduled with  on 2019 for her NOB appointment.    Average Risk Category  No significant risk factors: No    At Risk Category (up to 3)  Teen pregnancy: No  Poor social situation: No  Domestic abuse: No  Financial difficulties: No  Smoker: Yes  H/O  deliver: No  H/O drug abuse: No  Non-English speaking: No  Advanced maternal age: No  GDM risks: No  Previous C/S: No  H/O PIH: No  H/O STIs: No  H/O mental health concerns: No  Onset care > 20 weeks: No  Other:     High Risk Category (4 or more At Risk or)  Diabetes/GDM: No  Multiple gestation: No  Chronic hypertension: No  Significant hx of asthma: No  Fetal demise > 20 weeks: No  Positive tox screen: No  Current mental health treatment: No  Other:     Risk: Average Risk   Date determined: 2019   HELENA Trujillo

## 2019-09-03 ENCOUNTER — TELEPHONE (OUTPATIENT)
Dept: FAMILY MEDICINE | Facility: CLINIC | Age: 34
End: 2019-09-03

## 2019-09-03 ENCOUNTER — OFFICE VISIT (OUTPATIENT)
Dept: FAMILY MEDICINE | Facility: CLINIC | Age: 34
End: 2019-09-03
Payer: MEDICAID

## 2019-09-03 VITALS
RESPIRATION RATE: 18 BRPM | OXYGEN SATURATION: 98 % | DIASTOLIC BLOOD PRESSURE: 86 MMHG | WEIGHT: 235.6 LBS | HEART RATE: 84 BPM | BODY MASS INDEX: 39.25 KG/M2 | TEMPERATURE: 98.2 F | SYSTOLIC BLOOD PRESSURE: 126 MMHG | HEIGHT: 65 IN

## 2019-09-03 DIAGNOSIS — Z34.91 NORMAL PREGNANCY IN FIRST TRIMESTER: Primary | ICD-10-CM

## 2019-09-03 DIAGNOSIS — F17.200 TOBACCO DEPENDENCE: ICD-10-CM

## 2019-09-03 LAB
BILIRUBIN UR: NEGATIVE
BLOOD UR: NEGATIVE
GLUCOSE URINE: NEGATIVE
HCT VFR BLD AUTO: 36.9 % (ref 35–47)
HEMOGLOBIN: 11.3 G/DL (ref 11.7–15.7)
HIV 1+2 AB+HIV1 P24 AG SERPL QL IA: NEGATIVE
KETONES UR QL: NEGATIVE
LEUKOCYTE ESTERASE UR: NEGATIVE
MCH RBC QN AUTO: 27.6 PG (ref 26.5–35)
MCHC RBC AUTO-ENTMCNC: 30.6 G/DL (ref 32–36)
MCV RBC AUTO: 90 FL (ref 78–100)
NITRITE UR QL STRIP: NEGATIVE
PH UR STRIP: 7 [PH] (ref 4.5–8)
PLATELET # BLD AUTO: 284 K/UL (ref 150–450)
PROTEIN UR: NEGATIVE
RBC # BLD AUTO: 4.1 M/UL (ref 3.8–5.2)
SP GR UR STRIP: 1.02 (ref 1–1.03)
UROBILINOGEN UR STRIP-ACNC: NORMAL
WBC # BLD AUTO: 5.2 K/UL (ref 4–11)

## 2019-09-03 RX ORDER — PRENATAL VIT/IRON FUM/FOLIC AC 27MG-0.8MG
1 TABLET ORAL DAILY
Qty: 90 TABLET | Refills: 3 | Status: SHIPPED | OUTPATIENT
Start: 2019-09-03 | End: 2020-07-15

## 2019-09-03 ASSESSMENT — MIFFLIN-ST. JEOR: SCORE: 1764.55

## 2019-09-03 NOTE — TELEPHONE ENCOUNTER
Called patient back and spoke to her directly. Patient agreed to come in on 9/30/19 at 420pm for her return OB.

## 2019-09-03 NOTE — TELEPHONE ENCOUNTER
----- Message from Dante Nuñez MD sent at 9/3/2019 12:55 PM CDT -----  Hey team,    Ning had to leave before we could get follow up appointment scheduled. Can we get her set up to see me again in 4 weeks?    Thanks,  German

## 2019-09-03 NOTE — NURSING NOTE
Scheduled patient at [facility] for 09/10/2019 at 4:15pm. Patient updated with location, date, time, and phone number. Will fax pertinent encounter details once labs result and note is signed by provider. Cassandra MALIK
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
## 2019-09-03 NOTE — PROGRESS NOTES
Preceptor Attestation:   Patient seen, evaluated and discussed with the resident. I have verified the content of the note, which accurately reflects my assessment of the patient and the plan of care.  Supervising Physician:Ky Yancey MD  [facility]

## 2019-09-03 NOTE — PATIENT INSTRUCTIONS
[facility] Information  If you have any further concerns or wish to schedule another appointment, please call our office at [phone] during normal business hours (8-5, M-F).      For uncomplicated pregnancies, you will be seeing your doctor once a month until you are 28 weeks, then you will see your doctor twice a month. You will begin weekly visits at 36 weeks until you deliver.      If you have urgent medical questions that cannot wait, you may call [phone] at any time of day. Call your doctor if you experience any bleeding or abdominal cramping early in pregnancy.      If you have a medical emergency, please call [phone].  Tips to Relieve Nausea  Although nausea can occur at any time of the day, it may be worse in the morning. To help prevent nausea:    Eat small, light meals at frequent intervals.    Get up slowly. Eat a few unsalted crackers before you get out of bed.    Drink water or 7-up to soothe your stomach.    Eat an ice pop in your favorite flavor.    Ask your health care provider about taking regan or vitamin B6 for nausea and vomiting.    Talk with your health care provider if you take vitamins that upset your stomach.  Safe Medications    Take one prenatal vitamin with at least 400 mcg of folic acid daily.    Use acetaminophen (Tylenol) for pain.     Try Maalox or Tums for heartburn. If these are not working, talk to your doctor about trying a different medication.    Diphenhydramine (Benadryl) can also be used safely in pregnancy.    Talk to your doctor about any other medications or vitamins you are taking!  Start Healthy Habits Now  What matters most is protecting your baby from this moment on. If you smoke, drink alcohol, or use drugs, now is the time to stop. If you need help, talk with your health care provider.    Smoking increases the risk of miscarriage or having a low-birth-weight baby. If you smoke, quit now.    Alcohol and drugs have been linked with miscarriage, birth  defects, intellectual disability, and low birth weight. Do not drink alcohol or take drugs.    Continue your current exercise routine, or start one with walking, swimming, and other low impact exercises. Yoga specifically designed for pregnant moms is a great stress and pain reliever. Keep your self well hydrated and avoid overheating with all activity. If bleeding, fluid leakage, or cramping/contractions occur, stop the exercise and call your doctor.     Wear your seatbelt every time you are in the car. Fasten the lap part underneath your growing belly and the shoulder part as you normally would.   Weight Gain    Add an additional 300 to 400 calories a day into your diet.    Ideal weight gain is 25 to 35 pounds.    If your BMI is over 30 (BMI 39 today, 9/3/19), your ideal weight gain is 15 pounds.    If your BMI is under 20, your ideal weight gain is 40 pounds.    Talk to your doctor if you are concerned about weight gain.   Keep Your Environment Save  You can still clean house and use scented products. Just take some simple precautions:    Wear gloves when using cleaning fluids.    Open windows to let in fresh air. Use a fan if you paint.    Avoid secondhand smoke.    Don t breathe fumes from nail polish, hair spray, cleansers, or other chemicals.  Work Concerns  The end of the first trimester is a good time to discuss working during pregnancy with your employer. Follow your health care provider s advice if your job requires you to stand for a long time, work with hazardous tools, or even sit at a desk all day. Your workspace, workload, or scheduled hours may need to be adjusted - perhaps you can change body postures more often or take an extra break.  Intimacy  Unless your health care provider tells you to, there is no reason to stop having sex while you re pregnant. You or your partner may notice changes in desire. Desire may be less in the first trimester, due to nausea and fatigue. In the second trimester, sex  may be very enjoyable. The third trimester can be a challenge comfort-wise. Try different positions and see what s best for you both. As always, let your doctor know if you experience any bleeding, leakage of fluids, or cramping/contractions.  Other Pregnancy Concerns    Limit the amount of radiation you are exposed to. Always tell the radiology technologist that you are pregnant if having an xray or CT scan done.     Practice good hand washing to prevent infection.    Avoid cat litter.     Wash all fruits and vegetabes. Always cook meat thoroughly and do not eat raw fish. Do not eat more than 6 to 12 ounces of other fish sources.     Do not eat soft cheeses.    Limit caffeine to less than 300 milligrams a days. The average cup of coffee as approximately 120 milligrams of caffeine.

## 2019-09-04 LAB
ABO + RH BLD: NORMAL
BLD GP AB SCN SERPL QL: NEGATIVE
CULTURE: NORMAL
HBV SURFACE AG SERPL QL IA: NEGATIVE
REPEAT ABO/RH TYPING (HML): NORMAL
RUBV IGG SERPL QL IA: POSITIVE
TREPONEMA ANTIBODY (SYPHILIS): NEGATIVE

## 2019-09-10 ENCOUNTER — TRANSFERRED RECORDS (OUTPATIENT)
Dept: HEALTH INFORMATION MANAGEMENT | Facility: CLINIC | Age: 34
End: 2019-09-10

## 2019-09-12 ENCOUNTER — TRANSFERRED RECORDS (OUTPATIENT)
Dept: HEALTH INFORMATION MANAGEMENT | Facility: CLINIC | Age: 34
End: 2019-09-12

## 2019-09-30 ENCOUNTER — OFFICE VISIT (OUTPATIENT)
Dept: FAMILY MEDICINE | Facility: CLINIC | Age: 34
End: 2019-09-30
Payer: MEDICAID

## 2019-09-30 VITALS
HEART RATE: 81 BPM | OXYGEN SATURATION: 99 % | HEIGHT: 67 IN | TEMPERATURE: 98.5 F | SYSTOLIC BLOOD PRESSURE: 122 MMHG | RESPIRATION RATE: 16 BRPM | WEIGHT: 244.2 LBS | BODY MASS INDEX: 38.33 KG/M2 | DIASTOLIC BLOOD PRESSURE: 75 MMHG

## 2019-09-30 DIAGNOSIS — F17.200 TOBACCO DEPENDENCE: ICD-10-CM

## 2019-09-30 DIAGNOSIS — O99.011 ANEMIA DURING PREGNANCY IN FIRST TRIMESTER: ICD-10-CM

## 2019-09-30 DIAGNOSIS — Z34.82 ENCOUNTER FOR SUPERVISION OF OTHER NORMAL PREGNANCY IN SECOND TRIMESTER: Primary | ICD-10-CM

## 2019-09-30 ASSESSMENT — MIFFLIN-ST. JEOR: SCORE: 1827.37
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
## 2019-09-30 NOTE — PATIENT INSTRUCTIONS
For uncomplicated pregnancies, you will be seeing your doctor once a month until you are 28 weeks, then you will see your doctor twice a month. You will begin weekly visits at 36 weeks until you deliver.     If you have urgent medical questions that cannot wait, you may call [phone] at any time of day. Call your doctor if you experience any bleeding or abdominal cramping early in pregnancy.     If you have a medical emergency, please call 911.    When to call or come in to [facility] ([phone] for Labor & Delivery unit)    If you have any bleeding or leakage of fluids.     If you have uterine cramping that does not resolve with rest and fluids.    If you have a headache not resolved with tylenol, right upper abdominal pain, or sudden onset of swelling.    You know your body best. Never hesitate to call or go to the hospital if something doesn't feel right!    Genetic Screening    Sampling of your blood to screen for genetic abnormalities, like Down's syndrome, in your baby    Done at 16-18 weeks gestation    Screening test only - further testing, like advanced ultrasound or amniocentesis, would be needed to confirm positive test!    Recommended for mothers over age 35, history of genetic abnormalities,     Talk to your doctor if you have further questions, or are interested in this screening test.     Breastfeeding    Breast feeding is best, for you and baby!     Recommendations are for exclusive breastfeeding for babies first 6 months of life.    Talk to your doctor if you have more questions about breastfeeding your baby.    Other Pregnancy Concerns    Continue to take a prenatal vitamin daily    Maintain an active, healthy lifestyle.     If this is your first baby, you can expect to start feeling movement at 20-24 weeks gestation. If this is your second or more pregnancy, you will generally start feeling movement at 18-22 weeks gestation.

## 2019-10-01 ENCOUNTER — TELEPHONE (OUTPATIENT)
Dept: FAMILY MEDICINE | Facility: CLINIC | Age: 34
End: 2019-10-01

## 2019-10-01 NOTE — TELEPHONE ENCOUNTER
Called [facility] medical records department. Left detailed VM requesting records from genetic testing be faxed to our clinic. Will attempt again Thursday of this week if not received. Cassandra MALIK

## 2019-11-01 ENCOUNTER — OFFICE VISIT (OUTPATIENT)
Dept: FAMILY MEDICINE | Facility: CLINIC | Age: 34
End: 2019-11-01
Payer: COMMERCIAL

## 2019-11-01 VITALS
TEMPERATURE: 98.2 F | WEIGHT: 251 LBS | SYSTOLIC BLOOD PRESSURE: 123 MMHG | OXYGEN SATURATION: 98 % | HEIGHT: 65 IN | DIASTOLIC BLOOD PRESSURE: 75 MMHG | BODY MASS INDEX: 41.82 KG/M2 | RESPIRATION RATE: 18 BRPM | HEART RATE: 83 BPM

## 2019-11-01 DIAGNOSIS — Z34.82 ENCOUNTER FOR SUPERVISION OF OTHER NORMAL PREGNANCY IN SECOND TRIMESTER: Primary | ICD-10-CM

## 2019-11-01 DIAGNOSIS — F17.200 TOBACCO DEPENDENCE: ICD-10-CM

## 2019-11-01 LAB
% GRANULOCYTES: 62.2 %G (ref 40–75)
GRANULOCYTES #: 4.7 K/UL (ref 1.6–8.3)
HCT VFR BLD AUTO: 35.1 % (ref 35–47)
HEMOGLOBIN: 11 G/DL (ref 11.7–15.7)
LYMPHOCYTES # BLD AUTO: 2.3 K/UL (ref 0.8–5.3)
LYMPHOCYTES NFR BLD AUTO: 30.3 %L (ref 20–48)
MCH RBC QN AUTO: 28.3 PG (ref 26.5–35)
MCHC RBC AUTO-ENTMCNC: 31.3 G/DL (ref 32–36)
MCV RBC AUTO: 90.3 FL (ref 78–100)
MID #: 0.6 K/UL (ref 0–2.2)
MID %: 7.5 %M (ref 0–20)
PLATELET # BLD AUTO: 277 K/UL (ref 150–450)
RBC # BLD AUTO: 3.89 M/UL (ref 3.8–5.2)
WBC # BLD AUTO: 7.5 K/UL (ref 4–11)

## 2019-11-01 ASSESSMENT — MIFFLIN-ST. JEOR: SCORE: 1834.41

## 2019-11-01 NOTE — PATIENT INSTRUCTIONS
[facility] Information  Your resident OB Doctor is Dr. German Nuñez    If you have any further concerns or wish to schedule another appointment, please call our office at [phone] during normal business hours (8-5, M-F).     For uncomplicated pregnancies, you will be seeing your doctor once a month until you are 28 weeks, then you will see your doctor twice a month. You will begin weekly visits at 36 weeks until you deliver.     If you have urgent medical questions that cannot wait, you may call [phone] at any time of day. Call your doctor if you experience any bleeding or abdominal cramping early in pregnancy.     If you have a medical emergency, please call [phone].    When to call or come in to [facility] ([phone] for Labor & Delivery unit)    If you have any bleeding or leakage of fluids.     If you have lower abdominal/uterine cramping not relieved with rest and fluids.    If you have a headache not resolved with tylenol, right upper abdominal pain, or sudden onset of swelling.    You know your body best. Never hesitate to call or go to the hospital if something doesn't feel right!    Fetal Survey Ultrasound  We will arrange for an ultrasound around 22 weeks gestation. These are done in clinic on  and . Many families look forward to this visit as a chance to try and determine the babies gender - however, it is an important exam to ensure that baby is developing and growing normally!    Consider childbirth education classes  Childbirth classes are a great way to learn what to expect from the remainder of pregnancy, tips for preparing and handeling the stresses of labor, and learning more about your . Classes are also great for learning more about breastfeeding! Discuss options for classes with your doctor if you are interested.

## 2019-11-01 NOTE — PROGRESS NOTES
Ning Giraldo is a 33 year old  female who presents to clinic for a follow up OB visit. She is currently 19w4d with an estimated date of delivery of Mar 23, 2020 via LMP. She denies headache, chest pain, shortness of breath, abdominal pain/contractions, vaginal bleeding, or abnormal discharge. She has felt baby move.     New concerns today: None    Smoking an occassional cigarette, but nothing on a regular basis.    OB History    Para Term  AB Living   5 2 2 0 2 2   SAB TAB Ectopic Multiple Live Births   1 0 0 0 0      # Outcome Date GA Lbr Luisito/2nd Weight Sex Delivery Anes PTL Lv   5 Current            4 SAB 18 11w0d    SAB  N    3 AB            2 Term            1 Term               Obstetric Comments   Blighted ovum at 11weeks confirmed via US and beta-quant in ED. Cassandra MALIK       Physical exam:  LMP 2019     General: alert, female in no acute distress  Abdomen: gravid uterus appropriate for gestation age above pubic symphysis, non-tender, FHTs 136 bpm  Extremities: no edema  Psych: Extremely flat affect    Assessment/Plan:  Patient Active Problem List   Diagnosis     Tobacco dependence     History of snoring     Atypical glandular cells on cervical Pap smear     Morbid obesity (H)     Acquired pes planus of both feet     Encounter for supervision of other normal pregnancy in second trimester       Fetal anatomy ultrasound planned for 22 weeks.  Discussed expectations of second trimester and when to call/come in.  Continued encouragement of breastfeeding.    1. Encounter for supervision of other normal pregnancy in second trimester  Hemoglobin at 11.0 today. Check again in 3rd trimester. No additional iron supplement recommended.  - CBC with Diff Plt ([facility])  - US OB > 14 Weeks; Future    2. Tobacco dependence  Addressed smoking again today. Counseled on cessation.    Follow up in 4 weeks for routine prenatal visit and fetal survey.    Dante Nuñez MD  [facility] Resident, PGY2      Precepted with: Karla Velasquez MD

## 2019-11-04 ENCOUNTER — MEDICAL CORRESPONDENCE (OUTPATIENT)
Dept: HEALTH INFORMATION MANAGEMENT | Facility: CLINIC | Age: 34
End: 2019-11-04

## 2019-11-07 DIAGNOSIS — Z34.82 ENCOUNTER FOR SUPERVISION OF OTHER NORMAL PREGNANCY IN SECOND TRIMESTER: ICD-10-CM

## 2019-11-26 ENCOUNTER — TELEPHONE (OUTPATIENT)
Dept: FAMILY MEDICINE | Facility: CLINIC | Age: 34
End: 2019-11-26

## 2019-11-26 NOTE — TELEPHONE ENCOUNTER
Called patient and left VM requesting call back. Patient needs to reschedule MUSA with  r/t no-show. Cassandra MALIK

## 2019-12-03 ENCOUNTER — TELEPHONE (OUTPATIENT)
Dept: FAMILY MEDICINE | Facility: CLINIC | Age: 34
End: 2019-12-03

## 2019-12-03 NOTE — TELEPHONE ENCOUNTER
Called patient, no answer. I left a voicemail to return call the [facility]. I will call again later.

## 2019-12-03 NOTE — TELEPHONE ENCOUNTER
----- Message from ROSALIE Brush sent at 12/3/2019 10:06 AM CST -----  Luis Eduardo Ardon, can you help with this one. Thanks  ----- Message -----  From: Dante Nuñez MD  Sent: 12/3/2019   7:52 AM CST  To: Pv Yellow Team    Ning Gilbert missed her OB appointment last week. Would you mind calling to make sure she gets one scheduled?     Thanks!  German

## 2019-12-04 NOTE — MR AVS SNAPSHOT
After Visit Summary   6/21/2018    Ning Giraldo    MRN: [id number]           Patient Information     Date Of Birth          1985        Visit Information        Provider Department      6/21/2018 4:20 PM Davion Ewing MD [facility]        Today's Diagnoses     Screening for condition    -  1    Encounter for supervision of other normal pregnancy in first trimester        Vaginal bleeding           Follow-ups after your visit        Follow-up notes from your care team     Return in about 3 days (around 6/24/2018) for Lab Work.      Who to contact     Please call your clinic at [phone] to:    Ask questions about your health    Make or cancel appointments    Discuss your medicines    Learn about your test results    Speak to your doctor            Additional Information About Your Visit        Collecthart Information     [employer] gives you secure access to your electronic health record. If you see a primary care provider, you can also send messages to your care team and make appointments. If you have questions, please call your primary care clinic.  If you do not have a primary care provider, please call [phone] and they will assist you.      [employer] is an electronic gateway that provides easy, online access to your medical records. With [employer], you can request a clinic appointment, read your test results, renew a prescription or communicate with your care team.     To access your existing account, please contact your [facility] or call [phone] for assistance.        Care EveryWhere ID     This is your Care EveryWhere ID. This could be used by other organizations to access your [place] medical records  [id number]        Your Vitals Were     Pulse Temperature Pulse Oximetry BMI (Body Mass Index)          75 98  F (36.7  C) (Oral) 100% 39.18 kg/m2         Blood Pressure from Last 3 Encounters:   06/21/18 110/71   04/19/18 132/83   03/23/18 (!)  Antonia is a 29 year old year old female here for an OB check.  She is      .  Gestational Age: 18w5d.  Concerns today include: none    She reports fetal movement.   She denies bleeding.  She reports cramping.  She denies nausea.  She denies breast tenderness.    GLU:neg  TAYLOR: neg  KET: neg  S.015  BLO:neg  PH: 7.0  PRO: neg  URO: 0.2  NIT: neg LEUK:   neg    If your visit includes an exam today, would you like an assistant to be present in the room during that time?no    Babyscripts application verified: NO  Babysripts pledge card signed and more information about Babyscripts given to patient NO       139/93    Weight from Last 3 Encounters:   06/21/18 243 lb 12.8 oz (110.6 kg)   03/23/18 233 lb (105.7 kg)   01/19/18 233 lb 9.6 oz (106 kg)              We Performed the Following     Beta-HCG Quantitative ([facility])     Chlamydia/Gono Amplified ([facility])          Today's Medication Changes          These changes are accurate as of 6/21/18 11:59 PM.  If you have any questions, ask your nurse or doctor.               Start taking these medicines.        Dose/Directions    prenatal multivitamin plus iron 27-0.8 MG Tabs per tablet   Used for:  Encounter for supervision of other normal pregnancy in first trimester        Dose:  1 tablet   Take 1 tablet by mouth daily   Quantity:  100 tablet   Refills:  3         Stop taking these medicines if you haven't already. Please contact your care team if you have questions.     clotrimazole-betamethasone cream   Commonly known as:  LOTRISONE           norelgestromin-ethinyl estradiol 150-35 MCG/24HR patch   Commonly known as:  ORTHO EVRA                Where to get your medicines      These medications were sent to [id number] - [place], [place] - [address], [place] [address]     Phone:  [phone]     prenatal multivitamin plus iron 27-0.8 MG Tabs per tablet                Primary Care Provider Office Phone # Fax #    Katie Emili Rangel [phone]       [facility] [address] [place] [address]        Equal Access to Services     SAMUEL HOLBROOK AH: Hadii aad ku hadasho Soomaali, waaxda luqadaha, qaybta kaalmada adeegyada, waxay tonja miguel. So [facility] [phone].    ATENCIÓN: Si habla nir, tiene a gardner disposición servicios gratuitos de asistencia lingüística. Llame al [phone].    We comply with applicable federal civil rights laws and [place] laws. We do not discriminate on the basis of race, color, national origin,  age, disability, sex, sexual orientation, or gender identity.            Thank you!     Thank you for choosing [facility]  for your care. Our goal is always to provide you with excellent care. Hearing back from our patients is one way we can continue to improve our services. Please take a few minutes to complete the written survey that you may receive in the mail after your visit with us. Thank you!             Your Updated Medication List - Protect others around you: Learn how to safely use, store and throw away your medicines at www.disposemymeds.org.          This list is accurate as of 6/21/18 11:59 PM.  Always use your most recent med list.                   Brand Name Dispense Instructions for use Diagnosis    prenatal multivitamin plus iron 27-0.8 MG Tabs per tablet     100 tablet    Take 1 tablet by mouth daily    Encounter for supervision of other normal pregnancy in first trimester       terbinafine 1 % cream    lamISIL AT    42 g    Apply topically 2 times daily    Tinea corporis

## 2019-12-05 ENCOUNTER — OFFICE VISIT (OUTPATIENT)
Dept: FAMILY MEDICINE | Facility: CLINIC | Age: 34
End: 2019-12-05
Payer: COMMERCIAL

## 2019-12-05 VITALS
HEART RATE: 82 BPM | WEIGHT: 251.2 LBS | OXYGEN SATURATION: 99 % | DIASTOLIC BLOOD PRESSURE: 72 MMHG | SYSTOLIC BLOOD PRESSURE: 121 MMHG | BODY MASS INDEX: 41.85 KG/M2 | HEIGHT: 65 IN | TEMPERATURE: 97.6 F | RESPIRATION RATE: 20 BRPM

## 2019-12-05 DIAGNOSIS — Z34.82 ENCOUNTER FOR SUPERVISION OF OTHER NORMAL PREGNANCY IN SECOND TRIMESTER: Primary | ICD-10-CM

## 2019-12-05 DIAGNOSIS — F17.200 TOBACCO DEPENDENCE: ICD-10-CM

## 2019-12-05 ASSESSMENT — MIFFLIN-ST. JEOR: SCORE: 1835.32

## 2019-12-05 NOTE — PATIENT INSTRUCTIONS
[facility] Information  Your resident OB Doctor is Dr. German Nuñez.    If you have any further concerns or wish to schedule another appointment, please call our office at [phone] during normal business hours (8-5, M-F).     For uncomplicated pregnancies, you will be seeing your doctor once a month until you are 28 weeks, then you will see your doctor twice a month. You will begin weekly visits at 36 weeks until you deliver.     If you have urgent medical questions that cannot wait, you may call [phone] at any time of day.     If you have a medical emergency, please call [phone].     When to call or come in to [facility] ([phone] for Labor & Delivery unit)    If you notice a decrease in your baby's movement.     If your begin to experience contractions that are 5 minutes or less apart and lasting for over 45 seconds, or if contractions are becoming more painful.    If you have any bleeding or leakage of fluids.     If you have a headache not resolved with tylenol, right upper abdominal pain, or sudden onset of swelling.    You know your body best. Never hesitate to call or go to the hospital if something doesn't feel right!    Gestational Diabetes Screen  At your next visit, you will be screened for gestational diabetes. You will drink a sugary drink and then have your blood drawn to see how your body responds to a sugar load. This test takes about an hour to complete - please plan your schedule accordingly!    The Benefits of Breastfeeding   Breastfeeding gives your new baby the very best start. It supplies nutrition, comfort, and love. Experts agree: Breastfeeding is the healthiest choice for babies during the first year of life and beyond. It s healthy for Mom, too. Breastfeeding may be challenging at first. But with support, you and your baby can succeed together.      Healthiest for Baby   Breastmilk is the ideal food for babies. It has all the nutrients your little one needs to grow  healthy and strong. Here are some of the many benefits for your baby:     Breastfeeding provides contact that babies love. Frequent skin-to-skin time with Mom is calming and comforting.     Breastmilk is full of antibodies. These are substances that help your baby fight infection. Breastmilk reduces your baby's risk of respiratory illnesses, ear infections, and diarrhea.     Breastfeeding reduces your baby s risk of allergies, colds, and many other diseases.     Breastmilk changes as your baby grows, meeting her changing needs.     Breastmilk is easy for your baby to digest.     Breastmilk contains DHA, a fat that is good for your baby s developing brain and eyes.     Breastmilk decreases the risk of sudden infant death syndrome (SIDS).    Healthiest for Mom   For many women, breastfeeding is an amazing experience. It creates a strong bond between mother and baby. Other benefits for Mom include:     You can feel proud knowing that your baby is growing healthy and strong because of your milk.     Breastmilk is convenient. It s free, clean, and always the right temperature.     Breastfeeding burns calories. This can help you lose pregnancy weight faster.     Breastfeeding releases hormones that contract the uterus. This helps the uterus return to its normal size after childbirth.     Mothers who breastfeed have a decreased risk of ovarian and breast cancers.  There are many people who can help you learn to breastfeed. A lactation consultant is a healthcare provider specially trained to help breastfeeding moms. A lactation consultant will visit you after delivery and is available after your baby is born - if you'd like to meet with lactation prior to delivery, let your doctor know!

## 2019-12-05 NOTE — PROGRESS NOTES
Ning Giraldo is a 33 year old  female who presents to clinic for a follow up OB visit. She is currently 24w3d with an estimated date of delivery of Mar 23, 2020 via LMP. She denies headache, chest pain, shortness of breath, abdominal pain/contractions, vaginal bleeding, or abnormal discharge. She has felt baby move.     New concerns today: None.     OB History    Para Term  AB Living   5 2 2 0 2 2   SAB TAB Ectopic Multiple Live Births   1 0 0 0 0      # Outcome Date GA Lbr Luisito/2nd Weight Sex Delivery Anes PTL Lv   5 Current            4 SAB 18 11w0d    SAB  N    3 AB            2 Term            1 Term               Obstetric Comments   Blighted ovum at 11weeks confirmed via US and beta-quant in ED. Cassandra MALIK       Physical exam:  LMP 2019     General: alert, female in no acute distress  Abdomen: gravid uterus appropriate for gestation age at 26 cm above pubic symphysis, non-tender, FHTs 134  Extremities: no edema    Assessment/Plan:  Patient Active Problem List   Diagnosis     Tobacco dependence     History of snoring     Atypical glandular cells on cervical Pap smear     Morbid obesity (H)     Acquired pes planus of both feet     Encounter for supervision of other normal pregnancy in second trimester       Fetal survey ultrasound done 19.  Patient is not planning to complete childbirth education classes.   If boy, would to have him circumcised.  Discussed pre-term labor signs and symptoms and when to call/come in.   Continued encouragement of breastfeeding. She is planning to bottle feed.   Discussed expectations for gestational diabetes screen to be completed at next visit. None in other pregnancies.     Follow up in 4 weeks for routine prenatal visit.     1. Encounter for supervision of other normal pregnancy in second trimester  Difficult to measure secondary to obesity and difficult to palpate the fundus. Size is measuring greater than dates though. I think she would  benefit from another US to make sure baby is growing appropriately. Otherwise things are going well.     2. Tobacco dependence  Not smoking cigarettes anymore. It's been a month since last cigarette.     Dante Nuñez MD  [facility] Resident, PGY2      Precepted with: Ky Yancey MD

## 2020-01-02 ENCOUNTER — OFFICE VISIT (OUTPATIENT)
Dept: FAMILY MEDICINE | Facility: CLINIC | Age: 35
End: 2020-01-02
Payer: COMMERCIAL

## 2020-01-02 VITALS
SYSTOLIC BLOOD PRESSURE: 118 MMHG | TEMPERATURE: 97.8 F | OXYGEN SATURATION: 94 % | HEART RATE: 96 BPM | DIASTOLIC BLOOD PRESSURE: 73 MMHG | RESPIRATION RATE: 20 BRPM

## 2020-01-02 DIAGNOSIS — Z34.83 ENCOUNTER FOR SUPERVISION OF OTHER NORMAL PREGNANCY IN THIRD TRIMESTER: Primary | ICD-10-CM

## 2020-01-02 DIAGNOSIS — Z23 NEED FOR PROPHYLACTIC VACCINATION AND INOCULATION AGAINST INFLUENZA: ICD-10-CM

## 2020-01-02 LAB
% GRANULOCYTES: 63.7 %G (ref 40–75)
GLU GEST SCREEN 1HR 50G: 125 (ref 0–129)
GRANULOCYTES #: 4.8 K/UL (ref 1.6–8.3)
HCT VFR BLD AUTO: 38 % (ref 35–47)
HEMOGLOBIN: 11.4 G/DL (ref 11.7–15.7)
LYMPHOCYTES # BLD AUTO: 2.2 K/UL (ref 0.8–5.3)
LYMPHOCYTES NFR BLD AUTO: 28.4 %L (ref 20–48)
MCH RBC QN AUTO: 27.6 PG (ref 26.5–35)
MCHC RBC AUTO-ENTMCNC: 30 G/DL (ref 32–36)
MCV RBC AUTO: 91.9 FL (ref 78–100)
MID #: 0.6 K/UL (ref 0–2.2)
MID %: 7.9 %M (ref 0–20)
PLATELET # BLD AUTO: 286 K/UL (ref 150–450)
RBC # BLD AUTO: 4.13 M/UL (ref 3.8–5.2)
WBC # BLD AUTO: 7.6 K/UL (ref 4–11)

## 2020-01-02 NOTE — PROGRESS NOTES
Ning Giraldo is a 34 year old  female who presents to clinic for a follow up OB visit. She is currently 28w3d with an estimated date of delivery of Mar 23, 2020 via LMP. She denies headache, chest pain, shortness of breath, abdominal pain/contractions, vaginal bleeding, or abnormal discharge. She has felt baby move.     New concerns today: Just having some sore legs.    OB History    Para Term  AB Living   5 2 2 0 2 2   SAB TAB Ectopic Multiple Live Births   1 0 0 0 0      # Outcome Date GA Lbr Luisito/2nd Weight Sex Delivery Anes PTL Lv   5 Current            4 SAB 18 11w0d    SAB  N    3 AB            2 Term            1 Term               Obstetric Comments   Blighted ovum at 11weeks confirmed via US and beta-quant in ED. Cassandra MALIK       Physical exam:  LMP 2019     General: alert, female in no acute distress  Abdomen: gravid uterus appropriate for gestation age at 29 cm above pubic symphysis, non-tender, FHTs 132  Extremities: no edema    Assessment/Plan:  Patient Active Problem List   Diagnosis     Tobacco dependence     History of snoring     Atypical glandular cells on cervical Pap smear     Morbid obesity (H)     Acquired pes planus of both feet     Encounter for supervision of other normal pregnancy in second trimester       Information given on gestational diabetes. 1 hour glucose tolerance test today, in addition to CBC and syphilis.   TDAP immunization given.  Blood type A positive. Rhogam not indicated.  Discussed signs and symptoms of  labor.     1. Encounter for supervision of other normal pregnancy in third trimester  Will schedule for US as not performed after last visit. Body habitus makes it difficult to get an accurate measurement.   - Glucose Challenge  1 Hr  (P ), results pending  - TDAP VACCINE (BOOSTRIX)  - CBC with Diff Plt ([facility])  - Syphilis Screen [place] (RPR/VDRL) ([facility])  - US OB > 14 Weeks; Future      Follow up in 4 weeks for routine  prenatal visit.     Dante Nuñez MD  [facility] Resident, PGY2    Precepted with: Patrica Sandoval DO

## 2020-01-03 LAB — TREPONEMA ANTIBODY (SYPHILIS): NEGATIVE

## 2020-01-09 DIAGNOSIS — Z34.83 ENCOUNTER FOR SUPERVISION OF OTHER NORMAL PREGNANCY IN THIRD TRIMESTER: ICD-10-CM

## 2020-01-14 NOTE — PROGRESS NOTES
Preceptor Attestation:   Patient seen, evaluated and discussed with the resident. I have verified the content of the note, which accurately reflects my assessment of the patient and the plan of care.  Supervising Physician:Patrica Sandoval DO [facility]

## 2020-01-29 NOTE — PROGRESS NOTES
History   Ning Giraldo is a 34 year old  female who presents to clinic for a follow up OB visit.   - 32w3d with SRAVANTHI Mar 23, 2020  - No headache, chest pain, shortness of breath, abdominal pain/contractions, vaginal bleeding, or abnormal discharge. has been feeling baby move every day.     Medical and social history and medications reviewed with patient.   Exam   LMP 2019   General: NAD  See flowsheet for abdominal, gyn, extremities, neuro exams  Medical Decision-Making     Patient Active Problem List   Diagnosis     Tobacco dependence     History of snoring     Atypical glandular cells on cervical Pap smear     Morbid obesity (H)     Acquired pes planus of both feet     Encounter for supervision of other normal pregnancy in second trimester     - TDAP given (27+wk) Performed at last visit  - Rhogam not indicated.  - Discussed signs and symptoms of  labor.     Labor/postpartum planning  - Breastfeeding: no  - Contraception: tubal ligation, consent at next visit    Follow up: 2 weeks for routine prenatal visit  Readdress: Nothing specific    Dante Nuñez MD  [facility] Resident, PGY2    Staffed with Dr. Beulah Leon    Options for treatment and follow-up care were reviewed with the patient and/or guardian. Ning Giraldo and/or guardian engaged in the decision making process and verbalized understanding of the options discussed and agreed with the final plan.

## 2020-01-30 ENCOUNTER — OFFICE VISIT (OUTPATIENT)
Dept: FAMILY MEDICINE | Facility: CLINIC | Age: 35
End: 2020-01-30
Payer: COMMERCIAL

## 2020-01-30 VITALS
WEIGHT: 263.2 LBS | SYSTOLIC BLOOD PRESSURE: 138 MMHG | DIASTOLIC BLOOD PRESSURE: 77 MMHG | RESPIRATION RATE: 20 BRPM | OXYGEN SATURATION: 96 % | HEIGHT: 65 IN | BODY MASS INDEX: 43.85 KG/M2 | TEMPERATURE: 97.8 F | HEART RATE: 92 BPM

## 2020-01-30 DIAGNOSIS — Z34.82 ENCOUNTER FOR SUPERVISION OF OTHER NORMAL PREGNANCY IN SECOND TRIMESTER: Primary | ICD-10-CM

## 2020-01-30 ASSESSMENT — MIFFLIN-ST. JEOR: SCORE: 1884.75

## 2020-01-31 NOTE — PROGRESS NOTES
Preceptor Attestation:  Patient's case reviewed and discussed with Dante Nuñez MD resident and I evaluated the patient. I agree with written assessment and plan of care.  Supervising Physician:  SAL CABEZAS MD  [facility]

## 2020-02-07 ENCOUNTER — OFFICE VISIT (OUTPATIENT)
Dept: FAMILY MEDICINE | Facility: CLINIC | Age: 35
End: 2020-02-07
Payer: COMMERCIAL

## 2020-02-07 VITALS
RESPIRATION RATE: 18 BRPM | TEMPERATURE: 98.2 F | HEART RATE: 98 BPM | OXYGEN SATURATION: 97 % | BODY MASS INDEX: 43.99 KG/M2 | SYSTOLIC BLOOD PRESSURE: 131 MMHG | DIASTOLIC BLOOD PRESSURE: 84 MMHG | HEIGHT: 65 IN | WEIGHT: 264 LBS

## 2020-02-07 DIAGNOSIS — Z34.83 ENCOUNTER FOR SUPERVISION OF OTHER NORMAL PREGNANCY IN THIRD TRIMESTER: Primary | ICD-10-CM

## 2020-02-07 ASSESSMENT — MIFFLIN-ST. JEOR: SCORE: 1888.38

## 2020-02-07 NOTE — PROGRESS NOTES
Ning Giraldo is a 34 year old  female who presents to clinic for a follow up OB visit. She is currently 33w4d with an estimated date of delivery of Mar 23, 2020 via LMP. She denies headache, chest pain, shortness of breath, abdominal pain/contractions, vaginal bleeding, or abnormal discharge. She has felt baby move. Feeling kicks in LUQ of belly.     New concerns today: None.     OB History    Para Term  AB Living   5 2 2 0 2 2   SAB TAB Ectopic Multiple Live Births   1 0 0 0 0      # Outcome Date GA Lbr Luisito/2nd Weight Sex Delivery Anes PTL Lv   5 Current            4 SAB 18 11w0d    SAB  N    3 AB            2 Term            1 Term               Obstetric Comments   Blighted ovum at 11weeks confirmed via US and beta-quant in ED. Cassandra MALIK       Physical exam:  LMP 2019     General: alert, female in no acute distress  Abdomen: gravid uterus appropriate for gestation age at 35.5 cm above pubic symphysis, non-tender, FHTs 123  Extremities: no edema    Assessment/Plan:  Patient Active Problem List   Diagnosis     Tobacco dependence     History of snoring     Morbid obesity (H)     Acquired pes planus of both feet     Encounter for supervision of other normal pregnancy in second trimester       Patient does not plan to breastfeed.   Post-partum contraception plans: tubal  Reviewed  care and baby-friendly practices done at hospital after delivery.  TDAP today    Tubal, will refer to OB.     Follow up in 2 weeks for routine prenatal visit.     Dante Nuñez MD  [facility] Resident, PGY2      Precepted with: Patrica Sandoval DO

## 2020-02-10 ENCOUNTER — TELEPHONE (OUTPATIENT)
Dept: FAMILY MEDICINE | Facility: CLINIC | Age: 35
End: 2020-02-10

## 2020-02-10 NOTE — PATIENT INSTRUCTIONS
Referral for :     OB/GYN    LOCATION/PLACE/Provider :    [facility]   DATE & TIME :    referral faxed, location to call  PHONE :     [phone]  FAX :    [phone]  Appointment made by clinic staff/:    Gia

## 2020-02-10 NOTE — TELEPHONE ENCOUNTER
Called [facility] and scheduled patient for TOLAC consult 02/17 at 4:30pm at [facility]. Called patient and left detailed VM advising of day/time and location of appointment. Requested patient call [facility] if needing to reschedule or call clinic. All pertinent episode documentation faxed to [facility] r/t referral. Cassandra MALIK

## 2020-02-18 NOTE — PROGRESS NOTES
"Ning Giraldo is a 34 year old  female who presents to clinic for a follow up OB visit. She is currently 35w4d with an estimated date of delivery of Mar 23, 2020 via LMP. She denies headache, chest pain, shortness of breath, abdominal pain/contractions, vaginal bleeding, or abnormal discharge. Baby has been active.     Referred to OB at last visit for tubal ligation, planning to reschedule after missed appointment on monday.    New concerns today: none    OB History    Para Term  AB Living   5 2 2 0 2 2   SAB TAB Ectopic Multiple Live Births   1 0 0 0 0      # Outcome Date GA Lbr Luisito/2nd Weight Sex Delivery Anes PTL Lv   5 Current            4 SAB 18 11w0d    SAB  N    3 AB            2 Term            1 Term               Obstetric Comments   Blighted ovum at 11weeks confirmed via US and beta-quant in ED. Cassandra MALIK       Physical exam:  /81   Pulse 95   Temp 97.6  F (36.4  C)   Resp 19   Ht 1.651 m (5' 5\")   Wt 120.9 kg (266 lb 9.6 oz)   LMP 2019   SpO2 96%   BMI 44.36 kg/m      General: alert, female in no acute distress  Abdomen: gravid uterus appropriate for gestation age at 35 cm above pubic symphysis, non-tender, FHTs 145  Extremities: no edema    Assessment/Plan:  1. Encounter for supervision of other normal pregnancy in third trimester  2. Multigravida of advanced maternal age in third trimester  Plans for tubal ligation for postpartum contraception, needs to reschedule with OB  Desires formula feeding  TDAP given last visit  Group B strep discussed and culture collected today  Discussed labor pain options, still deciding but considering nitrous oxide  Planning on circumsion    Follow up in 1 week for routine prenatal visit.       Elenita Horner DO  [facility] Resident    Precepted with: Beulah Leon MD      "

## 2020-02-21 ENCOUNTER — OFFICE VISIT (OUTPATIENT)
Dept: FAMILY MEDICINE | Facility: CLINIC | Age: 35
End: 2020-02-21
Payer: COMMERCIAL

## 2020-02-21 VITALS
HEART RATE: 95 BPM | RESPIRATION RATE: 19 BRPM | TEMPERATURE: 97.6 F | HEIGHT: 65 IN | WEIGHT: 266.6 LBS | OXYGEN SATURATION: 96 % | SYSTOLIC BLOOD PRESSURE: 130 MMHG | BODY MASS INDEX: 44.42 KG/M2 | DIASTOLIC BLOOD PRESSURE: 81 MMHG

## 2020-02-21 DIAGNOSIS — O09.523 MULTIGRAVIDA OF ADVANCED MATERNAL AGE IN THIRD TRIMESTER: ICD-10-CM

## 2020-02-21 DIAGNOSIS — Z34.83 ENCOUNTER FOR SUPERVISION OF OTHER NORMAL PREGNANCY IN THIRD TRIMESTER: Primary | ICD-10-CM

## 2020-02-21 ASSESSMENT — MIFFLIN-ST. JEOR: SCORE: 1900.17

## 2020-02-21 NOTE — PATIENT INSTRUCTIONS
[facility] Information  If you have any further concerns or wish to schedule another appointment, please call our office at [phone] during normal business hours (8-5, M-F).     For uncomplicated pregnancies, you will be seeing your doctor once a month until you are 28 weeks, then you will see your doctor twice a month. You will begin weekly visits at 36 weeks until you deliver.     If you have urgent medical questions that cannot wait, you may call [phone] at any time of day.     If you have a medical emergency, please call [phone].    When to call or come in to the hospital  If you notice a decrease in your baby's movement.   If your begin to experience contractions that are 5 minutes or less apart and lasting for over 45 seconds, or if contractions are becoming more painful.  If you have any bleeding or leakage of fluids.   If you have a headache not resolved with tylenol, right upper abdominal pain, or sudden onset of swelling.  You know your body best. Never hesitate to call or go to the hospital if something doesn't feel right!    Preparing for the hospital  You re likely feeling anxious as your child s birth approaches. This is normal. To give yourself some peace of mind, pack a bag 3-4 weeks before your due date. Here is a list of things to remember:  Personal care items like toothbrush, hair brush, lip balm, lotion, shampoo, glasses, contacts  Nightgown, bathrobe, slippers  Several pairs of underwear  Comfortable clothes for you to wear home  Camera with new batteries  Cell phone and   Insurance information and any other paperwork needed for your hospital stay  Clothes for baby to wear home  An infant, rear-facing car seat for bringing home your baby (this is required by law)  What Is Group B Strep?   Group B strep (streptococcus) is a common bacteria. It can grow in a woman s vagina, rectum, or urinary tract. It is almost always harmless in adults. But in rare cases, a woman who has  group B strep can infect her baby during the birth. Infection can cause serious illness in the . The good news is that treating the mother during labor reduces the risk of the baby becoming infected. And if a  is infected, the infection can be treated. You will be screened for Group B strep at 35-36 weeks gestation.  Facts about group B strep   Learning more about group B strep can help you understand how testing and treatment can help. Here are some basic facts about group B strep:   It is not a sexually transmitted disease.   It is not the same as strep throat. (This is caused by group A strep.)   It often has no symptoms and may cause no problems in adults.   Group B strep can be transmitted during vaginal delivery. It cannot be passed during  (surgical) birth.   A mother with group B strep rarely infects her . (Infection occurs only about 1% to 2% of the time.)   When a mother is treated during labor and delivery, her baby almost never becomes infected.   Certain factors during pregnancy increase the risk of a baby becoming infected.  Possible effects on your baby   Group B strep can infect the blood. It can also cause inflammation of the baby s lungs, brain, or spinal cord. Long-term effects can include blindness, deafness, mental retardation, or cerebral palsy. And in rare cases, infection causes death. Infection is most often detected soon after the baby is born.   How your baby may become infected   Group B strep often lives in the vagina or rectum. If the amniotic sac breaks early, bacteria from the vagina can travel to the uterus, reaching the baby. Or, as the baby passes through the birth canal, it can come in contact with the bacteria. In rare cases, group B strep can also be passed to the baby after delivery. This is called late-onset group B strep. The source of this type of infection is not well understood. But some experts believe that it happens if the baby is exposed to  group B strep in the home, from the parents or siblings, or in the community.   What increases the risk?   Certain risk factors increase the chance that a baby will be infected. They include:   Breaking or leaking of the amniotic sac earlier than 37 weeks gestation   Labor earlier than 37 weeks gestation   Breaking of the amniotic sac more than 18 hours before labor begins   Fever during labor   A urinary tract infection with group B strep at any point in the pregnancy   A previous baby born with a group B strep infection    BaBaby Feeding in the Hospital: Information, Support and      Resources    As As you prepare for the birth of your child, you will want to consider options for feeding your baby cluding breast-feeding and/or baby formula. The American Academy of Pediatrics recommends exclusive breast-feeding for the first six months (although any amount of breast-feeding is beneficial).  However, we also understand that breast-feeding is  a personal choice and not for everyone. Whether or not you choose to breast-feed, your decision will be respected by our staff.        There are numerous benefits of breast-feeding; here are a few to consider:    Provides antibodies to protect your baby from infections and diseases    The cost: formula can cost over $1,500 per year    Convenience, no warming up or sterilizing bottles and supplies    The physical contact with breastfeeding can make babies feel secure, warm and comforted        What ever my feeding choice, what can I expect after I deliver my baby?    Your baby will usually be placed skin-to-skin immediately following birth. The skin to skin contact between you and your baby will be a special and memorable time. The bonding and attachment comforts your baby and has a positive effect on baby s brain development.     Having your baby  room in  with you also helps you start to learn your baby s body rhythms and sleep cycle.      You will also begin to learn your  baby s cues (signals) that he or she is ready to feed.        When do I start to feed my baby?         As soon as possible after your baby s birth, you will be encouraged to begin feeding. In the first couple of weeks, your baby will eat often. Breastfeeding babies usually eat at least 8 times in 24 hours. Babies fed formula usually eat at least 7 times in 24 hours.           Breast-feeding tips:    Get comfortable and use pillows for support.    Have your baby at the level of your breast, facing you,  tummy to tummy.       Touch your nipple to your baby s lips so your baby s mouth opens wide (rooting reflex).  Aim the nipple toward the roof of your baby s mouth. When your baby opens his or her mouth, pull your baby toward your breast to help your baby  latch on  to your nipple and much of the areola area.    Hand expressing your breast milk can assist with latching your baby to your breast, if needed.    Ask for help, breastfeeding may seem awkward or uncomfortable at first, this is normal. There are numerous resources available at [facility] hospitals, clinics and beyond.     If your goal is to exclusively breastfeed, avoid using any formula or artificial nipples (including bottles and pacifiers) while you and your baby are learning to breastfeed unless there is a medical reason.       Mixing breastfeeding and formula can interfere with how you begin building your milk supply. It can impact how you and your baby learn to breastfeeding together and alter the natural growth of  good  bacteria in your baby s stomach.  Breast-feeding tips (cont.)    Delay a pacifier or a bottle in the first few weeks until breastfeeding is well established. This is often around 3 weeks of age.    Ask your nurse to show you how to hand express. Breast milk can be kept in the refrigerator orfreezer for later use.     Hospital Resources:  [facility] Lactation Clinics located at [facility], [facility] and [facility]  [facility]  Call: [phone].    Inpatient support    Outpatient appointments    Telephone consultation    Breast-feeding classes available through [employer]      Online Resources:    [email or web address] sign up for free online weekly e-mail    [email or web address]/maternity    Breastfeedingmadesimple.com    [employer].P[employer] Pet (La Leche League)    Normalfed.com    WomenshJ.[facility] Hospitalth.gov/breastfeeding    Workandpump.com    Breast-feeding Supplies & Pumps:  Talk to your insurance provider or WIC (Women, Infants and Children) to learn more about options available to you. Recent health insurance changes may include additional coverage for supplies and pumps.    Public Health:  Women, Infants and Children Nutrition program (WIC): provides breast-feeding support and education in addition to formal feeding moms. 988-NES-4036 or [email or web address]/fh/wic    Family Health Home Visiting: [facility] Nurse home visits are available. Talk to your provider to see if you qualify. Most [facility] have a program available.    Additional Resources:  La Leche League is an international, nonprofit, nonsectarian organization offering information, education, and support to mothers who want to breast-feed their babies. Local groups offer phone help and monthly meetings. Visit [employer].Pit My Pet or [employer].Pit My Pet and us the  Find local support  drop down menu or click on the  Resources  tab.    [place] Breastfeeding Resources: 5-051-591-BABY (2222) toll free    National Breastfeeding Help Line trained breastfeeding peer counselors can help answer common breast-feeding questions by phone. Monday-Friday: English/Sami  5-386- 489-7470 toll free, [phone] (TTY)    Care Connection: [phone] ([phone])

## 2020-02-21 NOTE — PROGRESS NOTES
Preceptor Attestation:  Patient's case reviewed and discussed with Elenita Horner DO resident and I evaluated the patient. I agree with written assessment and plan of care.  Supervising Physician:  SAL CABEZAS MD  [facility]

## 2020-02-22 LAB
ALLERGIC TO PENICILLIN: NO
GP B STREP AG SPEC QL LA: POSITIVE

## 2020-02-27 NOTE — PROGRESS NOTES
"Ning Giraldo is a 34 year old  female who presents to clinic for a follow up OB visit. She is currently 36w4d with an estimated date of delivery of Mar 23, 2020 via LMP. She denies headache, chest pain, shortness of breath, abdominal pain/contractions, vaginal bleeding, or abnormal discharge. Baby has been active.    GBS positive. Tubal ligation has not scheduled with OB yet.     Sore throat for 2-3 days with some cough. No fevers. Eating and drinking normally. Has been drinking warm tea for comfort.     New concerns today: none    OB History    Para Term  AB Living   5 2 2 0 2 2   SAB TAB Ectopic Multiple Live Births   1 0 0 0 0      # Outcome Date GA Lbr Luisito/2nd Weight Sex Delivery Anes PTL Lv   5 Current            4 SAB 18 11w0d    SAB  N    3 AB            2 Term            1 Term               Obstetric Comments   Blighted ovum at 11weeks confirmed via US and beta-quant in ED. Cassandra MALIK       Physical exam:  BP (P) 117/77   Pulse (P) 87   Temp (P) 97.8  F (36.6  C) (Oral)   Resp (P) 18   Ht (P) 1.68 m (5' 6.14\")   Wt (P) 123.4 kg (272 lb)   LMP 2019   SpO2 (P) 97%   BMI (P) 43.71 kg/m      General: alert, female in no acute distress  HEENT: normal TMs without injection or effusion, throat 3+ tonsils with mild erythema, no cervical LN  Abdomen: gravid uterus appropriate for gestation age at 38 cm above pubic symphysis, non-tender, FHTs 135  Extremities: no edema    Assessment/Plan:  1. Encounter for supervision of other normal pregnancy in third trimester  2. Multigravida of advanced maternal age in third trimester  3. Positive GBS test  Plans for tubal ligation for postpartum contraception, still needs to schedule with OB. Reiterated importance of scheduling this.   Desires formula feeding  TDAP previously given  Patient desires delivery at [facility]  Group B strep positive, plan for abx in labor   Discussed labor pain options, still deciding but considering nitrous " oxide  Planning on circumsion    3. Viral URI with cough  Discussed symptomatic measures including warm tea with honey, tylenol for throat discomfort.       Follow up in 1 week for routine prenatal visit.       Elenita Horner DO  [facility] Resident    Precepted with: Dr. Kerns

## 2020-02-28 ENCOUNTER — OFFICE VISIT (OUTPATIENT)
Dept: FAMILY MEDICINE | Facility: CLINIC | Age: 35
End: 2020-02-28
Payer: COMMERCIAL

## 2020-02-28 DIAGNOSIS — O09.523 MULTIGRAVIDA OF ADVANCED MATERNAL AGE IN THIRD TRIMESTER: ICD-10-CM

## 2020-02-28 DIAGNOSIS — J06.9 VIRAL URI WITH COUGH: ICD-10-CM

## 2020-02-28 DIAGNOSIS — B95.1 POSITIVE GBS TEST: ICD-10-CM

## 2020-02-28 DIAGNOSIS — Z34.83 ENCOUNTER FOR SUPERVISION OF OTHER NORMAL PREGNANCY IN THIRD TRIMESTER: Primary | ICD-10-CM

## 2020-02-28 ASSESSMENT — MIFFLIN-ST. JEOR: SCORE: 1942.78

## 2020-02-28 NOTE — PROGRESS NOTES
Preceptor Attestation:   Patient seen and discussed with the resident. Assessment and plan reviewed with resident and agreed upon.   Supervising Physician:  Mic Kerns MD  [facility]

## 2020-02-28 NOTE — PATIENT INSTRUCTIONS
[facility] Information  If you have any further concerns or wish to schedule another appointment, please call our office at [phone] during normal business hours (8-5, M-F).     For uncomplicated pregnancies, you will be seeing your doctor once a month until you are 28 weeks, then you will see your doctor twice a month. You will begin weekly visits at 36 weeks until you deliver.     If you have urgent medical questions that cannot wait, you may call [phone] at any time of day.     If you have a medical emergency, please call [phone].    When to call or come in to the hospital  If you notice a decrease in your baby's movement.   If your begin to experience contractions that are 5 minutes or less apart and lasting for over 45 seconds, or if contractions are becoming more painful.  If you have any bleeding or leakage of fluids.   If you have a headache not resolved with tylenol, right upper abdominal pain, or sudden onset of swelling.  You know your body best. Never hesitate to call or go to the hospital if something doesn't feel right!    Preparing for the hospital  You re likely feeling anxious as your child s birth approaches. This is normal. To give yourself some peace of mind, pack a bag 3-4 weeks before your due date. Here is a list of things to remember:  Personal care items like toothbrush, hair brush, lip balm, lotion, shampoo, glasses, contacts  Nightgown, bathrobe, slippers  Several pairs of underwear  Comfortable clothes for you to wear home  Camera with new batteries  Cell phone and   Insurance information and any other paperwork needed for your hospital stay  Clothes for baby to wear home  An infant, rear-facing car seat for bringing home your baby (this is required by law)  What Is Group B Strep?   Group B strep (streptococcus) is a common bacteria. It can grow in a woman s vagina, rectum, or urinary tract. It is almost always harmless in adults. But in rare cases, a woman who has  group B strep can infect her baby during the birth. Infection can cause serious illness in the . The good news is that treating the mother during labor reduces the risk of the baby becoming infected. And if a  is infected, the infection can be treated. You will be screened for Group B strep at 35-36 weeks gestation.  Facts about group B strep   Learning more about group B strep can help you understand how testing and treatment can help. Here are some basic facts about group B strep:   It is not a sexually transmitted disease.   It is not the same as strep throat. (This is caused by group A strep.)   It often has no symptoms and may cause no problems in adults.   Group B strep can be transmitted during vaginal delivery. It cannot be passed during  (surgical) birth.   A mother with group B strep rarely infects her . (Infection occurs only about 1% to 2% of the time.)   When a mother is treated during labor and delivery, her baby almost never becomes infected.   Certain factors during pregnancy increase the risk of a baby becoming infected.  Possible effects on your baby   Group B strep can infect the blood. It can also cause inflammation of the baby s lungs, brain, or spinal cord. Long-term effects can include blindness, deafness, mental retardation, or cerebral palsy. And in rare cases, infection causes death. Infection is most often detected soon after the baby is born.   How your baby may become infected   Group B strep often lives in the vagina or rectum. If the amniotic sac breaks early, bacteria from the vagina can travel to the uterus, reaching the baby. Or, as the baby passes through the birth canal, it can come in contact with the bacteria. In rare cases, group B strep can also be passed to the baby after delivery. This is called late-onset group B strep. The source of this type of infection is not well understood. But some experts believe that it happens if the baby is exposed to  group B strep in the home, from the parents or siblings, or in the community.   What increases the risk?   Certain risk factors increase the chance that a baby will be infected. They include:   Breaking or leaking of the amniotic sac earlier than 37 weeks gestation   Labor earlier than 37 weeks gestation   Breaking of the amniotic sac more than 18 hours before labor begins   Fever during labor   A urinary tract infection with group B strep at any point in the pregnancy   A previous baby born with a group B strep infection    BaBaby Feeding in the Hospital: Information, Support and      Resources    As As you prepare for the birth of your child, you will want to consider options for feeding your baby cluding breast-feeding and/or baby formula. The American Academy of Pediatrics recommends exclusive breast-feeding for the first six months (although any amount of breast-feeding is beneficial).  However, we also understand that breast-feeding is  a personal choice and not for everyone. Whether or not you choose to breast-feed, your decision will be respected by our staff.        There are numerous benefits of breast-feeding; here are a few to consider:    Provides antibodies to protect your baby from infections and diseases    The cost: formula can cost over $1,500 per year    Convenience, no warming up or sterilizing bottles and supplies    The physical contact with breastfeeding can make babies feel secure, warm and comforted        What ever my feeding choice, what can I expect after I deliver my baby?    Your baby will usually be placed skin-to-skin immediately following birth. The skin to skin contact between you and your baby will be a special and memorable time. The bonding and attachment comforts your baby and has a positive effect on baby s brain development.     Having your baby  room in  with you also helps you start to learn your baby s body rhythms and sleep cycle.      You will also begin to learn your  baby s cues (signals) that he or she is ready to feed.        When do I start to feed my baby?         As soon as possible after your baby s birth, you will be encouraged to begin feeding. In the first couple of weeks, your baby will eat often. Breastfeeding babies usually eat at least 8 times in 24 hours. Babies fed formula usually eat at least 7 times in 24 hours.           Breast-feeding tips:    Get comfortable and use pillows for support.    Have your baby at the level of your breast, facing you,  tummy to tummy.       Touch your nipple to your baby s lips so your baby s mouth opens wide (rooting reflex).  Aim the nipple toward the roof of your baby s mouth. When your baby opens his or her mouth, pull your baby toward your breast to help your baby  latch on  to your nipple and much of the areola area.    Hand expressing your breast milk can assist with latching your baby to your breast, if needed.    Ask for help, breastfeeding may seem awkward or uncomfortable at first, this is normal. There are numerous resources available at [facility] hospitals, clinics and beyond.     If your goal is to exclusively breastfeed, avoid using any formula or artificial nipples (including bottles and pacifiers) while you and your baby are learning to breastfeed unless there is a medical reason.       Mixing breastfeeding and formula can interfere with how you begin building your milk supply. It can impact how you and your baby learn to breastfeeding together and alter the natural growth of  good  bacteria in your baby s stomach.  Breast-feeding tips (cont.)    Delay a pacifier or a bottle in the first few weeks until breastfeeding is well established. This is often around 3 weeks of age.    Ask your nurse to show you how to hand express. Breast milk can be kept in the refrigerator orfreezer for later use.     Hospital Resources:  [facility] Lactation Clinics located at [facility], [facility] and [facility]  [facility]  Call: [phone].    Inpatient support    Outpatient appointments    Telephone consultation    Breast-feeding classes available through [employer]      Online Resources:    [email or web address] sign up for free online weekly e-mail    [facility].[email or web address]/maternity    Breastfeedingmadesimple.com    [employer] (La Leche League)    Normalfed.com    WomenshUC [facility] Hospitalth.gov/breastfeeding    Workandpump.com    Breast-feeding Supplies & Pumps:  Talk to your insurance provider or WIC (Women, Infants and Children) to learn more about options available to you. Recent health insurance changes may include additional coverage for supplies and pumps.    Public Health:  Women, Infants and Children Nutrition program (WIC): provides breast-feeding support and education in addition to formal feeding moms. 332-RZD-7788 or [email or web address].health.[facility].[place]/divs/fh/wic    Family Health Home Visiting: [facility] Nurse home visits are available. Talk to your provider to see if you qualify. Most [facility] have a program available.    Additional Resources:  La Leche League is an international, nonprofit, nonsectarian organization offering information, education, and support to mothers who want to breast-feed their babies. Local groups offer phone help and monthly meetings. Visit VOICEPLATE.COM.[employer] or Infrafone.[employer] and us the  Find local support  drop down menu or click on the  Resources  tab.    [place] Breastfeeding Resources: 6-758-569-BABY (2221) toll free    National Breastfeeding Help Line trained breastfeeding peer counselors can help answer common breast-feeding questions by phone. Monday-Friday: English/Korean  5-669- 242-1932 toll free, [phone] (TTY)    Care Connection: [phone]-[facility] (8806)

## 2020-03-05 NOTE — PROGRESS NOTES
"Ning Giraldo is a 34 year old  female who presents to clinic for a follow up OB visit. She is currently 37w3d with an estimated date of delivery of Mar 23, 2020 via LMP. She denies headache, chest pain, shortness of breath, abdominal pain/contractions, vaginal bleeding, or abnormal discharge. She has felt baby move.     \"Little cold\" the last week or two that is improving    Has not scheduled appointment with OB yet for tubal. Planning to call today after our conversation.    Patient still considering [facility]? On the fence    New concerns today: None    OB History    Para Term  AB Living   5 2 2 0 2 2   SAB TAB Ectopic Multiple Live Births   1 0 0 0 0      # Outcome Date GA Lbr Luisito/2nd Weight Sex Delivery Anes PTL Lv   5 Current            4 SAB 18 11w0d    SAB  N    3 AB            2 Term            1 Term               Obstetric Comments   Blighted ovum at 11weeks confirmed via US and beta-quant in ED. Cassandra MALIK       Physical exam:  /84   Pulse 89   Temp 97.8  F (36.6  C) (Oral)   Resp 16   Ht 1.676 m (5' 6\")   Wt 122.7 kg (270 lb 6.4 oz)   LMP 2019   SpO2 98%   BMI 43.64 kg/m      General: alert, female in no acute distress  Abdomen: gravid uterus appropriate for gestation age at 39 cm above pubic symphysis, non-tender, FHTs 140 bpm, Leopold's maneuver reveals head vertex positioning  Gyn: 1cm/50%/0 station, no discharge  Extremities: no edema    Assessment/Plan:  Patient Active Problem List   Diagnosis     Tobacco dependence     History of snoring     Morbid obesity (H)     Acquired pes planus of both feet     Encounter for supervision of other normal pregnancy in third trimester     Positive GBS test       GBS positive, will need abx.  Reviewed signs/symptoms of labor and when to present to the hospital.    1. Encounter for supervision of other normal pregnancy in third trimester  2. Positive GBS test  3. Excessive weight gain during pregnancy in third " trimester  Otherwise doing well. Will need abx when going into labor. Still has not seen OB/GYN for tubal consult. I fear time has run out to have tubal inpatient, which I explained to patient. Planning for circ. Planning for formula feeding.        Follow up in 1 week for routine prenatal visit, sooner if labor symptoms.     Dante Nuñez MD  [facility] Resident, PGY2    Precepted with: Dr. Adkins

## 2020-03-06 ENCOUNTER — OFFICE VISIT (OUTPATIENT)
Dept: FAMILY MEDICINE | Facility: CLINIC | Age: 35
End: 2020-03-06
Payer: COMMERCIAL

## 2020-03-06 VITALS
SYSTOLIC BLOOD PRESSURE: 117 MMHG | OXYGEN SATURATION: 98 % | DIASTOLIC BLOOD PRESSURE: 84 MMHG | TEMPERATURE: 97.8 F | RESPIRATION RATE: 16 BRPM | BODY MASS INDEX: 43.46 KG/M2 | WEIGHT: 270.4 LBS | HEART RATE: 89 BPM | HEIGHT: 66 IN

## 2020-03-06 DIAGNOSIS — B95.1 POSITIVE GBS TEST: ICD-10-CM

## 2020-03-06 DIAGNOSIS — Z34.83 ENCOUNTER FOR SUPERVISION OF OTHER NORMAL PREGNANCY IN THIRD TRIMESTER: Primary | ICD-10-CM

## 2020-03-06 DIAGNOSIS — O26.03 EXCESSIVE WEIGHT GAIN DURING PREGNANCY IN THIRD TRIMESTER: ICD-10-CM

## 2020-03-06 ASSESSMENT — MIFFLIN-ST. JEOR: SCORE: 1933.28

## 2020-03-06 NOTE — PATIENT INSTRUCTIONS
[facility] Information  Your resident OB Doctor is Dr. German Nuñez    If you have any further concerns or wish to schedule another appointment, please call our office at [phone] during normal business hours (8-5, M-F).     For uncomplicated pregnancies, you will have weekly visits from now until you deliver.     If you have urgent medical questions that cannot wait, you may call [phone] at any time of day.     If you have a medical emergency, please call 911.     When to call or come in to [facility] ([phone] for Labor & Delivery unit)    If you notice a decrease in your baby's movement.     If your begin to experience contractions that are 5 minutes or less apart and lasting for over 45 seconds, or if contractions are becoming more painful.    If you have any bleeding or leakage of fluids.     If you have a headache not resolved with tylenol, right upper abdominal pain, or sudden onset of swelling.    You know your body best. Never hesitate to call or go to the hospital if something doesn't feel right!    Preparing for the hospital  You re likely feeling anxious as your child s birth approaches. This is normal. To give yourself some peace of mind, pack a bag 3-4 weeks before your due date. Here is a list of things to remember:    Personal care items like toothbrush, hair brush, lip balm, lotion, shampoo, glasses, contacts    Nightgown, bathrobe, slippers    Several pairs of underwear    Comfortable clothes for you to wear home    Camera with new batteries    Cell phone and     Insurance information and any other paperwork needed for your hospital stay    Clothes for baby to wear home    An infant, rear-facing car seat for bringing home your baby (this is required by law)

## 2020-03-13 ENCOUNTER — OFFICE VISIT (OUTPATIENT)
Dept: FAMILY MEDICINE | Facility: CLINIC | Age: 35
End: 2020-03-13
Payer: COMMERCIAL

## 2020-03-13 VITALS
RESPIRATION RATE: 22 BRPM | HEART RATE: 74 BPM | HEIGHT: 66 IN | TEMPERATURE: 97.7 F | SYSTOLIC BLOOD PRESSURE: 130 MMHG | OXYGEN SATURATION: 99 % | BODY MASS INDEX: 44.07 KG/M2 | WEIGHT: 274.2 LBS | DIASTOLIC BLOOD PRESSURE: 88 MMHG

## 2020-03-13 DIAGNOSIS — O26.03 EXCESSIVE WEIGHT GAIN DURING PREGNANCY IN THIRD TRIMESTER: ICD-10-CM

## 2020-03-13 DIAGNOSIS — Z34.83 ENCOUNTER FOR SUPERVISION OF OTHER NORMAL PREGNANCY IN THIRD TRIMESTER: Primary | ICD-10-CM

## 2020-03-13 DIAGNOSIS — B95.1 POSITIVE GBS TEST: ICD-10-CM

## 2020-03-13 ASSESSMENT — MIFFLIN-ST. JEOR: SCORE: 1950.51

## 2020-03-13 NOTE — PROGRESS NOTES
"SUBJECTIVE  No vaginal bleeding, loss of fluids, or cramping noted. No headaches, change in vision, RUQ pain. Mild lower extremity edema.     Usually close to 40 weeks with other children when went into spontaneous labor via SROM.     OBJECTIVE  /88   Pulse 74   Temp 97.7  F (36.5  C)   Resp 22   Ht 1.676 m (5' 6\")   Wt 124.4 kg (274 lb 3.2 oz)   LMP 2019   SpO2 99%   BMI 44.26 kg/m      General: sitting up in a chair awake and alert. Pleasant and cooperative in NAD.  CV: acyanotic   Pulm: breathing comfortably, no increased WOB  Abd: gravid, non-tender, uterus palpated 39 cm, FHTs 125, vertex on bedside US,   Extremities: soft, nontender    ASSESSMENT/PLAN  Patient is a  a 34 year old  female at 38w4d corresponding to SRAVANTHI of  Mar 23, 2020 obtained via LMP consistent with 12wk US. Planning on vaginal delivery.  - Pregnancy complications: excess weight gain (39 lbs), GBS positive (will need abx in labor)  - Breast but primarily formula feeding  - Wanted tubal ligation, but consent not signed 30 days prior so considering patch  - Planning on circumcision  - Next visit: 1 week  -----------------------------------------    Precepted with: Dr. Hakeem Horner,   [facility] Resident  Pager #[phone]      "

## 2020-03-13 NOTE — PROGRESS NOTES
Preceptor Attestation:   Patient seen, evaluated and discussed with the resident. I have verified the content of the note, which accurately reflects my assessment of the patient and the plan of care.  Supervising Physician:Hanna Palacio MD  [facility]

## 2020-03-23 ENCOUNTER — OFFICE VISIT (OUTPATIENT)
Dept: FAMILY MEDICINE | Facility: CLINIC | Age: 35
End: 2020-03-23
Payer: COMMERCIAL

## 2020-03-23 VITALS
TEMPERATURE: 97.4 F | BODY MASS INDEX: 44.45 KG/M2 | OXYGEN SATURATION: 99 % | HEIGHT: 66 IN | RESPIRATION RATE: 18 BRPM | DIASTOLIC BLOOD PRESSURE: 76 MMHG | HEART RATE: 78 BPM | SYSTOLIC BLOOD PRESSURE: 130 MMHG | WEIGHT: 276.6 LBS

## 2020-03-23 DIAGNOSIS — Z34.83 ENCOUNTER FOR SUPERVISION OF OTHER NORMAL PREGNANCY IN THIRD TRIMESTER: Primary | ICD-10-CM

## 2020-03-23 DIAGNOSIS — B95.1 POSITIVE GBS TEST: ICD-10-CM

## 2020-03-23 DIAGNOSIS — O26.03 EXCESSIVE WEIGHT GAIN DURING PREGNANCY IN THIRD TRIMESTER: ICD-10-CM

## 2020-03-23 ASSESSMENT — MIFFLIN-ST. JEOR: SCORE: 1961.4

## 2020-03-23 NOTE — PATIENT INSTRUCTIONS
[facility] Information  Your resident OB Doctor is Dr. German Nuñez    If you have any further concerns or wish to schedule another appointment, please call our office at [phone] during normal business hours (8-5, M-F).     For uncomplicated pregnancies, you will have weekly visits from now until you deliver.     If you have urgent medical questions that cannot wait, you may call [phone] at any time of day.     If you have a medical emergency, please call 911.     When to call or come in to [facility] ([phone] for Labor & Delivery unit)    If you notice a decrease in your baby's movement.     If your begin to experience contractions that are 5 minutes or less apart and lasting for over 45 seconds, or if contractions are becoming more painful.    If you have any bleeding or leakage of fluids.     If you have a headache not resolved with tylenol, right upper abdominal pain, or sudden onset of swelling.    You know your body best. Never hesitate to call or go to the hospital if something doesn't feel right!    Preparing for the hospital  You re likely feeling anxious as your child s birth approaches. This is normal. To give yourself some peace of mind, pack a bag 3-4 weeks before your due date. Here is a list of things to remember:    Personal care items like toothbrush, hair brush, lip balm, lotion, shampoo, glasses, contacts    Nightgown, bathrobe, slippers    Several pairs of underwear    Comfortable clothes for you to wear home    Camera with new batteries    Cell phone and     Insurance information and any other paperwork needed for your hospital stay    Clothes for baby to wear home    An infant, rear-facing car seat for bringing home your baby (this is required by law)

## 2020-03-23 NOTE — PROGRESS NOTES
Ning Giraldo is a 34 year old  female who presents to clinic for a follow up OB visit. She is currently 40w0d with an estimated date of delivery of Mar 23, 2020 via LMP. She denies headache, chest pain, shortness of breath, abdominal pain/contractions, or abnormal discharge. She has felt baby move.     Does note two small specks of blood, last one was 4-5 days ago, while wiping.     New concerns today: None    OB History    Para Term  AB Living   5 2 2 0 2 2   SAB TAB Ectopic Multiple Live Births   1 0 0 0 0      # Outcome Date GA Lbr Luisito/2nd Weight Sex Delivery Anes PTL Lv   5 Current            4 SAB / 11w0d    SAB  N    3 AB            2 Term            1 Term               Obstetric Comments   Blighted ovum at 11weeks confirmed via US and beta-quant in ED. Cassandra MALIK       Physical exam:  LMP 2019     General: alert, female in no acute distress  Abdomen: gravid uterus appropriate for gestation age at 41 cm above pubic symphysis, non-tender, FHTs 132, Leopold's maneuver reveals vertex positioning  Gyn: 1cm/50%/-1, no discharge  Extremities: no edema    Assessment/Plan:  Patient Active Problem List   Diagnosis     Tobacco dependence     History of snoring     Morbid obesity (H)     Acquired pes planus of both feet     Encounter for supervision of other normal pregnancy in third trimester     Positive GBS test     Excessive weight gain during pregnancy in third trimester     1. Encounter for supervision of other normal pregnancy in third trimester  2. Positive GBS test  3. Excessive weight gain during pregnancy in third trimester  GBS Positive, will need abx when in labor. Thinking baby may be on the bigger side. Will call in to see if we can schedule for induction tomorrow.   Reviewed signs/symptoms of labor and when to present to the hospital.    Will need tubal after delivery, but not in hospital.  Would like circumcision    Dante Nuñez MD  [facility]  Resident, PGY2    Precepted with: Dr. Palacio

## 2020-04-01 ENCOUNTER — OFFICE VISIT (OUTPATIENT)
Dept: FAMILY MEDICINE | Facility: CLINIC | Age: 35
End: 2020-04-01
Payer: COMMERCIAL

## 2020-04-01 VITALS
HEART RATE: 74 BPM | DIASTOLIC BLOOD PRESSURE: 75 MMHG | TEMPERATURE: 97.9 F | BODY MASS INDEX: 40.56 KG/M2 | WEIGHT: 252.4 LBS | SYSTOLIC BLOOD PRESSURE: 130 MMHG | HEIGHT: 66 IN | RESPIRATION RATE: 18 BRPM | OXYGEN SATURATION: 94 %

## 2020-04-01 DIAGNOSIS — Z30.09 BIRTH CONTROL COUNSELING: ICD-10-CM

## 2020-04-01 RX ORDER — LABETALOL 200 MG/1
200 TABLET, FILM COATED ORAL 2 TIMES DAILY
COMMUNITY
Start: 2020-03-28 | End: 2020-04-22

## 2020-04-01 ASSESSMENT — MIFFLIN-ST. JEOR: SCORE: 1851.63

## 2020-04-01 NOTE — PROGRESS NOTES
HPI:       Ning Giraldo is a 34 year old  female who presents for follow up of concern(s) listed below    7 days postpartum from .   -Developed preeclampsia prior to delivery, and had preeclampsia with severe features postpartum, solely based on systolic BP>160  -No other lab abnormalities  -She was started on labetalol 200 mg BID prior to discharge; taking it twice per day  -Headache? Occasional, light headache  -SOB: No  -Blurry vision? No  -RUQ pain? No  -Frothy urine? No  -Normal lochia    Birth control:  -Has been on the patch in the past  -Has been on the patch in the past when over the recommended weight limit  -Not back to having sexual intercourse  -Has breast fed some, and doing some pumping, but mostly formula feeding  -Weary of progesterone because it has caused weight gain in the past           PMHX:     Patient Active Problem List   Diagnosis     Tobacco dependence     History of snoring     Morbid obesity (H)     Acquired pes planus of both feet     Encounter for supervision of other normal pregnancy in third trimester     Positive GBS test     Excessive weight gain during pregnancy in third trimester     Pre-eclampsia, severe, delivered with postpartum condition       Current Outpatient Medications   Medication Sig Dispense Refill     Prenatal Vit-Fe Fumarate-FA (PRENATAL MULTIVITAMIN W/IRON) 27-0.8 MG tablet Take 1 tablet by mouth daily 90 tablet 3       Social History     Socioeconomic History     Marital status: Single     Spouse name: Not on file     Number of children: Not on file     Years of education: Not on file     Highest education level: Not on file   Occupational History     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   Social Needs     Financial resource strain: Not on file     Food insecurity     Worry: Not on file     Inability: Not on file     Transportation needs     Medical: Not on file     Non-medical: Not on file   Tobacco Use     Smoking status: Former Smoker  "    Types: Cigarettes     Smokeless tobacco: Never Used     Tobacco comment: 1/2 pack/day   Substance and Sexual Activity     Alcohol use: No     Alcohol/week: 2.0 standard drinks     Types: 2 Standard drinks or equivalent per week     Drug use: No     Sexual activity: Yes     Partners: Male     Birth control/protection: Patch   Lifestyle     Physical activity     Days per week: Not on file     Minutes per session: Not on file     Stress: Not on file   Relationships     Social connections     Talks on phone: Not on file     Gets together: Not on file     Attends Nondenominational service: Not on file     Active member of club or organization: Not on file     Attends meetings of clubs or organizations: Not on file     Relationship status: Not on file     Intimate partner violence     Fear of current or ex partner: Not on file     Emotionally abused: Not on file     Physically abused: Not on file     Forced sexual activity: Not on file   Other Topics Concern     Not on file   Social History Narrative    Children: Jayesh Stevo 6yo, Tyrses Stevo 15yo          Allergies   Allergen Reactions     No Known Allergies        No results found for this or any previous visit (from the past 24 hour(s)).         Review of Systems:     A 10 point review of systems including constitutional, cardiovascular, respiratory, HEENT, GI, , neurological, MSK, skin, endocrine, is negative unless stated in HPI          Physical Exam:     Vitals:    04/01/20 1442   BP: 130/75   Pulse: 74   Resp: 18   Temp: 97.9  F (36.6  C)   SpO2: 94%   Weight: 114.5 kg (252 lb 6.4 oz)   Height: 1.676 m (5' 6\")     Body mass index is 40.74 kg/m .    GENERAL APPEARANCE: healthy, alert and no distress  MS: extremities normal- no gross deformities noted  SKIN: no suspicious lesions or rashes  PSYCH: mood and affect normal/bright      Assessment and Plan     1. Pre-eclampsia, severe, delivered with postpartum condition  2. Routine postpartum follow-up  3. Normal " spontaneous vaginal delivery  Pressures good here today. Continue labetalol 200 mg BID. Phone visit next week.   - order for DME; Equipment being ordered: Digital home blood pressure monitor kit  Dispense: 1 Device; Refill: 0    4. Birth control counseling  Difficult choice. Weight and hx smoking relatively contraindicated for the patch. She has stopped smoking though, and has been on patch with weight >200 lbs, so would be fine likely, and we just need to get her to the point when she can get her tubal.      Options for treatment and follow-up care were reviewed with the patient and/or guardian. Ning Brodyr and/or guardian engaged in the decision making process and verbalized understanding of the options discussed and agreed with the final plan.    Dante Nuñez MD  [facility] Resident, PGY2      Precepted today with: Dr. Otoole

## 2020-04-03 NOTE — PROGRESS NOTES
I have personally reviewed the history and examination as documented by Dr. Nuñez.  I was present during key portions of the visit and agree with the assessment and plan as documented for 34 yr old female with hx of severe preclampsia here for post-partum visit. BP stable in office. Cont Labetalol. Lengthy discussion regarding contraceptive options as well. Precautions given. Anticipatory guidance given.     Primitivo Otoole MD  April 3, 2020  8:48 AM

## 2020-04-07 ENCOUNTER — VIRTUAL VISIT (OUTPATIENT)
Dept: FAMILY MEDICINE | Facility: CLINIC | Age: 35
End: 2020-04-07
Payer: COMMERCIAL

## 2020-04-07 VITALS — WEIGHT: 252 LBS | BODY MASS INDEX: 41.99 KG/M2 | HEIGHT: 65 IN

## 2020-04-07 DIAGNOSIS — Z30.09 BIRTH CONTROL COUNSELING: ICD-10-CM

## 2020-04-07 ASSESSMENT — MIFFLIN-ST. JEOR: SCORE: 1833.94

## 2020-04-07 NOTE — PROGRESS NOTES
"Family Medicine Telephone Visit Note           Telephone Visit Consent   Patient was verbally read the following and verbal consent was obtained.  \"I understand that I may revoke this request for a phone visit at any time.  This consent will automatically  3 months from the signed date and time.\"    Name person giving consent:  Patient   Date verbal consent given:  2020  Time verbal consent given:  9:58 AM          No chief complaint on file.    Current Outpatient Medications   Medication Sig Dispense Refill     labetalol (NORMODYNE) 200 MG tablet Take 200 mg by mouth 2 times daily       order for DME Equipment being ordered: Digital home blood pressure monitor kit 1 Device 0     Prenatal Vit-Fe Fumarate-FA (PRENATAL MULTIVITAMIN W/IRON) 27-0.8 MG tablet Take 1 tablet by mouth daily 90 tablet 3     Allergies   Allergen Reactions     No Known Allergies                    HPI   Patients name: Ning  Appointment start time:  1003 AM    Preeclampsia:  -Denies headache, SOB, blurry vision  -Does endorse a one-time episode of right-sided abdominal cramping that dissipated quickly. She thinks this was related to something she ate.     Birth control  -Still breastfeeding  -Does not plan on having intercourse for 2-3 more weeks  -Will call to OB today to see what tubal ligation status is        Assessment and Plan   1. Pre-eclampsia, severe, delivered with postpartum condition  Doing well on labetalol. No symptoms of preeclampsia. Only having odd taste sensation with labetalol, which we will monitor. Will have her  BP cuff today and have schedulers call her tomorrow to get a BP, and then schedule phone visit for one week.    2. Birth control counseling  Sort of in limbo due to COVID-19. Wants tubal, but can't right now due to virus. Not smoking, so theoretically, could try patch again, but is still breastfeeding. Assures me no sexual intercourse for next 2-3 weeks, but we need a formal plan.      Refilled " medications that would be required in the next 3 months.     After Visit Information:  Will print and mail AVS     Appointment end time: 10:15 AM  This is a telephone visit that took 11 minutes.      Clinician location:  [facility]    ROSALIE Medina MD  [facility] Resident, PGY2

## 2020-04-10 NOTE — PROGRESS NOTES
Preceptor Attestation:  I discussed the patient with the resident. I have verified the content of the note, which accurately reflects my assessment of the patient and the plan of care.   Supervising Physician:  Albina Velasquez MD.

## 2020-04-23 RX ORDER — LABETALOL 200 MG/1
200 TABLET, FILM COATED ORAL 2 TIMES DAILY
Qty: 30 TABLET | Refills: 0 | Status: SHIPPED | OUTPATIENT
Start: 2020-04-23 | End: 2020-07-15

## 2020-05-06 ENCOUNTER — OFFICE VISIT (OUTPATIENT)
Dept: FAMILY MEDICINE | Facility: CLINIC | Age: 35
End: 2020-05-06
Payer: COMMERCIAL

## 2020-05-06 VITALS
OXYGEN SATURATION: 99 % | WEIGHT: 251.6 LBS | BODY MASS INDEX: 40.43 KG/M2 | HEART RATE: 74 BPM | DIASTOLIC BLOOD PRESSURE: 82 MMHG | TEMPERATURE: 97.9 F | RESPIRATION RATE: 20 BRPM | HEIGHT: 66 IN | SYSTOLIC BLOOD PRESSURE: 128 MMHG

## 2020-05-06 DIAGNOSIS — Z30.09 BIRTH CONTROL COUNSELING: ICD-10-CM

## 2020-05-06 DIAGNOSIS — Z87.891 PERSONAL HISTORY OF TOBACCO USE: ICD-10-CM

## 2020-05-06 PROBLEM — Z34.83 ENCOUNTER FOR SUPERVISION OF OTHER NORMAL PREGNANCY IN THIRD TRIMESTER: Status: RESOLVED | Noted: 2019-09-30 | Resolved: 2020-05-06

## 2020-05-06 ASSESSMENT — MIFFLIN-ST. JEOR: SCORE: 1848

## 2020-05-06 NOTE — PROGRESS NOTES
I have personally reviewed the history and examination as documented by Dr. Nuñez.  I was present during key portions of the visit and agree with the assessment and plan as documented for 34 yr old female w/ hx of preeclampsia here for post-partum check/ BP check. Has been on labetalol. Will discontinue and monitor closely. Precautions given. Anticipatory guidance given.     Primitivo Otoole MD  May 6, 2020  4:49 PM

## 2020-05-06 NOTE — PROGRESS NOTES
HPI:       Ning Giraldo is a 34 year old  female who presents for follow up of concern(s) listed below    Postpartum visit:  -Developed preeclampsia with severe features postpartum, and was started on labetalol 200 mg BID thereafter  -Has been taking this medication  -Pressures have not been able to be followed up due to inability to obtain BP cuff  -Otherwise patient is doing quite well, no complaints today    Birth control:  -Also have been trying to figure out contraception  -Has not done well with nexplanon in past, and weary of progesterone as it has caused weight gain  -Interested in doing the patch again  -Back to having sex now, started on the patch last Tuesday  -Not smoking anymore  -Still breastfeeding about 10% of the time           PMHX:     Patient Active Problem List   Diagnosis     Tobacco dependence     History of snoring     Morbid obesity (H)     Acquired pes planus of both feet     Encounter for supervision of other normal pregnancy in third trimester     Positive GBS test     Excessive weight gain during pregnancy in third trimester     Pre-eclampsia, severe, delivered with postpartum condition     Normal spontaneous vaginal delivery       Current Outpatient Medications   Medication Sig Dispense Refill     labetalol (NORMODYNE) 200 MG tablet Take 1 tablet (200 mg) by mouth 2 times daily 30 tablet 0     Prenatal Vit-Fe Fumarate-FA (PRENATAL MULTIVITAMIN W/IRON) 27-0.8 MG tablet Take 1 tablet by mouth daily 90 tablet 3     order for DME Equipment being ordered: Digital home blood pressure monitor kit 1 Device 0       Social History     Socioeconomic History     Marital status: Single     Spouse name: Not on file     Number of children: Not on file     Years of education: Not on file     Highest education level: Not on file   Occupational History     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   Social Needs     Financial resource strain: Not on file     Food insecurity     Worry: Not  "on file     Inability: Not on file     Transportation needs     Medical: Not on file     Non-medical: Not on file   Tobacco Use     Smoking status: Former Smoker     Types: Cigarettes     Smokeless tobacco: Never Used     Tobacco comment: 1/2 pack/day   Substance and Sexual Activity     Alcohol use: No     Alcohol/week: 2.0 standard drinks     Types: 2 Standard drinks or equivalent per week     Drug use: No     Sexual activity: Yes     Partners: Male     Birth control/protection: Patch   Lifestyle     Physical activity     Days per week: Not on file     Minutes per session: Not on file     Stress: Not on file   Relationships     Social connections     Talks on phone: Not on file     Gets together: Not on file     Attends [employer] service: Not on file     Active member of club or organization: Not on file     Attends meetings of clubs or organizations: Not on file     Relationship status: Not on file     Intimate partner violence     Fear of current or ex partner: Not on file     Emotionally abused: Not on file     Physically abused: Not on file     Forced sexual activity: Not on file   Other Topics Concern     Not on file   Social History Narrative    Children: Jayesh Stevo 8yo, Tyrses Stevo 13yo          Allergies   Allergen Reactions     No Known Allergies        No results found for this or any previous visit (from the past 24 hour(s)).         Review of Systems:     A 10 point review of systems including constitutional, cardiovascular, respiratory, HEENT, GI, , neurological, MSK, skin, endocrine, is negative unless stated in HPI          Physical Exam:     Vitals:    05/06/20 1330   BP: 128/82   Pulse: 74   Resp: 20   Temp: 97.9  F (36.6  C)   SpO2: 99%   Weight: 114.1 kg (251 lb 9.6 oz)   Height: 1.676 m (5' 6\")     Body mass index is 40.61 kg/m .    GENERAL APPEARANCE: healthy, alert and no distress, no acute distress  MS: extremities normal- no gross deformities noted  SKIN: no suspicious lesions or " rashes  NEURO: Normal strength and tone, sensory exam grossly normal, mentation appears intact and speech normal  PSYCH: mood and affect normal/bright      Assessment and Plan     1. Pre-eclampsia, severe, delivered with postpartum condition  Pressures have been good at home with her BP cuff. Highest she has seen is 133 systolic. Will stop labetalol as she is out of the window that would be concerning for preeclampsia. If pressures elevated at our telephone visit next week, then can treat her for HTN.     2. Personal history of tobacco use  Has now quit. Congratulated her on this.     3. Normal spontaneous vaginal delivery  Doing well, no complaints.     4. Birth control counseling  Discussed that given her weight, patch may not be 100% effective, which she is aware of, and we have discussed before. Has tried other options and nothing else has been ideal for her. Discussed barrier contraception in addition, patient not open to this.     Options for treatment and follow-up care were reviewed with the patient and/or guardian. Ning Giraldo and/or guardian engaged in the decision making process and verbalized understanding of the options discussed and agreed with the final plan. Floated the idea of vasectomy for her partner, and they will discuss.     Dante Nuñez MD  [facility] Resident, PGY2      Precepted today with: Primitivo Otoole MD

## 2020-05-13 ENCOUNTER — VIRTUAL VISIT (OUTPATIENT)
Dept: FAMILY MEDICINE | Facility: CLINIC | Age: 35
End: 2020-05-13
Payer: COMMERCIAL

## 2020-05-13 VITALS — HEART RATE: 85 BPM | DIASTOLIC BLOOD PRESSURE: 90 MMHG | SYSTOLIC BLOOD PRESSURE: 140 MMHG

## 2020-05-13 NOTE — NURSING NOTE
Chief Complaint   Patient presents with     Hypertension     follow up.     Medication Reconciliation     completed.        BP (!) 140/90   Pulse 85   LMP 06/17/2019   Breastfeeding Unknown       ~ Danilo Buckley CMA ([place])  [facility]  Phone: [phone]

## 2020-05-13 NOTE — PROGRESS NOTES
"Family Medicine Telephone Visit Note               Telephone Visit Consent   Patient was verbally read the following and verbal consent was obtained.    \"Telephone visits are billed at different rates depending on your insurance coverage. During this emergency period, for some insurers they may be billed the same as an in-person visit.  Please reach out to your insurance provider with any questions.  If during the course of the call the physician/provider feels a telephone visit is not appropriate, you will not be charged for this service.\"    Name person giving consent:  Patient   Date verbal consent given:  5/13/2020  Time verbal consent given:  1:33 PM       Chief Complaint   Patient presents with     Hypertension     follow up.     Medication Reconciliation     completed.        BP (!) 140/90   Pulse 85   LMP 06/17/2019   Breastfeeding Unknown       ~ Danilo Buckley CMA ([place])  [facility]  Phone: [phone]           HPI   Patients name: Ning  Appointment start time:  157 PM    HTN:  -Checking pressures at home daily, sees 120s/80s  -No symptoms of CP, SOB, headaches  -High today but had just been moving around    Current Outpatient Medications   Medication Sig Dispense Refill     labetalol (NORMODYNE) 200 MG tablet Take 1 tablet (200 mg) by mouth 2 times daily 30 tablet 0     order for DME Equipment being ordered: Digital home blood pressure monitor kit 1 Device 0     Prenatal Vit-Fe Fumarate-FA (PRENATAL MULTIVITAMIN W/IRON) 27-0.8 MG tablet Take 1 tablet by mouth daily 90 tablet 3     Allergies   Allergen Reactions     No Known Allergies               Review of Systems:     A 10 point review of systems including constitutional, cardiovascular, respiratory, HEENT, GI, , neurological, MSK, skin, endocrine, is negative unless stated in HPI         Physical Exam:     LMP 06/17/2019   Estimated body mass index is 40.61 kg/m  as calculated from the following:    Height as of " "5/6/20: 1.676 m (5' 6\").    Weight as of 5/6/20: 114.1 kg (251 lb 9.6 oz).    Exam:  Constitutional: healthy, alert and no distress  Psychiatric: mentation appears normal and affect normal/bright          Assessment and Plan   1. Pre-eclampsia, severe, delivered with postpartum condition  Resolved. Not HTN now. Do not continue med. Check pressures once weekly.     Refilled medications that would be required in the next 3 months.     After Visit Information:  Will print and mail AVS     No follow-ups on file.    Appointment end time: 202 PM  This is a telephone visit that took 5 minutes.      Clinician location:  [facility] Joseph Renier, MD  I precepted today with Dr. Velasquez.        "

## 2020-07-15 ENCOUNTER — VIRTUAL VISIT (OUTPATIENT)
Dept: FAMILY MEDICINE | Facility: CLINIC | Age: 35
End: 2020-07-15
Payer: COMMERCIAL

## 2020-07-15 VITALS — SYSTOLIC BLOOD PRESSURE: 200 MMHG | DIASTOLIC BLOOD PRESSURE: 90 MMHG

## 2020-07-15 DIAGNOSIS — Z20.822 EXPOSURE TO COVID-19 VIRUS: Primary | ICD-10-CM

## 2020-07-15 DIAGNOSIS — F43.21 ADJUSTMENT DISORDER WITH DEPRESSED MOOD: ICD-10-CM

## 2020-07-15 RX ORDER — LABETALOL 100 MG/1
100 TABLET, FILM COATED ORAL 2 TIMES DAILY
Qty: 60 TABLET | Refills: 1 | Status: SHIPPED | OUTPATIENT
Start: 2020-07-15 | End: 2020-11-17

## 2020-07-15 RX ORDER — NORELGESTROMIN AND ETHINYL ESTRADIOL 150; 35 UG/D; UG/D
1 PATCH TRANSDERMAL WEEKLY
COMMUNITY
Start: 2020-07-08 | End: 2021-04-12

## 2020-07-15 ASSESSMENT — PATIENT HEALTH QUESTIONNAIRE - PHQ9: SUM OF ALL RESPONSES TO PHQ QUESTIONS 1-9: 13

## 2020-07-15 NOTE — PROGRESS NOTES
"Family Medicine Telephone Visit Note           Telephone Visit Consent   Patient was verbally read the following and verbal consent was obtained.    \"Telephone visits are billed at different rates depending on your insurance coverage. During this emergency period, for some insurers they may be billed the same as an in-person visit.  Please reach out to your insurance provider with any questions.  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     She denies fever. She has occasional cough from swallowing issue- long term issue.   No stuffy nose, no change in sense of smell.   No vomiting or diarrhea.   She has some chronic nausea, nothing new.   No rash.     Shifts canceled for next week, needs negative covid test.     Has a 3 month old at home. Had postpartum preeclampsia for which she was seeing Dr. Nuñez.     /90 today first check.   Hasn't been checking much in the past 2-3 weeks at least.       161/96 on recheck while I'm talking with her.  She was taken off labetalol last appt (mid May). Hasn't checked home BPs since then.     Reports right flank pain for a couple days. Feels like a tightness.   Has had this pain in the past. It's been occurring on and off throughout several years.  She wonders if it has to do with keeping her patch on all the time. (She switches patch out 1x per week but she hasn't been doing the break week " "because she wanted to avoid menses).     No headache, no change in vision, no swelling. Reports she was very swollen when she came home from the hospital and has not seen anything like that since it resolved in the first week or two postpartum.       PHQ 7/15/2020   PHQ-9 Total Score 13   Q9: Thoughts of better off dead/self-harm past 2 weeks Not at all     Mood has been down lately from everything going on in the world. Her older son has a  and so she's been talking with the  and has sought out resources from her. No thoughts of self harm.       Current Outpatient Medications   Medication Sig Dispense Refill     labetalol (NORMODYNE) 200 MG tablet Take 1 tablet (200 mg) by mouth 2 times daily (Patient not taking: Reported on 5/13/2020) 30 tablet 0     order for DME Equipment being ordered: Digital home blood pressure monitor kit (Patient not taking: Reported on 7/15/2020) 1 Device 0     Prenatal Vit-Fe Fumarate-FA (PRENATAL MULTIVITAMIN W/IRON) 27-0.8 MG tablet Take 1 tablet by mouth daily (Patient not taking: Reported on 5/13/2020) 90 tablet 3     Allergies   Allergen Reactions     No Known Allergies               Review of Systems:     Constitutional, HEENT, cardiovascular, pulmonary, gi and gu systems are negative, except as otherwise noted.         Physical Exam:     BP (!) 200/90   LMP  (LMP Unknown)   Estimated body mass index is 40.61 kg/m  as calculated from the following:    Height as of 5/6/20: 1.676 m (5' 6\").    Weight as of 5/6/20: 114.1 kg (251 lb 9.6 oz).    Exam:  Constitutional: healthy, alert and no distress  Psychiatric: mentation appears normal and affect normal/bright          Assessment and Plan     1. History of recent postpartum preeclampsia  BP was normal when checked 2 months ago and patient was taken off labetalol at that time but hasn't really been checking home BPs since then, 2 measurements are high today. No red flag symptoms to suggest we need to get HELLP " labs today. Her flank pain is chronic. Discussed with her that we should still pick an antihypertensive that's safe in pregnancy because she is of child bearing age and patches not perfect. Could consider switching birth controls since combination estrogen/progesterone could be contributing to hypertension. Follow up in 2 weeks for BP check.   - labetalol (NORMODYNE) 100 MG tablet; Take 1 tablet (100 mg) by mouth 2 times daily  Dispense: 60 tablet; Refill: 1    2. Exposure to COVID-19 virus  Exposure was over 1 week ago so reasonable to get covid test now. She is asymptomatic, prefers to get testing done at [facility].   - COVID-19 Virus PCR [facility] ([facility])    3. Adjustment disorder with depressed mood  Sounds to me like patient's situation is leading to depressed mood (covid + new baby + working at a job with covid risk + not being able to do normal things during pandemic). She is not having thoughts of self harm. She has been seeking out resources on her own which is great. Open to speaking with a therapist and so I placed mental health referral today. Certainly we are still in a high risk period being 3 months postpartum.   - [facility] M[facility] REFERRAL  -    Refilled medications that would be required in the next 3 months.     After Visit Information:  Not needed    No follow-ups on file.    Appointment end time: 11:35 AM  This is a telephone visit that took 23 minutes.      Clinician location:  [facility]     Hanna Palacio MD

## 2020-07-16 DIAGNOSIS — Z20.822 SUSPECTED COVID-19 VIRUS INFECTION: Primary | ICD-10-CM

## 2020-07-16 DIAGNOSIS — Z20.822 EXPOSURE TO COVID-19 VIRUS: ICD-10-CM

## 2020-07-16 LAB
COVID-19 VIRUS PCR TO U OF MN - SOURCE: NORMAL
SARS-COV-2 RNA SPEC QL NAA+PROBE: NOT DETECTED

## 2020-07-16 NOTE — PATIENT INSTRUCTIONS
Instructions for Patients ([facility])  Patient Education   After Your COVID-19 (Coronavirus) Test  You have been tested for COVID-19 (coronavirus).   If you'll have surgery in the next few days, we'll let you know ahead of time if you have the virus. Please call your surgeon's office with any questions.  For all other patients: Results are usually available within 2 to 10 days.    If your test result is positive, you'll get a phone call letting you know. (A positive test means that you have the virus.)    If your test result is negative, you'll get a letter in the mail. (A negative test suggests you do not have the virus.) If you use [employer], you'll get a message from [employer] when your result is ready.  After 7 to 10 days, if you have not gotten your results:     Call [phone] and ask to speak with our COVID-19 results team.    If you're being treated at an infusion center: Call your infusion center directly.  What are the symptoms of COVID-19?  Symptoms may include any of the following: Fever, cough, trouble breathing, headache, body aches, sore throat, runny or stuffy nose, fatigue (feeling very tired), diarrhea (loose poop), and nausea or vomiting (feeling sick to the stomach or throwing up).  You may already have symptoms of COVID-19, or they may show up later.  What should I do if I have symptoms?  If you're having surgery: Call your surgeon's office.  For all other patients: Stay home and away from others (self-isolate) until ...    You've had no fever--and no medicine that reduces fever--for 3 full days (72 hours), AND    Other symptoms have gotten better. For example, your cough or breathing has improved, AND    At least 10 days have passed since your symptoms first started.  During this time    Stay in your own room, even for meals. Use your own bathroom if you can.    Stay away from others in your home. No hugging, kissing or shaking hands. No visitors.    Don't go to work, school or anywhere  "else.    Clean \"high touch\" surfaces often (doorknobs, counters, handles). Use household cleaning spray or wipes. You'll find a full list of  on the EPA website: www.epa.gov/pesticide-registration/list-n-disinfectants-use-against-sars-cov-2.    Cover your mouth and nose with a mask, tissue or washcloth to avoid spreading germs.    Wash your hands and face often. Use soap and water.    Caregivers in these groups are at risk for severe illness due to COVID-19:  ? People 65 years and older  ? People who live in a nursing home or long-term care facility  ? People with chronic disease (lung, heart, cancer, diabetes, kidney, liver, immunologic)  ? People who have a weakened immune system, including those who:    Are in cancer treatment    Take medicine that weakens the immune system, such as corticosteroids    Had a bone marrow or organ transplant    Have an immune deficiency    Have poorly controlled HIV or AIDS    Are obese (body mass index of 40 or higher)    Smoke regularly    Caregivers should wear gloves while washing dishes, handling laundry and cleaning bedrooms and bathrooms.    Use caution when washing and drying laundry: Don't shake dirty laundry and use the warmest water setting that you can.    For more tips on managing your health at home, go to www.cdc.gov/coronavirus/2019-ncov/downloads/10Things.pdf.  How can I take care of myself at home?  1. Get lots of rest. Drink extra fluids (unless a doctor has told you not to).  2. Take Tylenol (acetaminophen) for fever or pain. If you have liver or kidney problems, ask your family doctor if it's okay to take Tylenol.     Adults can take either:  ? 650 mg (two 325 mg pills) every 4 to 6 hours, or   ? 1,000 mg (two 500 mg pills) every 8 hours as needed.  ? Note: Don't take more than 3,000 mg in one day. Acetaminophen is found in many medicines (both prescribed and over-the-counter medicines). Read all labels to be sure you don't take too much.   For children, " check the Tylenol bottle for the right dose. The dose is based on the child's age or weight.  3. If you have other health problems (like cancer, heart failure, an organ transplant or severe kidney disease): Call your specialty clinic if you don't feel better in the next 2 days.  4. Know when to call 911. Emergency warning signs include:  ? Trouble breathing or shortness of breath  ? Chest pain or pressure that doesn't go away  ? Feeling confused like you haven't felt before, or not being able to wake up  ? Bluish-colored lips or face  5. If your doctor prescribed a blood thinner medicine: Follow their instructions.  Where can I get more information?    [facility] - About COVID-19:   [email or web address]    CDC - If You're Sick: cdc.gov/coronavirus/2019-ncov/about/steps-when-sick.html    CDC - Ending Home Isolation: www.cdc.gov/coronavirus/2019-ncov/hcp/disposition-in-home-patients.html    CDC - Caring for Someone: www.cdc.gov/coronavirus/2019-ncov/if-you-are-sick/care-for-someone.html    [facility] - Interim Guidance for Hospital Discharge to Home: www.health.[facility].[place]/diseases/coronavirus/hcp/hospdischarge.pdf    [facility] clinical trials (COVID-19 research studies): clinicalaffairs.[facility]-clinical-trials    Below are the COVID-19 hotlines at the [place] Department of Health ([facility]). Interpreters are available.  ? For health questions: Call 609-874-1681 or 1-558.853.6003 (7 a.m. to 7 p.m.)  ? For questions about schools and childcare: Call 271-866-6097 or 1-316.590.4205 (7 a.m. to 7 p.m.)    For informational purposes only. Not to replace the advice of your health care provider. Clinically reviewed by Infection Prevention and the [facility] COVID-19 Clinical Team. Copyright   2020 [employer]. All rights reserved. [employer] 144232 - Rev 06/07/20.             Thank you for taking steps to prevent the spread of this virus.  o Limit your contact with others.  o Wear  a simple mask to cover your cough.  o Wash your hands well and often.  o If you need medical care, go to [facility].org or contact your health care provider.     For more about COVID-19 and caring for yourself at home, visit the CDC website at https://www.cdc.gov/coronavirus/2019-ncov/about/steps-when-sick.html.     To learn about care at [facility], please go to [email or web address].     Below are the COVID-19 hotlines at the [facility] ([facility]). Interpreters are available.     For health questions: Call 047-670-8069 or 1-536.773.9304 (7 a.m. to 7 p.m.)    For questions about schools and childcare: Call 551-952-8879 or 1-461.972.6116 (7 a.m. to 7 p.m.)

## 2020-07-20 NOTE — RESULT ENCOUNTER NOTE
Could you call the patient with the following message? Thank you!    Dear Ning,    Your test for covid-19 was negative. This means you did not have the virus at the time of the test. If you develop concerning symptoms or if you have any new contact with someone who has the virus, please let us know.     Sincerely,  Dr. Hanna Palacio

## 2020-07-27 ENCOUNTER — TELEPHONE (OUTPATIENT)
Dept: FAMILY MEDICINE | Facility: CLINIC | Age: 35
End: 2020-07-27

## 2020-07-27 NOTE — TELEPHONE ENCOUNTER
Out going call made to f/u mental health referral. LMTCB, Referral information mailed to home address listed in chart.

## 2020-07-27 NOTE — LETTER
July 27, 2020       TO: Ning CANNON Giraldo  [address]  [place] [address]         Dear Ning,    I wanted to provide you the mental health referral information. Please call me directly once you have had the opportunity to review.      [facility]  [address]  [address]  [place], [place] [address]  [phone] Phone  [phone] Fax  Hours: M-F 7:30-5:30pm     [facility] Office  [address]   [address]  [place] [address]  [phone] (for appointments)  Fax: [phone]  7:30 am - 5 pm M-F, appointments available on Saturdays     [facility]) [facility]  [address]  [address]  [place] [address]  [phone] [place]  [phone] Fax  Hours:  Monday - Friday 8:30 - 5:00pm  8:00am - 5:00pm Saturday           [facility]  [address]  [place] [address]  [phone] Phone  [phone] Fax  M-F 7:30AM-7PM  Saturday 8AM-2PM     [address],  [place] [address]  [phone] Phone  M-Sat 8-8pm     [facility]  [address] [place] [address]  [phone] Phone  [phone] Fax  M-Th 8-8pm  F 8-4:30pm              Sincerely,      Mely Coleman CMA, Care Coordinator  Direct Phone: [phone]

## 2020-07-28 ENCOUNTER — OFFICE VISIT (OUTPATIENT)
Dept: FAMILY MEDICINE | Facility: CLINIC | Age: 35
End: 2020-07-28
Payer: COMMERCIAL

## 2020-07-28 VITALS
TEMPERATURE: 98.5 F | HEIGHT: 65 IN | OXYGEN SATURATION: 99 % | SYSTOLIC BLOOD PRESSURE: 126 MMHG | RESPIRATION RATE: 20 BRPM | HEART RATE: 63 BPM | WEIGHT: 258.8 LBS | BODY MASS INDEX: 43.12 KG/M2 | DIASTOLIC BLOOD PRESSURE: 88 MMHG

## 2020-07-28 DIAGNOSIS — Z11.3 SCREEN FOR STD (SEXUALLY TRANSMITTED DISEASE): Primary | ICD-10-CM

## 2020-07-28 LAB
BACTERIA: NORMAL
CLUE CELLS: NORMAL
MOTILE TRICHOMONAS: NEGATIVE
ODOR: NORMAL
PH WET PREP: 5.3
WBC WET PREP: NORMAL (ref 2–5)
YEAST: NORMAL

## 2020-07-28 SDOH — HEALTH STABILITY: MENTAL HEALTH: HOW OFTEN DO YOU HAVE A DRINK CONTAINING ALCOHOL?: 2-4 TIMES A MONTH

## 2020-07-28 SDOH — HEALTH STABILITY: MENTAL HEALTH: HOW OFTEN DO YOU HAVE 6 OR MORE DRINKS ON ONE OCCASION?: WEEKLY

## 2020-07-28 ASSESSMENT — MIFFLIN-ST. JEOR: SCORE: 1870.4

## 2020-07-28 NOTE — PATIENT INSTRUCTIONS
Patient Education     If You Think You Have an STI (STD)  Treating a sexually transmitted infection (STI),  early limits the problems they can cause and helps prevent its spread to others. An STI is also known as a sexually transmitted disease (STD). If you have an STI, get treated right away. Ask your partner to be tested, too. Then avoid sex until you ve finished treatment and your healthcare provider says it s OK to have sex again.  Follow your treatment plan  Treatment depends on the type of STI you have. Common treatments include injections and oral pills or liquids. Creams and gels can be applied to sores or warts caused by certain STIs. Follow the tips below:    Get new treatment for each new STI.    Don t use old medicine, even for the same STI. Use medicines as directed.    Don t share medicine unless instructed to do so by your healthcare provider or clinic.  Talk to your partner  If you have an STI, it s your duty to tell all your recent partners so they can be tested and treated. This is one important way to prevent the disease from being spread. Telling a partner that you have an STI can be hard. You may be embarrassed, angry, or afraid. It s often unclear who had the STI first. So try not to place blame. Your healthcare provider may offer some advice on how to start.  Prevent future problems  Even after you ve been treated, you can still be infected again. This is a common problem. It can happen if a partner passes the STI back to you. To prevent this, your partner or partners must be tested. He or she may also need treatment. After treatment, go to any scheduled follow-up visits. Then prevent future problems with safer sex. Limit your number of partners and always use a latex condom.  Diagnosing STIs  Your healthcare provider will take a health history and examine you. During your health history, you will be asked about your sex habits and health history. You may also be asked about drug use. Give  honest answers. Your healthcare provider will then check your body for signs of STIs. He or she also may do one or more of the following tests:    Fluid may be swabbed from sores. Samples also may be taken from the vagina, penis, mouth, or rectum. The samples are then tested for STIs.    Blood or urine samples may be taken. They are checked for viruses or bacteria that cause STIs.    For women, cells from the cervix are checked for signs of cancer and the genital wart virus (human papillomavirus, or HPV). This is called a Pap smear, and is often now done along with HPV testing. If cell changes are found, or a high-risk type of HPV is found, a magnifying scope may be used to take a closer look (colposcopy). In men and women, a Pap smear may be done on the anus to check for HPV-linked cancer or precancerous changes. This is done by a healthcare provider gently swabbing cells from the lining of the anus, and then sending the sample to be looked at in the lab. If there are signs of abnormality, you may need more testing.  Date Last Reviewed: 2/1/2019 2000-2019 [employer]. [address] [place] [address]. All rights reserved. This information is not intended as a substitute for professional medical care. Always follow your healthcare professional's instructions.         Patient Education     Understanding Postpartum Depression  You ve just had a baby. You expected to be excited and happy. But instead you find yourself crying for no reason. You may have trouble coping with your daily tasks. You feel sad, tired, and hopeless most of the time. You may even feel ashamed or guilty. But what you re going through is not your fault and you can feel better. Talk to your healthcare provider. He or she can help.    What is depression?  Depression is a mood disorder that affects the way you think and feel. The most common symptom is a feeling of deep sadness. You may also feel as if you just can t cope  "with life. Other symptoms include:    Gaining or losing a lot of weight    Sleeping too much or too little    Feeling tired all the time    Feeling restless    Crying a lot    Having too little or too much appetite.    Withdrawing from friends and family    Having headaches, aches and pains, or stomach problems that won't go away.    Fears of harming your baby    Lack of interest in your baby    Feeling worthless or guilty    No longer finding pleasure in things you used to    Having trouble thinking clearly or making decisions    Thinking about death or suicide  Depression after childbirth  You may be weepy and tired right after giving birth. These feelings are normal. They re sometimes called the  baby blues.  These blues go away after 1 to 2 weeks. However, postpartum (meaning  after birth ) depression lasts much longer and is more severe than the \"baby blues.\" It can make you feel sad and hopeless. You may also fear that your baby will be harmed and worry about being a bad mother.  What causes postpartum depression?  The exact cause of postpartum depression is unknown. Changes in brain chemistry or structure are believed to play a big role in depression. It may be due to changes in your hormones during and after childbirth. You may also be tired from caring for your baby and adjusting to being a mother. All these factors may make you feel depressed. In some cases, your genes may also play a role.  Depression can be treated  There are many ways to treat postpartum depression. Talking to your healthcare provider is the first step toward feeling better.  When to call your healthcare provider  Call your healthcare provider if you:     Cry for no clear reason    Have trouble sleeping, eating, and making choices    Questions whether you can handle caring for a baby    Have intense feelings of sadness, anxiety, or despair that prevent you from being able to do your daily tasks  Resources    National [place] of Mental " Uwljpd447-591-0294vtr.[facility].nih.gov    National [place] on Mental Oihftan294-889-5356uuk.[employer].org    Mental Health Waenkdh967-338-9957rdg.[facility].org    National Suicide Imomcui589-498-8154 (800-SUICIDE)    Date Last Reviewed: 7/1/2017 2000-2019 [employer]. [address] [place] [address], [place] [address]. All rights reserved. This information is not intended as a substitute for professional medical care. Always follow your healthcare professional's instructions.

## 2020-07-28 NOTE — NURSING NOTE
"Chief Complaint   Patient presents with     STD     check up. No known exposure and/or new partners but would like to know for herself.      Medication Reconciliation     completed.        /88 (BP Location: Right arm, Cuff Size: Adult Large)   Pulse 63   Temp 98.5  F (36.9  C) (Oral)   Resp 20   Ht 1.66 m (5' 5.35\")   Wt 117.4 kg (258 lb 12.8 oz)   LMP 05/01/2020   SpO2 99%   BMI 42.60 kg/m        ~ Danilo Buckley CMA ([place])  [facility]  Phone: [phone]    "

## 2020-07-28 NOTE — PROGRESS NOTES
HPI:       Ning Giraldo is a 34 year old  female presents for the new concern(s) of.    Chief Complaint   Patient presents with     STD     check up. No known exposure and/or new partners but would like to know for herself.      Medication Reconciliation     completed.        Presents for the new concern(s) of  -    -LNMP: not yet; recently had  3 months ago (infant being bottle fed)  -Here for STD check: gonorrhea, chlamydia, wet prep, and HIV  -No new partner  -Current contraception: Patch (though, she is interested in sterilization and wants to discuss it at a future visit)  -She sexually active with 1 male partner(s).  -Last sexual encounter: 5 days ago had sex  -Number of partners in last year: 1  -Pain with intercourse:No  -Bleeding with intercourse:No  -History of STD/ Vaginal infections: Chlamydia in  and Gonorrhea in   -Urinary symptoms:no.   -Last PAP smear and HPV: 2017 (next one 2024)      Medication reconciliation complete         Review of Systems:     C: NEGATIVE for fatigue, unexpected change in weight  E: NEGATIVE for acute vision problems or changes  R: NEGATIVE for significant cough or shortness of breath  CV: NEGATIVE for chest pain, palpitations or new or worsening peripheral edema  P: NEGATIVE for changes in mood or affect            PMHX:     Patient Active Problem List   Diagnosis     History of snoring     Morbid obesity (H)     Acquired pes planus of both feet     Positive GBS test     Excessive weight gain during pregnancy in third trimester     Pre-eclampsia, severe, delivered with postpartum condition     Normal spontaneous vaginal delivery     Personal history of tobacco use       Current Outpatient Medications   Medication Sig Dispense Refill     labetalol (NORMODYNE) 100 MG tablet Take 1 tablet (100 mg) by mouth 2 times daily 60 tablet 1     XULANE 150-35 MCG/24HR patch Place 1 patch onto the skin once a week       order for DME Equipment being  "ordered: Digital home blood pressure monitor kit 1 Device 0              Allergies   Allergen Reactions     No Known Allergies        No results found for this or any previous visit (from the past 24 hour(s)).            Physical Exam:     Vitals:    07/28/20 1436   BP: 126/88   BP Location: Right arm   Cuff Size: Adult Large   Pulse: 63   Resp: 20   Temp: 98.5  F (36.9  C)   TempSrc: Oral   SpO2: 99%   Weight: 117.4 kg (258 lb 12.8 oz)   Height: 1.66 m (5' 5.35\")     Body mass index is 42.6 kg/m .    GENERAL APPEARANCE: healthy, alert and no distress,  RESP: lungs clear to auscultation - no rales, rhonchi or wheezes  CV: regular rate and rhythm,  and no murmur, click,  rub or gallop  : Labia majora - lateral to the introitus (opening of vagina), covered with pubic hair. Labia minora- Medial to labia majora with no pubic hair covering.  Inspected for lesions, scars, masses   SPECULUM EXAMINATION: The labia  with the index finger and thumb of left hand. No  tenderness on insertion of the speculum. No active bleeding. Mild creamy white discharge. The cervix is then inspected - Os in the shape of a smile (multiparous). Pink, no active discharge, no ulcerations. Bimanual palpation of the pelvis: No adnexal tenderness, No masses palpated.      Assessment and Plan     (Z11.3) Screen for STD (sexually transmitted disease)  (primary encounter diagnosis)  Comment: Patient came in for STD testing. HIV negative. Wet prep negative for yeast, trichomonas, and clue cells. Chlamydia/Gono pending. Patient would like to discuss the option of sterilization at her next visit.      Plan:  -Ning will return for reevaluation with any questions or problems.   -Encouraged annual preventative exam including pelvic and pap exam.   -She was asked to use a back up method for the first one to two months of initiation of contraception.    -Also encouraged use of condomes for HIV/STD transimission prevention.  -Follow-up 8/31/2020 for " discussion about sterilization (patient does NOT want to have anymore children)    Options for treatment and follow-up care were reviewed with the patient and/or guardian. Pt and/or guardian engaged in the decision making process and verbalized understanding of the options discussed and agreed with the final plan.    Precepted today with: MD Magdaleno Weiner MD, MPH (PGY 3)  [facility] Family Medicine Resident  Pager: [phone]

## 2020-07-29 LAB — HIV 1+2 AB+HIV1 P24 AG SERPL QL IA: NEGATIVE

## 2020-07-31 LAB
C TRACH RRNA SPEC QL NAA+PROBE: POSITIVE
N GONORRHOEA RRNA SPEC QL NAA+PROBE: NEGATIVE

## 2020-08-03 ENCOUNTER — TELEPHONE (OUTPATIENT)
Dept: FAMILY MEDICINE | Facility: CLINIC | Age: 35
End: 2020-08-03

## 2020-08-03 NOTE — PROGRESS NOTES
Preceptor Attestation:  Patient seen, examined, and discussed with the resident..  I agree with written assessment and plan of care.  Supervising Physician:  Albina Velasquez MD  [facility]

## 2020-08-03 NOTE — TELEPHONE ENCOUNTER
Caller states she needs to report a positive lab results to lab. Notified Armen, call was warm transferred.

## 2020-08-04 ENCOUNTER — DOCUMENTATION ONLY (OUTPATIENT)
Dept: FAMILY MEDICINE | Facility: CLINIC | Age: 35
End: 2020-08-04

## 2020-08-04 ENCOUNTER — TELEPHONE (OUTPATIENT)
Dept: FAMILY MEDICINE | Facility: CLINIC | Age: 35
End: 2020-08-04

## 2020-08-04 DIAGNOSIS — A74.9 CHLAMYDIA INFECTION: Primary | ICD-10-CM

## 2020-08-04 RX ORDER — AZITHROMYCIN 500 MG/1
1000 TABLET, FILM COATED ORAL DAILY
Qty: 2 TABLET | Refills: 0 | Status: SHIPPED | OUTPATIENT
Start: 2020-08-04 | End: 2020-08-05

## 2020-08-04 NOTE — TELEPHONE ENCOUNTER
Spoke with MA who called patient and left another voicemail.    Wrote a paper script for Azithromycin for treatment of partner. Left the script at the . Informed MA to call patient and inform patient of the script.    Magdaleno Cortez MD, MPH (PGY 3)  [facility] Family Medicine Resident  Pager: [phone]

## 2020-08-04 NOTE — TELEPHONE ENCOUNTER
Attempted to call pharmacy for further clarification. Was transferred to speak with the pharmacist and the call ended after being on hold waiting to speak to pharmacist. Will attempt again later today. Cassandra MALIK

## 2020-08-04 NOTE — RESULT ENCOUNTER NOTE
(A74.9) Chlamydia infection  (primary encounter diagnosis)  Noted the POSITIVE CHLAMYDIA test. Called the patient on 08/04/2020 at 1000 hrs and received a voice mail. Left a HIPPA compliant voicemail. Sent PO Azithromycin 1g to the pharmacy. Encouraged to follow-up if symptoms worsen or fail to improve by calling the clinic/going to nearest health facility.    Plan: Will route message to colored team to call patient to ensure she picked up the medication    Magdaleno Cortez MD, MPH (PGY 3)  [facility] Family Medicine Resident  Pager: [phone]

## 2020-08-04 NOTE — PROGRESS NOTES
(A74.9) Chlamydia infection  (primary encounter diagnosis)  Noted the POSITIVE CHLAMYDIA test. Called the patient on 08/04/2020 at 1000 hrs and received a voice mail. Left a HIPPA compliant voicemail. Sent PO Azithromycin 1g to the pharmacy. Encouraged to follow-up if symptoms worsen or fail to improve by calling the clinic/going to nearest health facility.    Magdaleno Cortez MD, MPH (PGY 3)  [facility] Family Medicine Resident  Pager: [phone]

## 2020-08-04 NOTE — TELEPHONE ENCOUNTER
Called again for clarification. Spoke with pharmacist who stated patient already picked up medication and patient's partner will have to pay out of pocket for the medication. Will route to colored team to call patient and update that patient's medication dose is correct and patient's partner will need to pay for their own medication r/t patient's insurance will not cover partner's medication. Cassandra MALIK

## 2020-08-04 NOTE — TELEPHONE ENCOUNTER
Patient states she received a miss call from clinic. Patient thinks it might be a missed call from Dr. Cortez. Please call and advise.

## 2020-08-05 ENCOUNTER — DOCUMENTATION ONLY (OUTPATIENT)
Dept: FAMILY MEDICINE | Facility: CLINIC | Age: 35
End: 2020-08-05

## 2020-08-05 NOTE — PROGRESS NOTES
Noted RN communication. Refer to my documentation on 08/04/2020 in the patient's chart. Completed the [place] Confidential Chlamydia and Gonorrhea Report Form.    Magdaleno Cortez MD, MPH (PGY 3)  [facility] Family Medicine Resident  Pager: [phone]

## 2020-09-16 ENCOUNTER — OFFICE VISIT (OUTPATIENT)
Dept: FAMILY MEDICINE | Facility: CLINIC | Age: 35
End: 2020-09-16
Payer: COMMERCIAL

## 2020-09-16 ENCOUNTER — TELEPHONE (OUTPATIENT)
Dept: FAMILY MEDICINE | Facility: CLINIC | Age: 35
End: 2020-09-16

## 2020-09-16 VITALS
WEIGHT: 265.2 LBS | BODY MASS INDEX: 42.62 KG/M2 | DIASTOLIC BLOOD PRESSURE: 83 MMHG | RESPIRATION RATE: 16 BRPM | HEART RATE: 72 BPM | HEIGHT: 66 IN | OXYGEN SATURATION: 99 % | TEMPERATURE: 97.4 F | SYSTOLIC BLOOD PRESSURE: 140 MMHG

## 2020-09-16 DIAGNOSIS — Z11.3 SCREEN FOR STD (SEXUALLY TRANSMITTED DISEASE): ICD-10-CM

## 2020-09-16 PROBLEM — B95.1 POSITIVE GBS TEST: Status: RESOLVED | Noted: 2020-02-28 | Resolved: 2020-09-16

## 2020-09-16 PROBLEM — O26.03 EXCESSIVE WEIGHT GAIN DURING PREGNANCY IN THIRD TRIMESTER: Status: RESOLVED | Noted: 2020-03-06 | Resolved: 2020-09-16

## 2020-09-16 ASSESSMENT — ANXIETY QUESTIONNAIRES
6. BECOMING EASILY ANNOYED OR IRRITABLE: MORE THAN HALF THE DAYS
GAD7 TOTAL SCORE: 13
2. NOT BEING ABLE TO STOP OR CONTROL WORRYING: MORE THAN HALF THE DAYS
5. BEING SO RESTLESS THAT IT IS HARD TO SIT STILL: SEVERAL DAYS
3. WORRYING TOO MUCH ABOUT DIFFERENT THINGS: MORE THAN HALF THE DAYS
1. FEELING NERVOUS, ANXIOUS, OR ON EDGE: MORE THAN HALF THE DAYS
7. FEELING AFRAID AS IF SOMETHING AWFUL MIGHT HAPPEN: MORE THAN HALF THE DAYS

## 2020-09-16 ASSESSMENT — PATIENT HEALTH QUESTIONNAIRE - PHQ9
5. POOR APPETITE OR OVEREATING: MORE THAN HALF THE DAYS
SUM OF ALL RESPONSES TO PHQ QUESTIONS 1-9: 11

## 2020-09-16 ASSESSMENT — MIFFLIN-ST. JEOR: SCORE: 1915.07

## 2020-09-16 NOTE — PROGRESS NOTES
HPI:       Ning Giraldo is a 34 year old female presents for the new concern(s) of.    Chief Complaint   Patient presents with     MOOD CHANGES     follow-up, feeling tired, mood has been the same     Derm Problem     rashed in the creases of elbows and arm pits, started a month ago     Medication Reconciliation     completed     Depression/Anxiety   -Since having her baby 6 months ago, she reports increased depressed mood, diminished interest in activities, feelings of worthlessness, feeling of inappropriate guilt and libido.   -Admits to stress eating and weight gain  -symptoms have been gradually worsening; crying more then usual     -Anxiety/Depression risk factors: post-partum.  -Symptoms intrusive? sometimes  -Symptoms interfering with school/work?: sometimes  -Previous history of depression: Yes - but managed it without therapy and medication  -Current thoughts of suicide or homicide:No  -History of physical or sexual abuse?: no  -NO Guns stored in the home  -Stressors: Family, financial issues, STD from partner   -Previous treatment modalities employed include none.        Recent PHQ-9 Scores:     PHQ-9 SCORE 7/15/2020 9/16/2020   PHQ-9 Total Score 13 11     Recent NEVILLE-7 Scores:      NEVILLE-7 SCORE 9/16/2020   Total Score 13         Today' sPHQ 9 Reviewed and it is improving        Medication Reconciliation completed         Review of Systems:     C: NEGATIVE for fatigue, unexpected change in weight  E: NEGATIVE for acute vision problems or changes  R: NEGATIVE for significant cough or shortness of breath  CV: NEGATIVE for chest pain, palpitations or new or worsening peripheral edema  P: NEGATIVE for changes in mood or affect            PMHX:     Patient Active Problem List   Diagnosis     History of snoring     Morbid obesity (H)     Acquired pes planus of both feet     Personal history of tobacco use       Current Outpatient Medications   Medication Sig Dispense Refill     labetalol (NORMODYNE) 100  "MG tablet Take 1 tablet (100 mg) by mouth 2 times daily 60 tablet 1     order for DME Equipment being ordered: Digital home blood pressure monitor kit 1 Device 0     XULANE 150-35 MCG/24HR patch Place 1 patch onto the skin once a week                Allergies   Allergen Reactions     No Known Allergies        No results found for this or any previous visit (from the past 24 hour(s)).            Physical Exam:     Vitals:    09/16/20 1353 09/16/20 1355   BP: (!) 140/83 (!) 140/83   BP Location: Right arm Right arm   Cuff Size: Adult Large Adult Regular   Pulse: 72    Resp: 16    Temp: 97.4  F (36.3  C)    TempSrc: Oral    SpO2: 99%    Weight: 120.3 kg (265 lb 3.2 oz)    Height: 1.685 m (5' 6.34\")      Body mass index is 42.37 kg/m .    GENERAL APPEARANCE: healthy, alert and no distress,  RESP: lungs clear to auscultation - no rales, rhonchi or wheezes  CV: regular rate and rhythm,  and no murmur, click,  rub or gallop  MS: extremities normal- no gross deformities noted  PSYCH: fatigued, worried and sad      Assessment and Plan     (O99.345,  F53.0) Post-partum depression  (primary encounter diagnosis)  Comment: HPI and PHQ-9 consistent with the above. Reviewed concept of depression as function of biochemical imbalance of neurotransmitters/rationale for treatment. Also offered support and listening ear.  Risks and benefits of medication reviewed with patient.  She feels she is not ready for pharmacotherapy; but she is really interested in seeing a therapist. No SI or HI today. Crisis numbers offered. Questions answered.  Counseling advised    Plan  -Patient advised immediate presentation to hospital for suicidal thought, etc.    -Monitor for new or worsening symptoms, Evaluate for metabolic cause with labs as ordered and Encouraged regular exercise  -SW will speak with Ning (refer to the note)  -[facility]    -Follow-up with me on 09/25/2020    (Z11.3) Screen for STD (sexually transmitted " disease)  Comment: Patient request  Plan: Chlamydia/Gono Amplified ([facility]) - negative    Options for treatment and follow-up care were reviewed with the patient and/or guardian. Pt and/or guardian engaged in the decision making process and verbalized understanding of the options discussed and agreed with the final plan.    Precepted today with: MD Magdaleno Craig MD, MPH (PGY 3)  [facility] Family Medicine Resident  Pager: [phone]

## 2020-09-16 NOTE — PATIENT INSTRUCTIONS
"Patient Education     2006 [place] Department of Health. Reprinted with permission. "[employer] [id number] - REV 04/10.  Postpartum Depression  When Caring for Your Baby Is Not What You Expected  Stories from other mothers  \"This was my third baby, but it wasn't the happy, joyful experience I had expected. I felt anxious and irritable. I didn't want to get out of bed in the morning. I didn't feel connected to my baby.\" - Roxy  \"We were thrilled to bring home our first healthy baby. But in those first few weeks I felt tired and cried easily. I thought it was just the hormones and getting used to a . After six months, when little things would still set me off, my  convinced me to talk to my doctor.\" - Henrietta  \"I love children and couldn't wait to have my own. Then my  went back to work, and I started having thoughts about hurting my baby. No matter what I did, I couldn't stop the thoughts. I lived in fear but kept it a secret.\" - Sue  \"It has been two months since I saw my doctor, and I feel like a different person. The medicine has helped and my family has been very supportive. I have energy again, and I love being a mother.\" - Shahnaz  You are not alone  Although postpartum depression is common, it is serious--and treatable. If you think you might have it, tell your doctor or another health care provider. With help, you can feel like yourself again.   The baby blues   Having a baby brings big changes in a woman's life. These changes can be overwhelming. While most moms get past the \"baby blues\" within the first two weeks, some moms struggle for longer.   If the baby blues last longer than two weeks, you may have postpartum depression.  Postpartum depression  It is easy to confuse the symptoms of postpartum depression with normal hormone changes. How can you tell if it's serious? Watch for these symptoms:    Feeling sad, anxious or \"empty\"    Lack of energy, feeling very tired    Lack of interest " "in normal activities    Changes in sleeping or eating patterns    Feeling hopeless, helpless, guilty or worthless    Feeling queen and irritable    Problems concentrating or making simple decisions    Thoughts about hurting your baby, even if you will not act on them    Thoughts about death or suicide    Things you can do  Being a good mom means taking care of yourself. If you take care of yourself, you can take better care of your baby and your family.    Get help. Talk with your care provider, call an emergency support line or ask a loved one to help you get the care you need.    Ask your care provider about medicines that can be safely used for postpartum depression.    Talk to a therapist, alone or in group therapy.    Ask your ethel or community leaders about other support resources.    Learn as much as you can about postpartum depression.    Get support from family and friends. Ask for help when you need it.    Keep active by walking, stretching, swimming and so on.    Get enough rest.    Eat a healthy diet.  Don't give up! It may take more than one try to get the help you need.  What you should know  It is very common for new moms to have the \"baby blues.\" They often start a few days after a baby's birth. Usually, feeling sad and irritable will not stop you from taking care of your baby or yourself.   If symptoms interfere with your life or last longer than two to three weeks, you may have postpartum depression. This affects up to 2 out of 10 moms. It can occur any time in your baby's first year.   Women who have a history of depression are more likely to become depressed during pregnancy or after birth. Depression can be caused by stress, hormone changes, trauma, lack of support and other factors.   If you are depressed, you need to get help. It will not get better on its own.  The best treatment   The most effective treatment for depression includes:    Individual or group therapy    Medicine that can be " safely used while breastfeeding (prescribed by your doctor)    Support from your family, friends and community  Who to contact for help   [facility]: 1-876.824.1498; -283-9604  [facility]: 707.470.9629 (Specialists in postpartum depression offer individual, couples and group therapy)   [facility]: www.nusrat.org  National Suicide Prevention Lifeline: 0-104-338-TALK [9661]  PPD Hope Information Center: www.ppdhope.com  Pregnancy and Postpartum Support [place]: www.pregnancypostpartumsupportmn.org  This brochure meets the requirements of [place] Statute 145.906. For more information, call the [place] Department of Health at 139-461-3061 or visit our website at www.health.[facility].[place]/divs/fh/mch/.

## 2020-09-16 NOTE — TELEPHONE ENCOUNTER
Face to face meeting with patient to review mental health referral and information. Patient is requesting to be seen here at [facility] with Dr. Chaparro Barnes. She has been scheduled 09/25 2020 video visit, address has been updated, confirmed e-mail address. Patient provided with mental health resource information along with my direct clinic phone number should she have additional needs.

## 2020-09-17 LAB
C TRACH RRNA SPEC QL NAA+PROBE: NEGATIVE
N GONORRHOEA RRNA SPEC QL NAA+PROBE: NEGATIVE

## 2020-09-17 ASSESSMENT — ANXIETY QUESTIONNAIRES: GAD7 TOTAL SCORE: 13

## 2020-09-25 ENCOUNTER — VIRTUAL VISIT (OUTPATIENT)
Dept: PSYCHOLOGY | Facility: CLINIC | Age: 35
End: 2020-09-25
Payer: COMMERCIAL

## 2020-09-25 DIAGNOSIS — F33.1 MDD (MAJOR DEPRESSIVE DISORDER), RECURRENT EPISODE, MODERATE (H): Primary | ICD-10-CM

## 2020-09-26 NOTE — PROGRESS NOTES
Behavioral Health Diagnostic Assessment:    Meeting was: scheduled  Others present: n/a  Meeting lasted: 55 minutes  Client was: on time    Telemedicine Visit: The patient's condition can be safely assessed and treated via synchronous audio and visual telemedicine encounter.      Reason for Telemedicine Visit: Patient has requested telehealth visit    Originating Site (Patient Location): Patient's home    Distant Site (Provider Location): Provider Remote Setting    Consent:  The patient/guardian has verbally consented to: the potential risks and benefits of telemedicine (telephone and/or video visit) versus in person care; bill my insurance or make self-payment for services provided; and responsibility for payment of non-covered services.     Mode of Communication:  Video Conference via [employer] video meeting.    As the provider I attest to compliance with applicable laws and regulations related to telemedicine.    Emergency contact: declined  Closest Emergency Room: [facility]  Location at time of call: See above    Assessment Summary: Diagnostic completed today. The patient is a 34 year old American female who was referred for mental health services for help with depression. Based on the patient's report of symptoms, she likely meets criteria for MDD. The patient's mental health concerns have been affectingher ability to function in daily living and have been causing clinically significant distress. The patient also reports struggles with smoking and strong desire to stop this.  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 Ning denies safety concerns. Based on the patient's reported symptoms and impact on functioning, the plan for the patient is to participate in psychotherapy on a bi-weekly basis.    DSM-V Diagnoses:  Major Depressive Disorder, Recurrent, Moderate Severity    Orientation, Recommendations, &  Plan:       Rights to and limits to confidentiality were discussed with patient.    Integrated care team and shared chart were discussed with patient and agreed upon.    Follow-up in 2-3 weeks to establish regular psychotherapy to address depressive symptoms.    Goals for therapy = see tx plan, below    Mental Health Screening Questionnaires:    PHQ-9 SCORE 7/15/2020 9/16/2020   PHQ-9 Total Score 13 11     NEVILLE-7 SCORE 9/16/2020   Total Score 13     Current Presenting Problems or Complaints (including patient perception of problem and external factors contributing to current dilemma):     Depression: low mood, anhedonia, lethargy, insomnia, poor concentration and memory  Isabel: n/a  Psychosis: n/a  Anxiety: n/a  Post Traumatic Stress Disorder: n/a    Functioning:  Relatively low; patient is taking care of children and making it to work, but reports that these efforts are considerable laborious     Patient Strengths and Resources: strong social support, motivated to participate in therapy    Previous Mental Health Concerns/Treatment:    Outpatient: None    Inpatient: None    Chemical Health History:    Alcohol Use: occasional  Drug Use: n/a  Prescription Misuse: n/a  Tobacco Use: 2 packs per week    Have you ever thought about cutting down your drug/alcohol use?  No  Do you ever get annoyed when people ask you about your drug/alcohol use: No  Do you ever feel guilty about your drug/alcohol use: No  Do you ever drink alcohol or use drugs in the morning: No    Patient past attempts to control alcohol/drug use (including informal approaches or formal treatment: Patient has tried on her own to stop smoking several times; she is motivated to seek out other resources and help with this now.    Have there been any consequences related to clients drug use? no.    Mental Status Exam:    Appearance:  No apparent distress  Behavior/Relationship to Examiner/Demeanor:  Engaged  Build: heavy   Gait:  unk  Psychomotor Activity:  normal  Speech rate:  Normal  Speech volume:  Normal  Speech coherence:  Normal  Speech spontaneity:  Normal  Mood (subjective report):  depressed  Affect (objective appearance):  Subdued  Eye Contact: good  Thought Process (Associations):  Logical  Thought process (Rate):  Normal  Abnormal Perception:  None  Sensorium:  Alert  Attention/Concentration:  Normal  Insight:  Good  Judgment:  Good    Suicide Assessment:    Recent suicidal thoughts: No  Past suicidal thoughts: No  Any attempts in the past: No  Any family/friends/loved ones die by suicide: No  Plan or considering various methods: No  Access to guns: No  Protective factors: commitment to family  Verbal contract for safety: N/A    Non-Suicidal Self Injurious Behavior: No    Violence/Homicide Risk Assessment:     Problems with anger management: No  History of violence: No  History of significant damage to property: No  Threat made to harm or kill someone: No  Verbal contract for safety: N/A    Safety Plan:   Ning was provided with the phone number for emergency mental health services and was encouraged to call these local crisis numbers, 911, or visit a local emergency room if thoughts of suicide or homicide were to arise and/or if they were to be in acute distress. The patient agreed to utilize these services as indicated if the need were to arise. Ning denied past or current suicidal or homicidal ideation, intent, or plan, denied a history of suicide attempts/self-harming behaviors, and identified commitment to family as  primary protective factor(s) to self-harm or harm to others. Based on these factors, Ning is considered to be sustainable as an outpatient at this time.     Social History and Associated Level of Functioning (See below):    Current Living Arrangements: lives by self with three boys    Family/Children: see above    Intimate Relationship/Marriage: see above    Social Connection: see above    Developmental History: unk    Abuse/Trauma:  n/a    Work: employed    Education: hs    Legal: n/a    Cultural/Belief System: Western    Personal Health:     Patient Active Problem List   Diagnosis     History of snoring     Morbid obesity (H)     Acquired pes planus of both feet     Personal history of tobacco use     Current Outpatient Medications   Medication     labetalol (NORMODYNE) 100 MG tablet     order for DME     XULANE 150-35 MCG/24HR patch     No current facility-administered medications for this visit.        Family Health History: see above    NOTE: Diagnostic complete; update due on 9/25/2020    --------------------------------------------------------------------------------------    Treatment Plan    Client's Legal Name: Ning Giraldo    Client's Preferred Name:Ning  YOB: 1985  Today's Date: September 25, 2020    Date Diagnostic Update Due: 9/25/2020    DSM-V Diagnoses:  Major Depressive Disorder, Recurrent, Moderate Severity    Psychosocial / Contextual Factors:   The patient's mental health concerns have been continuing to affect her ability to function in daily living and have been causing clinically significant distress.      Collaboration: Magdaleno Cortez    Referral: Magdaleno Cortez    Anticipated treatment duration: Unknown  Agreed upon meeting frequency: biweekly    Long Term Treatment Goal(s) related to diagnosis / functional impairment(s)  Goal 1: Ning will improve low / depressed mood (and concomitant symptoms) to an everyday level to an everyday level that is such that said mood does not deleteriously impact everyday functioning.    Steps we will take to achieve your goal:    Ning will identify and employ a range of self-care behaviors (e.g., physical activity, identifying/challenging/replacing self-defeating cognitions, increasing social-connectedness with family/friends).    Intervention(s)  Therapist will provide support, psychoeducation and homework assignments as needed.    Goal 2: Ning will  improve sleep consistency and quality to at least five-nights-per-week of good sleep.    Steps we will take to achieve your goal:    Ning will learn and employ a range of sleep hygeine strategies (e.g., set wake- and bed- times, getting out of bed for at least 30 minutes when unable to fall asleep after 10-15 minutes, relaxing nighttime routines sans electronics, self-soothing/meditation sequences).    Intervention(s)  Therapist will provide support, psychoeducation and homework assignments as needed.    Goal 3: Ning will reduce (or quit) smoking.    Steps we will take to achieve your goal:    Ning will connect with clinic staff / resources for smoking cessation support.    Intervention(s)  Therapist will provide support, psychoeducation and homework assignments as needed.    If you need additional support and care during times that your therapist or PCP are not available, here are some additional resources for you:    Help in a Crisis:    Crisis numbers- available 24 hours a day  [facility] Multilingual Crisis Line:  [phone]  Yog sharminj xav tau neeg pab sharminj es hais lub hmoob no hu christin tus xov tooj no ua ntej.    [facility]: [phone]     National Suicide Prevention Hotline: 1-731.631.5408    Crisis Walk-In Clinic:  [facility]  [address] [place] [address]   [phone]     Walk-In at [facility] (free counseling- Call first for services in Nigerian or Hmong)   [facility] en espanol.     [address]   M, W 5-7pm  [phone]      Crisis Text Line: Text [place] to 643907. Free support at your fingertips 24/7  People who text [place] to 377655 will be connected with a counselor. Crisis Text Line is available 24 hours a day, seven days a week.    If you feel at risk of immediate harm, go directly to the Emergency Department.    Patient has reviewed and agreed to the above plan. (verbally, via [employer] video)    Chaparro Shah  Whitefield, PhD  September 25, 2020      ______________________________    ________  Patient Signature       Date    ______________________________    ________  Provider Signature       Date

## 2020-10-16 ENCOUNTER — VIRTUAL VISIT (OUTPATIENT)
Dept: PSYCHOLOGY | Facility: CLINIC | Age: 35
End: 2020-10-16
Payer: COMMERCIAL

## 2020-10-16 DIAGNOSIS — F33.1 MDD (MAJOR DEPRESSIVE DISORDER), RECURRENT EPISODE, MODERATE (H): Primary | ICD-10-CM

## 2020-10-16 PROCEDURE — 90834 PSYTX W PT 45 MINUTES: CPT | Mod: 95 | Performed by: MARRIAGE & FAMILY THERAPIST

## 2020-10-16 NOTE — PROGRESS NOTES
Behavioral Health Progress Note    Client Legal Name: Ning Giraldo   Client Preferred Name: Ning   Service Type: Individual  Length of Visit: 45 minutes  Attendees: Patient + Dr. Barnes    Telemedicine Visit: The patient's condition can be safely assessed and treated via synchronous audio and visual telemedicine encounter.      Reason for Telemedicine Visit: Patient has requested telehealth visit    Originating Site (Patient Location): Patient's home    Distant Site (Provider Location): Provider Remote Setting    Consent:  The patient/guardian has verbally consented to: the potential risks and benefits of telemedicine (telephone and/or video visit) versus in person care; bill my insurance or make self-payment for services provided; and responsibility for payment of non-covered services.     Mode of Communication:  Video Conference via [employer] video meeting.    As the provider I attest to compliance with applicable laws and regulations related to telemedicine.    Emergency contact: declined  Closest Emergency Room: [facility]  Location at time of call: See above    Identifying Information and Presenting Problem:    The patient is a 34 year old  female who is being seen for problematic symptoms of depression.    Treatment Objective(s) Addressed in This Session:  Depressed Mood: Decrease frequency and intensity of feeling down, depressed, hopeless      Progress on / Status of Treatment Objective(s) / Homework:  Satisfactory progress     Topics Discussed/Interventions Provided:  Patient presented with continued struggles with depressive symptoms (e.g., low mood, insomnia, sense of helplessness / hopelessness), but maintained that she is feeling a bit better since previous visit (see file).  She described socializing with some friends, returning to work, and engaging in sleep hygiene strategies.  She also described returning to her job for a few hours per week, and that this has felt good for  her.  Primary depressive symptoms we connected to, per patient's report, a sense of social isolation secondary to being a single mother during a pandemic that requires social distancing.  Normalized and empathized patient's struggles.  Applauded and encouraged more of the same in terms of patient's engagement in self care sequences; she communicated motivation to proceed as indicated.    Assessment: The patient appeared to be active and engaged in today's session and was receptive to feedback.     Mental Status: Ning appeared generally alert and oriented. Dress was casual and appropriate to the weather and occasion. Grooming and hygiene were appropriate. Eye contact was good. Speech was of normal volume and rate and was clear, coherent, and relevant. Mood was depressed with congruent affect. Thought processes were relevant, logical and goal-directed. Thought content was WNL with no evidence of psychotic or paranoid features. No evidence of SI/HI or self-harm, intent, or plans. Memory appeared grossly intact. Insight and judgment appeared intact and patient exhibited no impulse control during the appointment.     Does the patient appear to be at imminent risk of harm to self/others at this time? No    The session was necessary to address depressive symptoms that have been interfering with patient's ability to function at daily living.  Ongoing psychotherapy is necessary to provide support.    Diagnosis (DSM-5):  MDD, Recurrent, Moderate Severity    Plan:  1. Follow up in two weeks.  2. Work on goals as noted in patient instructions.  3. Utilize crisis resources as needed.      NOTE: Diagnostic update due on 9/25/2021.  NOTE: Tx Plan update due on 12/25/2020.

## 2020-11-17 RX ORDER — LABETALOL 100 MG/1
100 TABLET, FILM COATED ORAL 2 TIMES DAILY
Qty: 60 TABLET | Refills: 0 | Status: SHIPPED | OUTPATIENT
Start: 2020-11-17 | End: 2021-01-05

## 2020-11-20 ENCOUNTER — VIRTUAL VISIT (OUTPATIENT)
Dept: PSYCHOLOGY | Facility: CLINIC | Age: 35
End: 2020-11-20
Payer: COMMERCIAL

## 2020-11-20 DIAGNOSIS — F33.1 MDD (MAJOR DEPRESSIVE DISORDER), RECURRENT EPISODE, MODERATE (H): Primary | ICD-10-CM

## 2020-11-20 PROCEDURE — 90834 PSYTX W PT 45 MINUTES: CPT | Mod: 95 | Performed by: MARRIAGE & FAMILY THERAPIST

## 2020-11-20 NOTE — PROGRESS NOTES
Behavioral Health Progress Note    Client Legal Name: Ning Giraldo   Client Preferred Name: Ning   Service Type: Individual  Length of Visit: 45 minutes  Attendees: Patient + Dr. Barnes    Telemedicine Visit: The patient's condition can be safely assessed and treated via synchronous audio and visual telemedicine encounter.      Reason for Telemedicine Visit: Patient has requested telehealth visit    Originating Site (Patient Location): Patient's home    Distant Site (Provider Location): Provider Remote Setting    Consent:  The patient/guardian has verbally consented to: the potential risks and benefits of telemedicine (telephone and/or video visit) versus in person care; bill my insurance or make self-payment for services provided; and responsibility for payment of non-covered services.     Mode of Communication:  Telephone visit through [employer] telephone ([phone])    As the provider I attest to compliance with applicable laws and regulations related to telemedicine.    Emergency contact: declined  Closest Emergency Room: [facility]  Location at time of call: See above    Identifying Information and Presenting Problem:    The patient is a 34 year old  female who is being seen for problematic symptoms of depression.    Treatment Objective(s) Addressed in This Session:  Depressed Mood: Decrease frequency and intensity of feeling down, depressed, hopeless      Progress on / Status of Treatment Objective(s) / Homework:  Satisfactory progress     Topics Discussed/Interventions Provided:  Patient presented with continued struggles with depressive symptoms (e.g., sadness / low mood, insomnia, sense of helplessness / hopelessness), but maintained that she is also continuing to feel a bit better over sequential visits (see file).  Principal concerns discussed today relate to fear of COVID-19; the patient described several friends, family, and colleagues who have tested positive for  "the virus -- and that these connections have made the risk(s) feel more \"real\" to her.  Patient discussed plans to not celebrate holidays with family this year, alongside a variety of precautions that she is taking in her work.  She also discussed ways that she intends to celebrate said holidays with her children and loved ones virtually and/or through gift exchanges.  Normalized and empathized patient's struggles.  Applauded and encouraged more of the same in terms of patient's engagement in safety and self care sequences; she communicated motivation to proceed as indicated.    Assessment: The patient appeared to be active and engaged in today's session and was receptive to feedback.     Mental Status: Ning appeared generally alert and oriented. Dress was casual and appropriate to the weather and occasion. Grooming and hygiene were appropriate. Eye contact was good. Speech was of normal volume and rate and was clear, coherent, and relevant. Mood was depressed with congruent affect. Thought processes were relevant, logical and goal-directed. Thought content was WNL with no evidence of psychotic or paranoid features. No evidence of SI/HI or self-harm, intent, or plans. Memory appeared grossly intact. Insight and judgment appeared intact and patient exhibited no impulse control during the appointment.     Does the patient appear to be at imminent risk of harm to self/others at this time? No    The session was necessary to address depressive symptoms that have been interfering with patient's ability to function at daily living.  Ongoing psychotherapy is necessary to provide support.    Diagnosis (DSM-5):  MDD, Recurrent, Moderate Severity    Plan:  1. Follow up in four weeks.  2. Work on goals as noted in patient instructions.  3. Utilize crisis resources as needed.          NOTE: Diagnostic update due on 9/25/2021.  NOTE: Tx Plan update due on 12/25/2020.  "

## 2020-12-20 ENCOUNTER — HEALTH MAINTENANCE LETTER (OUTPATIENT)
Age: 35
End: 2020-12-20

## 2021-01-07 RX ORDER — LABETALOL 100 MG/1
100 TABLET, FILM COATED ORAL 2 TIMES DAILY
Qty: 30 TABLET | Refills: 1 | Status: SHIPPED | OUTPATIENT
Start: 2021-01-07 | End: 2021-02-12

## 2021-02-12 NOTE — TELEPHONE ENCOUNTER
Message to physician: labetalol    Date of last visit: Visit date not found     Date of next visit if scheduled: Visit date not found       Last Comprehensive Metabolic Panel:  Sodium   Date Value Ref Range Status   03/23/2018 138.0 133.0 - 144.0 mmol/L Final     Potassium   Date Value Ref Range Status   03/23/2018 3.7 3.4 - 5.3 mmol/L Final     Chloride   Date Value Ref Range Status   03/23/2018 102.0 94.0 - 109.0 mmol/L Final     Carbon Dioxide   Date Value Ref Range Status   03/23/2018 28.0 20.0 - 32.0 mmol/L Final     Glucose   Date Value Ref Range Status   03/23/2018 102.0 60.0 - 109.0 mg/dL Final     Urea Nitrogen   Date Value Ref Range Status   03/23/2018 8.0 5.0 - 24.0 mg/dL Final     Creatinine   Date Value Ref Range Status   03/23/2018 0.8 0.6 - 1.3 mg/dL Final     GFR Estimate   Date Value Ref Range Status   04/30/2015 >60 >60 mL/min/1.73m2 Final     Calcium   Date Value Ref Range Status   03/23/2018 8.8 8.5 - 10.4 mg/dL Final       BP Readings from Last 3 Encounters:   09/16/20 (!) 140/83   07/28/20 126/88   07/15/20 (!) 200/90       Lab Results   Component Value Date    A1C 5.4 03/23/2018    A1C 5.7 02/04/2015                Please complete refill and CLOSE ENCOUNTER.  Closing the encounter signifies the refill is complete.

## 2021-02-13 RX ORDER — LABETALOL 100 MG/1
100 TABLET, FILM COATED ORAL 2 TIMES DAILY
Qty: 30 TABLET | Refills: 0 | Status: SHIPPED | OUTPATIENT
Start: 2021-02-13 | End: 2021-03-03

## 2021-02-22 ENCOUNTER — OFFICE VISIT (OUTPATIENT)
Dept: FAMILY MEDICINE | Facility: CLINIC | Age: 36
End: 2021-02-22
Payer: COMMERCIAL

## 2021-02-22 VITALS
HEIGHT: 66 IN | OXYGEN SATURATION: 97 % | DIASTOLIC BLOOD PRESSURE: 83 MMHG | BODY MASS INDEX: 41.14 KG/M2 | TEMPERATURE: 97.7 F | SYSTOLIC BLOOD PRESSURE: 137 MMHG | HEART RATE: 73 BPM | RESPIRATION RATE: 20 BRPM | WEIGHT: 256 LBS

## 2021-02-22 DIAGNOSIS — B37.89 CANDIDIASIS OF BREAST: ICD-10-CM

## 2021-02-22 DIAGNOSIS — R06.81 APNEA: ICD-10-CM

## 2021-02-22 DIAGNOSIS — Z00.00 ROUTINE HISTORY AND PHYSICAL EXAMINATION OF ADULT: Primary | ICD-10-CM

## 2021-02-22 LAB
% GRANULOCYTES: 44 %G (ref 40–75)
GRANULOCYTES #: 2 K/UL (ref 1.6–8.3)
HBA1C MFR BLD: 5.5 % (ref 4.1–5.7)
HCT VFR BLD AUTO: 39 % (ref 35–47)
HEMOGLOBIN: 11.7 G/DL (ref 11.7–15.7)
HIV 1+2 AB+HIV1 P24 AG SERPL QL IA: NEGATIVE
LYMPHOCYTES # BLD AUTO: 2.1 K/UL (ref 0.8–5.3)
LYMPHOCYTES NFR BLD AUTO: 45.8 %L (ref 20–48)
MCH RBC QN AUTO: 27.5 PG (ref 26.5–35)
MCHC RBC AUTO-ENTMCNC: 30 G/DL (ref 32–36)
MCV RBC AUTO: 91.5 FL (ref 78–100)
MID #: 0.5 K/UL (ref 0–2.2)
MID %: 10.2 %M (ref 0–20)
PLATELET # BLD AUTO: 263 K/UL (ref 150–450)
RBC # BLD AUTO: 4.26 M/UL (ref 3.8–5.2)
WBC # BLD AUTO: 4.6 K/UL (ref 4–11)

## 2021-02-22 PROCEDURE — 85025 COMPLETE CBC W/AUTO DIFF WBC: CPT | Performed by: STUDENT IN AN ORGANIZED HEALTH CARE EDUCATION/TRAINING PROGRAM

## 2021-02-22 PROCEDURE — 83036 HEMOGLOBIN GLYCOSYLATED A1C: CPT | Performed by: STUDENT IN AN ORGANIZED HEALTH CARE EDUCATION/TRAINING PROGRAM

## 2021-02-22 PROCEDURE — 99385 PREV VISIT NEW AGE 18-39: CPT | Mod: GC | Performed by: STUDENT IN AN ORGANIZED HEALTH CARE EDUCATION/TRAINING PROGRAM

## 2021-02-22 PROCEDURE — 36415 COLL VENOUS BLD VENIPUNCTURE: CPT | Performed by: STUDENT IN AN ORGANIZED HEALTH CARE EDUCATION/TRAINING PROGRAM

## 2021-02-22 RX ORDER — NYSTATIN 100000 [USP'U]/G
POWDER TOPICAL DAILY
Qty: 60 G | Refills: 0 | Status: SHIPPED | OUTPATIENT
Start: 2021-02-22 | End: 2022-07-08

## 2021-02-22 ASSESSMENT — MIFFLIN-ST. JEOR: SCORE: 1862.96

## 2021-02-22 NOTE — PATIENT INSTRUCTIONS
Www.[email or web address].org is a great website for making decisions about birth control     Referral for :     Sleep Eval    LOCATION/PLACE/Provider :    [facility]   DATE & TIME :    Faxed, location will call   PHONE :     [phone]  FAX :    [phone]  Appointment made by clinic staff/:    Gia

## 2021-02-22 NOTE — PROGRESS NOTES
"Female Physical Note    Concerns today:     Chronic fatigue  Fatigue \"on and off\". Sleeps 7 hours a night, but wakes up to take care of her baby. She does not feel fully rested in the morning. May fall asleep to a movie, but not driving. Previous partner said she pauses breathing when sleeping. No prior sleeping study. TSH normal in . She is trying to improve her diet by switching from red meat to lean meat. Exercises 2x/week, 45 minutes on the elliptical. Following with behavioral health for depression. Mood is \"okay\".     Left breast rash  Started 2-3 days ago. Area is currently red and tender. She believes it is from the friction of her bra. Applies A+D cream and dries the area with a paper towel. Similar rash occurred a month ago and resolved on its own.     STD testing  Patient requests STD testing. A few weeks ago she had \"vaginal discomfort\", resolved. No perineal burning, itching, vaginal discharge, abnormal odor, dysuria or urinary frequency. She is have sexual intercourse with her ex-partner, baby's dad. Using condoms. May not be a closed relationship. No concern for partner violence, or mistreatment. History of gonorrhea and chlamydia. Last C/G negative 20, HIV negative 20, Syphilis negative 3/26/20. Hep C screen negative on 2/4/15.       Menarche: ~age 12.   LMP: ~30 days ago. Occurs when she removes the patch. No concerns.   Contraception: Xulane 150-35 mcg/hr patch. Been on for decades, and successful so far, even with weight >200 lbs.    Last Pap Smear with HPV, both negative, on 3/3/17. Repeat in 2022.    Abnormal Pap History: 2/5/15 \"Atypical glandular cells of endocervical origin\". Two normal pap smears since then.     ROS:  CONSTITUTIONAL: Positive for chronic fatigue; no unexpected change in weight  SKIN: Positive for left breast rash  EYES: no acute vision problems or changes  ENT: no ear problems, no throat problems  RESP: no significant cough, no shortness of " breath  CV: no chest pain, no palpitations, no new or worsening peripheral edema  GI: no nausea, no vomiting, no constipation, no diarrhea  : described above    Patient Active Problem List   Diagnosis     History of snoring     Morbid obesity (H)     Acquired pes planus of both feet     Personal history of tobacco use     Current Outpatient Medications   Medication Sig Dispense Refill     labetalol (NORMODYNE) 100 MG tablet Take 1 tablet (100 mg) by mouth 2 times daily 30 tablet 0     order for DME Equipment being ordered: Digital home blood pressure monitor kit 1 Device 0     XULANE 150-35 MCG/24HR patch Place 1 patch onto the skin once a week       Past Medical History:   Diagnosis Date     Atypical glandular cells on cervical Pap smear 2016    Pap smear history: : Abnormal Pap, had colposcopy which was normal. 2014: Pap with cotest normal with negative HPV. 2015: Atypical glandular cells of endocervical origin. No HPV detected. 2015: Colposcopy with abnormal appearing area at 9 o'clock, pathology suggestive of HPV effect, no malignancy. Plan was for repeat Pap and cotest at 12 and 24 months. 2016: Pap with co     Blighted ovum 2018     Excessive weight gain during pregnancy in third trimester 3/6/2020     History of colposcopy with cervical biopsy age 14     History of snoring 2015     Normal spontaneous vaginal delivery 2020     Positive GBS test 2020     Tobacco dependence 2015     Family History     Problem (# of Occurrences) Relation (Name,Age of Onset)    Colon Cancer (1) Maternal Grandmother (65)    Coronary Artery Disease (2) Mother, Father    Heart Failure (2) Mother, Father: living    Hypertension (1) Mother: living    Liver Disease (1) Mother: hepatitis c    Myocardial Infarction (1) Maternal Aunt: 2 aunts  in 50's     Ovarian Cancer (1) Sister:  at age 40    Pacemaker (1) Father    Prostate Cancer (1) Father       Negative family history  "of: Diabetes, Cerebrovascular Disease, Thyroid Disease, Breast Cancer        Problem List Medication List and Allergy List were reviewed.    Patient is an established patient of this clinic..    Social History     Tobacco Use     Smoking status: Current Some Day Smoker     Types: Cigarettes     Smokeless tobacco: Never Used     Tobacco comment: Mainly on the weekends when drinking, about one pack per week.    Substance Use Topics     Alcohol use: Yes     Alcohol/week: 2.0 standard drinks     Types: 2 Standard drinks or equivalent per week     Frequency: 2-4 times a month     Binge frequency: Weekly     Social Hx:  Single. Has 3 sons.     Has anyone hurt you physically, for example by pushing, hitting, slapping or kicking you or forcing you to have sex? Denies  Do you feel threatened or controlled by a partner, ex-partner or anyone in your life? Denies    RISK BEHAVIORS AND HEALTHY HABITS:  Tobacco Use/Smokin pack per week  Illicit Drug Use: none  Do you use alcohol?  4-5 drinks per week  Diet (5-7 servings of fruits/veg daily): reduced beef and more lean meats  Exercise (30 min accumulated most days): elliptical for 45 minutes twice weekly  Dental Care: goes annually, but currently working on insurance for this year  Calcium 1500 mg/d: no vitamin supplement but eats dairy regularly in her diet.   Seat Belt Use: Yes    Immunization History   Administered Date(s) Administered     Influenza Vaccine IM > 6 months Valent IIV4 2018, 2020     TDAP Vaccine (Boostrix) 2020     Tdap (Adacel,Boostrix) 2010     EXAMINATION:   /83   Pulse 73   Temp 97.7  F (36.5  C) (Oral)   Resp 20   Ht 1.676 m (5' 6\")   Wt 116.1 kg (256 lb)   SpO2 97%   BMI 41.32 kg/m    GENERAL: healthy, alert and no distress  EYES: Eyes grossly normal to inspection, EOMI, pupils equal and round  NECK: no tenderness, no adenopathy, no asymmetry, no masses  RESP: lungs clear to auscultation - no rales, no rhonchi, no " wheezes  BREAST: ~3x6 cm erythematous plaque with well defined borders, tender, without satellite lesions at the crease of the left breast medially. Otherwise, no masses, no tenderness, no nipple discharge, no palpable axillary masses or adenopathy.   CV: regular rates and rhythm, normal S1 S2, no S3 or S4 and no murmur, no click or rub -  ABDOMEN: obese, soft, no tenderness, no  hepatosplenomegaly, no masses, normal bowel sounds  MS: extremities- no gross deformities noted, no edema  SKIN: rash described in breast section  NEURO: strength and tone- normal, sensory exam- grossly normal, mentation- intact, speech- normal   PSYCH: Alert; speech- coherent , normal rate and volume; able to articulate logical thoughts, able to abstract reason, no tangential thoughts, affect- normal. PHQ-2 score is 0.  LYMPHATICS: ant. cervical- normal, post. cervical- normal, supraclavicular- normal     ASSESSMENT:  Health Care Maintenance: Physical Exam    Fatigue  DDx sleep apnea, anemia, hypothyroidism, or depression. Although PHQ-2 score is 0. Unlikely infection, neurological, cardiovascular, rheumatological or pharmacologic. Will schedule another appointment to delve deeper. Patient wants to do labs next visit: CBC to evaluate Hgb and TSH.   - Sleep study referral     Left breast rash  Most likely candida. Other ddx includes contact dermatitis or erythrasma. Will recheck next visit. If no improvement, will use wood lamp to assess for erythrasma.   - Start Nystatin powder    STD testing  Patient wants to give urine sample today for G/C and blood draw next visit for HIV and syphilis, if patient still wants testing.   - G/C urine     Morbid obesity with BMI 41.32  - Encourage healthy diet choices  - Recommend 150 minutes of exercise per week   - A1c with next lab draw    Contraception  Discussed the patch may not be effective at preventing pregnancy with a BMI >30. Patient trusts the patch as this has worked for decades. Does not want  Depo-provera because she gained weight on it previous. No oral pills because she may forget to take them, and thinks IUD's are too invasive. Will return to this discussion next visit, and provided a website to look at options for contraception.   - www.bedsider.org/methods    Tobacco use - 1 pack/week  - Advised smoking cessation    Follow up on 03/19/2021 for fatigue, left breast rash, and contraception. Will do blood work with next visit.     Melissa Morrison, MS4    I was present with the medical student who participated in the service and in the documentation of the note. I have verified the history and personally performed the physical exam and medical decision making, and have verified the content of the note, which accurately reflects my assessment of the patient and the plan of care    Options for treatment and follow-up care were reviewed with the patient and/or guardian. Pt and/or guardian engaged in the decision making process and verbalized understanding of the options discussed and agreed with the final plan.    Precepted today with: MD Magdaleno Arreguin MD, MPH (PGY 3)  [facility] Family [facility] Resident  Pager: [phone]

## 2021-02-23 LAB
C TRACH RRNA SPEC QL NAA+PROBE: NEGATIVE
N GONORRHOEA RRNA SPEC QL NAA+PROBE: NEGATIVE
TREPONEMA ANTIBODY (SYPHILIS): NEGATIVE

## 2021-02-23 NOTE — PROGRESS NOTES
Preceptor Attestation:  Patient's case reviewed and discussed with the resident, CARLIE HAYES MD, and I personally evaluated the patient. I agree with written assessment and plan of care.    Supervising Physician:  Abbey Neville MD   [facility]

## 2021-03-10 RX ORDER — LABETALOL 100 MG/1
100 TABLET, FILM COATED ORAL 2 TIMES DAILY
Qty: 30 TABLET | Refills: 11 | Status: SHIPPED | OUTPATIENT
Start: 2021-03-10 | End: 2022-04-28

## 2021-03-19 ENCOUNTER — OFFICE VISIT (OUTPATIENT)
Dept: FAMILY MEDICINE | Facility: CLINIC | Age: 36
End: 2021-03-19
Payer: COMMERCIAL

## 2021-03-19 VITALS
WEIGHT: 256.4 LBS | SYSTOLIC BLOOD PRESSURE: 146 MMHG | DIASTOLIC BLOOD PRESSURE: 93 MMHG | OXYGEN SATURATION: 96 % | BODY MASS INDEX: 40.24 KG/M2 | RESPIRATION RATE: 22 BRPM | TEMPERATURE: 98 F | HEIGHT: 67 IN | HEART RATE: 75 BPM

## 2021-03-19 DIAGNOSIS — Z87.898 HISTORY OF SNORING: ICD-10-CM

## 2021-03-19 DIAGNOSIS — R53.83 FATIGUE, UNSPECIFIED TYPE: Primary | ICD-10-CM

## 2021-03-19 PROCEDURE — 99213 OFFICE O/P EST LOW 20 MIN: CPT | Mod: GC | Performed by: STUDENT IN AN ORGANIZED HEALTH CARE EDUCATION/TRAINING PROGRAM

## 2021-03-19 ASSESSMENT — ANXIETY QUESTIONNAIRES
GAD7 TOTAL SCORE: 3
3. WORRYING TOO MUCH ABOUT DIFFERENT THINGS: SEVERAL DAYS
6. BECOMING EASILY ANNOYED OR IRRITABLE: SEVERAL DAYS
5. BEING SO RESTLESS THAT IT IS HARD TO SIT STILL: NOT AT ALL
7. FEELING AFRAID AS IF SOMETHING AWFUL MIGHT HAPPEN: NOT AT ALL
2. NOT BEING ABLE TO STOP OR CONTROL WORRYING: SEVERAL DAYS
IF YOU CHECKED OFF ANY PROBLEMS ON THIS QUESTIONNAIRE, HOW DIFFICULT HAVE THESE PROBLEMS MADE IT FOR YOU TO DO YOUR WORK, TAKE CARE OF THINGS AT HOME, OR GET ALONG WITH OTHER PEOPLE: SOMEWHAT DIFFICULT
1. FEELING NERVOUS, ANXIOUS, OR ON EDGE: NOT AT ALL

## 2021-03-19 ASSESSMENT — MIFFLIN-ST. JEOR: SCORE: 1872.71

## 2021-03-19 ASSESSMENT — PATIENT HEALTH QUESTIONNAIRE - PHQ9
SUM OF ALL RESPONSES TO PHQ QUESTIONS 1-9: 0
5. POOR APPETITE OR OVEREATING: NOT AT ALL

## 2021-03-19 NOTE — PROGRESS NOTES
"    Assessment & Plan     Fatigue, unspecified type  History of snoring  Fatigue has improved. Ning has not been told that she stops breathing while snoring. She prefers to monitor her snoring and will consider sleep study in the future.    Plan:  -Encouraged to follow-up if symptoms worsen or fail to improve by calling the clinic/going to nearest health facility.      Review of external notes as documented elsewhere in note       Tobacco Cessation:   reports that she has been smoking cigarettes. She has never used smokeless tobacco.      BMI:   Estimated body mass index is 40.76 kg/m  as calculated from the following:    Height as of this encounter: 1.689 m (5' 6.5\").    Weight as of this encounter: 116.3 kg (256 lb 6.4 oz).   Weight management plan: Discussed healthy diet and exercise guidelines      Return if symptoms worsen or fail to improve.    [facility]    Options for treatment and follow-up care were reviewed with the patient and/or guardian. Pt and/or guardian engaged in the decision making process and verbalized understanding of the options discussed and agreed with the final plan.    Precepted today with: MD Magdaleno Arreguin MD, MPH (PGY 3)  [facility] Family [facility] Resident  Pager: [phone]          Subjective   Ning is a 35 year old who presents for the following health issues     HPI     Chief Complaint   Patient presents with     Fatigue     Follow-up       Fatigue    Onset: Intermittent since February 2021. Improving today. Going to bed earlier. Ordered pillows to help her neck shoulder pain. Thinks fatigue could have been coming from stressful life events, which have improved    LNMP: currently on Xulane patch, tolerating    Description: How severe is the fatigue? mild    Does the fatigue interfere with your life: minimal     How much sleep are you getting? 7 Hours     How much sleep do you normally need to " "feel well? 7 Hours  Exercise: moderate regular exercise program, 45 min on elliptical 3x  Are there episodes of normal energy levels: yes     Accompanying Signs & Symptoms:  Falling asleep during the day: no  Snoring: sometimes  Do you stop breathing while sleeping: no                 Night sweats: no      Fevers:no  Chest Pain: no  Pain other places in the body? no  Change in appetite: no                 Weight gain/loss: not really  Dark or bloody stools: no  Heavy periods (women) no  Substance use:             Caffeine use:no              Alcohol use: Less than 1 beverage / month               Illicit drug use:None    Mood  Depressed mood: no  Any new anxiety/stressors:no  Any new deaths or losses? no  PHQ-9 SCORE 7/15/2020 9/16/2020 3/19/2021   PHQ-9 Total Score 13 11 0     Today: NO SI or HI  PHQ 9 = 0 ; GAD7 = 3      Review of Systems   Constitutional, HEENT, cardiovascular, pulmonary, gi and gu systems are negative, except as otherwise noted.      Objective    BP (!) 146/93   Pulse 75   Temp 98  F (36.7  C) (Oral)   Resp 22   Ht 1.689 m (5' 6.5\")   Wt 116.3 kg (256 lb 6.4 oz)   SpO2 96%   BMI 40.76 kg/m    Body mass index is 40.76 kg/m .  Physical Exam     GENERAL: Healthy, alert and no distress  EYES: Eyes grossly normal to inspection, conjunctivae and sclerae normal  RESP: no audible wheeze, cough, or visible cyanosis.  No visible retractions or increased work of breathing.  Able to speak fully in complete sentences.  NEURO: Cranial nerves grossly intact, mentation intact and speech normal  PSYCH: mentation appears normal, affect normal/bright, judgement and insight intact, normal speech and appearance well-groomed        ----- Service Performed and Documented by Resident or Fellow ------        "

## 2021-03-20 ASSESSMENT — ANXIETY QUESTIONNAIRES: GAD7 TOTAL SCORE: 3

## 2021-04-09 ENCOUNTER — OFFICE VISIT (OUTPATIENT)
Dept: FAMILY MEDICINE | Facility: CLINIC | Age: 36
End: 2021-04-09
Payer: COMMERCIAL

## 2021-04-09 VITALS
DIASTOLIC BLOOD PRESSURE: 82 MMHG | HEIGHT: 67 IN | SYSTOLIC BLOOD PRESSURE: 124 MMHG | BODY MASS INDEX: 40.18 KG/M2 | TEMPERATURE: 98.6 F | OXYGEN SATURATION: 100 % | RESPIRATION RATE: 22 BRPM | WEIGHT: 256 LBS | HEART RATE: 87 BPM

## 2021-04-09 DIAGNOSIS — Z11.3 ROUTINE SCREENING FOR STI (SEXUALLY TRANSMITTED INFECTION): ICD-10-CM

## 2021-04-09 DIAGNOSIS — Z72.51 UNPROTECTED SEX: Primary | ICD-10-CM

## 2021-04-09 DIAGNOSIS — Z30.45 ENCOUNTER FOR SURVEILLANCE OF TRANSDERMAL PATCH HORMONAL CONTRACEPTIVE DEVICE: ICD-10-CM

## 2021-04-09 LAB — HIV 1+2 AB+HIV1 P24 AG SERPL QL IA: NEGATIVE

## 2021-04-09 PROCEDURE — 36415 COLL VENOUS BLD VENIPUNCTURE: CPT | Performed by: STUDENT IN AN ORGANIZED HEALTH CARE EDUCATION/TRAINING PROGRAM

## 2021-04-09 PROCEDURE — 99214 OFFICE O/P EST MOD 30 MIN: CPT | Mod: GC | Performed by: STUDENT IN AN ORGANIZED HEALTH CARE EDUCATION/TRAINING PROGRAM

## 2021-04-09 ASSESSMENT — MIFFLIN-ST. JEOR: SCORE: 1871.45

## 2021-04-09 NOTE — PROGRESS NOTES
CHIEF COMPLAINT                                                      Chief Complaint   Patient presents with     Sinus Problem     Medication Reconciliation     STD     no sx unprotect sex- primarily GC     Medication Request     patch      SUBJECTIVE:                                                    Ning Giraldo is a 35 year old year old female who presents to clinic today for the following health issues:      Concern for STI      Duration: Former partner called and said that he had tested positive when he was admitted to snf.      Description (location/character/radiation): Denies any active symptoms with this.      Accompanying signs and symptoms: N/A    History (similar episodes/previous evaluation): Not recently    Of note, patient does note that her last period was a little irregular and lasted two weeks (though the flow was reassuring).       Patient is an established patient of this clinic.  ----------------------------------------------------------------------------------------------------------------------  Patient Active Problem List   Diagnosis     History of snoring     Morbid obesity (H)     Acquired pes planus of both feet     Personal history of tobacco use     Past Surgical History:   Procedure Laterality Date     NO HISTORY OF SURGERY         Social History     Tobacco Use     Smoking status: Current Some Day Smoker     Types: Cigarettes     Smokeless tobacco: Never Used     Tobacco comment: Mainly on the weekends when drinking, about one pack per week.    Substance Use Topics     Alcohol use: Yes     Alcohol/week: 2.0 standard drinks     Types: 2 Standard drinks or equivalent per week     Frequency: 2-4 times a month     Binge frequency: Weekly     Family History   Problem Relation Age of Onset     Colon Cancer Maternal Grandmother 65     Heart Failure Mother      Liver Disease Mother         hepatitis c     Hypertension Mother         living     Coronary Artery Disease Mother      Heart  "Failure Father         living     Coronary Artery Disease Father      Pacemaker Father      Prostate Cancer Father      Myocardial Infarction Maternal Aunt         2 aunts  in 50's      Ovarian Cancer Sister          at age 40     Diabetes No family hx of      Cerebrovascular Disease No family hx of      Thyroid Disease No family hx of      Breast Cancer No family hx of          Problem list and past medical, surgical, social, and family histories reviewed & adjusted, as indicated.    Current Outpatient Medications   Medication Sig Dispense Refill     labetalol (NORMODYNE) 100 MG tablet Take 1 tablet (100 mg) by mouth 2 times daily 30 tablet 11     nystatin (MYCOSTATIN) 237129 UNIT/GM external powder Apply topically daily 60 g 0     order for DME Equipment being ordered: Digital home blood pressure monitor kit 1 Device 0     XULANE 150-35 MCG/24HR patch Place 1 patch onto the skin once a week       Medication list reviewed and updated as indicated.    Allergies   Allergen Reactions     No Known Allergies      Allergies reviewed and updated as indicated.  ----------------------------------------------------------------------------------------------------------------------  ROS:  Constitutional, HEENT, cardiovascular, pulmonary, GI, musculoskeletal, neuro, skin, and psych systems are negative, except as otherwise noted.    OBJECTIVE:     /82 (BP Location: Right arm, Patient Position: Sitting, Cuff Size: Adult Large)   Pulse 87   Temp 98.6  F (37  C) (Tympanic)   Resp 22   Ht 1.69 m (5' 6.54\")   Wt 116.1 kg (256 lb)   SpO2 100%   BMI 40.66 kg/m    Body mass index is 40.66 kg/m .  Exam:  Constitutional: healthy, alert and no distress  Head: Normocephalic. No masses, lesions, tenderness or abnormalities  Cardiovascular: negative, PMI normal. No lifts, heaves, or thrills. RRR. No murmurs, clicks gallops or rub  Respiratory: negative, Percussion normal. Good diaphragmatic excursion. Lungs " clear  Gastrointestinal: Abdomen soft, non-tender. BS normal. No masses, organomegaly  Musculoskeletal: extremities normal- no gross deformities noted, gait normal and normal muscle tone  Skin: no suspicious lesions or rashes  Neurologic: Gait normal. Reflexes normal and symmetric. Sensation grossly WNL.    Diagnostic Test Results:  Results for orders placed or performed in visit on 04/09/21   Chlamydia/Gono Amplified ([facility])     Status: None   Result Value Ref Range    Chlamydia trac,Amplified Prb Negative Negative    N gonorrhoeae,Amplified Prb Negative Negative    Narrative    Test performed by:  [facility] [address] [place] [address]   Syphilis Screen Bath ([facility])     Status: None   Result Value Ref Range    Treponema Antibody (Syphilis) Negative Negative    Narrative    Test performed by:  [facility] [address] [place] [address]   HIV Ag/Ab Screen Bath ([facility])     Status: None   Result Value Ref Range    HIV Antigen/Antibody Negative Negative    Narrative    Test performed by:  [facility] [address] [place] [address]  Method is Abbott HIV Ag/Ab for the detection of HIV p24 antigen, HIV-1   antibodies and HIV-2 antibodies.       ASSESSMENT/PLAN:     (Z72.51) Unprotected sex  (primary encounter diagnosis)  (Z11.3) Routine screening for STI (sexually transmitted infection)  -Former partner just went to detention, had STI screening and one test came back positive  -Patient was informed she should go and get checked  -Has not had intercourse with this person for several weeks, has not noticed any symptoms  -Cannot recall what her partner had said he had  -She was not sent expedited partner therapy   -Patient was unable to get a pelvic exam today  -Workup thus far has been negative  -We will follow up with patient if she develops any symptoms.  [facility] consider rechecking  hepatitis labs or a wet prep at her next visit  -Somewhat reassuring patient does not have symptoms   -Will return with any questions or concerns.      (Z30.45) Encounter for surveillance of transdermal patch hormonal contraceptive device  -Patient here requesting refill of patch  -Will continue one patch weekly with one off week a month  -Had some issues with bleeding last month where her period was longer than usual.     -Will return sooner with any questions or concerns.      There are no discontinued medications.    Options for treatment and follow-up care were reviewed with the patient and/or guardian. Ning Giraldo and/or guardian engaged in the decision making process and verbalized understanding of the options discussed and agreed with the final plan    Precepted with Dr. Sandoval.    Romaine John MD on 4/9/2021 at 4:59 PM

## 2021-04-12 RX ORDER — NORELGESTROMIN AND ETHINYL ESTRADIOL 150; 35 UG/D; UG/D
1 PATCH TRANSDERMAL WEEKLY
Qty: 3 PATCH | Refills: 12 | Status: SHIPPED | OUTPATIENT
Start: 2021-04-12 | End: 2022-07-08

## 2021-04-12 NOTE — RESULT ENCOUNTER NOTE
Dear Ning,     Here are your recent results.     There are no signs of any sexually transmitted infections on our performed lab workup.  It may be beneficial to try to clarify with your previous partner what they had told him he had.      There are a couple less frequent tests we could also check, but at this time it appears you do not have an STI. Please feel free to follow up if you develop any symptoms or as needed.      Please let us know if you have any questions or concerns.    Regards,  Romaien John MD

## 2021-04-22 ENCOUNTER — TELEPHONE (OUTPATIENT)
Dept: FAMILY MEDICINE | Facility: CLINIC | Age: 36
End: 2021-04-22

## 2021-04-22 DIAGNOSIS — Z20.822 EXPOSURE TO 2019 NOVEL CORONAVIRUS: ICD-10-CM

## 2021-04-22 DIAGNOSIS — Z20.822 EXPOSURE TO 2019 NOVEL CORONAVIRUS: Primary | ICD-10-CM

## 2021-04-22 LAB
SARS-COV-2 RNA RESP QL NAA+PROBE: NORMAL
SPECIMEN SOURCE: NORMAL

## 2021-04-22 PROCEDURE — 99207 PR NO BILLABLE SERVICE THIS VISIT: CPT

## 2021-04-22 NOTE — TELEPHONE ENCOUNTER
[facility] phone call message- general phone call:    Reason for call: Patient state job wants her to get covid test as her coworker tested positive for covid19 on Sunday 4/18 and the last date patient work with this coworker was on 4/16. Asymptomatic    Return call needed: Yes    OK to leave a message on voice mail? Yes    Primary language: English      needed? No    Call taken on April 22, 2021 at 11:14 AM by Abdiaziz Buckley

## 2021-04-23 LAB
LABORATORY COMMENT REPORT: NORMAL
SARS-COV-2 RNA RESP QL NAA+PROBE: NEGATIVE
SPECIMEN SOURCE: NORMAL

## 2021-06-21 ENCOUNTER — MEDICAL CORRESPONDENCE (OUTPATIENT)
Dept: HEALTH INFORMATION MANAGEMENT | Facility: CLINIC | Age: 36
End: 2021-06-21

## 2021-07-21 ENCOUNTER — OFFICE VISIT (OUTPATIENT)
Dept: FAMILY MEDICINE | Facility: CLINIC | Age: 36
End: 2021-07-21
Payer: COMMERCIAL

## 2021-07-21 VITALS
TEMPERATURE: 97.7 F | BODY MASS INDEX: 40.18 KG/M2 | OXYGEN SATURATION: 99 % | SYSTOLIC BLOOD PRESSURE: 128 MMHG | HEART RATE: 75 BPM | DIASTOLIC BLOOD PRESSURE: 80 MMHG | WEIGHT: 250 LBS | HEIGHT: 66 IN | RESPIRATION RATE: 20 BRPM

## 2021-07-21 DIAGNOSIS — N64.3 GALACTORRHEA OF BOTH BREASTS: Primary | ICD-10-CM

## 2021-07-21 DIAGNOSIS — Z12.39 ENCOUNTER FOR SCREENING BREAST EXAMINATION: ICD-10-CM

## 2021-07-21 DIAGNOSIS — R40.0 DROWSINESS: ICD-10-CM

## 2021-07-21 LAB
HCG SERPL-ACNC: <2 MLU/ML (ref 0–4)
PROLACTIN SERPL-MCNC: 37.6 NG/ML (ref 0–20)
TSH SERPL DL<=0.005 MIU/L-ACNC: 1.28 UIU/ML (ref 0.3–5)

## 2021-07-21 PROCEDURE — 84702 CHORIONIC GONADOTROPIN TEST: CPT | Performed by: STUDENT IN AN ORGANIZED HEALTH CARE EDUCATION/TRAINING PROGRAM

## 2021-07-21 PROCEDURE — 99213 OFFICE O/P EST LOW 20 MIN: CPT | Mod: GC | Performed by: STUDENT IN AN ORGANIZED HEALTH CARE EDUCATION/TRAINING PROGRAM

## 2021-07-21 PROCEDURE — 84146 ASSAY OF PROLACTIN: CPT | Performed by: STUDENT IN AN ORGANIZED HEALTH CARE EDUCATION/TRAINING PROGRAM

## 2021-07-21 PROCEDURE — 36415 COLL VENOUS BLD VENIPUNCTURE: CPT | Performed by: STUDENT IN AN ORGANIZED HEALTH CARE EDUCATION/TRAINING PROGRAM

## 2021-07-21 PROCEDURE — 84443 ASSAY THYROID STIM HORMONE: CPT | Performed by: STUDENT IN AN ORGANIZED HEALTH CARE EDUCATION/TRAINING PROGRAM

## 2021-07-21 ASSESSMENT — MIFFLIN-ST. JEOR: SCORE: 1831.74

## 2021-07-21 NOTE — PROGRESS NOTES
"Assessment & Plan     Encounter for screening breast examination  - Adult Genetics & Metabolism Referral   Patient has sister who passed from ovarian cancer and extensive family history of other cancers (colon, prostate).   - HA risk model     Mammograms starting at 40 based on NCCN guidelines for breast cancer screening.    Galactorrhea of both breasts, likely physiologic  - TSH  - Prolactin  - Beta-HCG Quantitative    Daytime drowsiness  - SLEEP EVALUATION & MANAGEMENT REFERRAL - ADULT       Jael Blanco MD  [facility] Subjective   Ning is a 35 year old who presents for the following health issues:    HPI   Nipple Discharge  - Noticed bilateral nipple discharge x 1 week, milky with massage. Hasn't  for over a year and a half. Not constant. No bloody nipple discharge.  - Hasn't felt any new lumps.  - No history of mammograms. No previous breast biopsies or procedures.  - Sister passed away from ovarian cancer. 1 year from diagnosis (39 yo). No family history of breast cancer. Family history   - Sexually active but has been on and off contraception.     Daytime Drowsiness  - often snores at night, feels fatigued and sleepy in morning. Had a sleep study referral but lapsed. Interested in doing that now.    Preventive Care  - Wants to read more about COVID vaccine and will discuss next time.      Review of Systems   Constitutional, HEENT, cardiovascular, pulmonary, gi and gu systems are negative, except as otherwise noted.      Objective    /80 (BP Location: Right arm, Patient Position: Sitting, Cuff Size: Adult Large)   Pulse 75   Temp 97.7  F (36.5  C) (Oral)   Resp 20   Ht 1.67 m (5' 5.75\")   Wt 113.4 kg (250 lb)   LMP 07/12/2021   SpO2 99%   BMI 40.66 kg/m    Body mass index is 40.66 kg/m .  Physical Exam   GENERAL: healthy, alert and no distress  NECK: no adenopathy, no asymmetry, masses, or scars and thyroid normal to palpation  RESP: lungs clear to " auscultation - no rales, rhonchi or wheezes  BREAST: dense, ptotic breasts without masses, tenderness or nipple discharge and no palpable axillary masses or adenopathy,  CV: regular rate and rhythm, normal S1 S2, no S3 or S4, no murmur, click or rub, no peripheral edema and peripheral pulses strong  ABDOMEN: soft, nontender, no hepatosplenomegaly, no masses and bowel sounds normal  MS: no gross musculoskeletal defects noted, no edema      ----- Service Performed and Documented by Resident or Fellow ------

## 2021-07-22 NOTE — PATIENT INSTRUCTIONS
Genetic faxed over to [facility], they will call patient. P: [phone]

## 2021-07-22 NOTE — PROGRESS NOTES
Preceptor Attestation:  Patient's case reviewed and discussed with Jael Blanco MD resident and I evaluated the patient. I agree with written assessment and plan of care.  Supervising Physician:  Yuri Sagastume MD, MD [facility]

## 2021-09-08 ENCOUNTER — OFFICE VISIT (OUTPATIENT)
Dept: FAMILY MEDICINE | Facility: CLINIC | Age: 36
End: 2021-09-08
Payer: COMMERCIAL

## 2021-09-08 VITALS
RESPIRATION RATE: 20 BRPM | HEART RATE: 72 BPM | DIASTOLIC BLOOD PRESSURE: 89 MMHG | OXYGEN SATURATION: 99 % | SYSTOLIC BLOOD PRESSURE: 139 MMHG | TEMPERATURE: 97.4 F

## 2021-09-08 DIAGNOSIS — Z20.822 ENCOUNTER FOR LABORATORY TESTING FOR COVID-19 VIRUS: Primary | ICD-10-CM

## 2021-09-08 PROCEDURE — 99213 OFFICE O/P EST LOW 20 MIN: CPT | Mod: CS | Performed by: STUDENT IN AN ORGANIZED HEALTH CARE EDUCATION/TRAINING PROGRAM

## 2021-09-08 PROCEDURE — U0005 INFEC AGEN DETEC AMPLI PROBE: HCPCS | Performed by: STUDENT IN AN ORGANIZED HEALTH CARE EDUCATION/TRAINING PROGRAM

## 2021-09-08 PROCEDURE — U0003 INFECTIOUS AGENT DETECTION BY NUCLEIC ACID (DNA OR RNA); SEVERE ACUTE RESPIRATORY SYNDROME CORONAVIRUS 2 (SARS-COV-2) (CORONAVIRUS DISEASE [COVID-19]), AMPLIFIED PROBE TECHNIQUE, MAKING USE OF HIGH THROUGHPUT TECHNOLOGIES AS DESCRIBED BY CMS-2020-01-R: HCPCS | Performed by: STUDENT IN AN ORGANIZED HEALTH CARE EDUCATION/TRAINING PROGRAM

## 2021-09-08 NOTE — PROGRESS NOTES
Assessment and Plan     (Z20.822) Encounter for laboratory testing for COVID-19 virus  (primary encounter diagnosis)  Comment: Patient with headache since the weekend, was recently around many people while not wearing the mask. Denies fevers, does endorse a mild cough. Is not vaccinated against COVID.   Plan: Symptomatic COVID-19 Virus (Coronavirus) by PCR    Options for treatment and follow-up care were reviewed with the patient and/or guardian. Ning Giraldo and/or guardian engaged in the decision making process and verbalized understanding of the options discussed and agreed with the final plan.    Jamila Vitale MD      Precepted today with: Romaine Land MD           HPI:       Ning Giraldo is a 35 year old  female without a significant past medical history for presents for the following below:    Headache  - Left sided headache  - Started over the weekend  - Endorses some nausea, back pain, small cough  - Denies any fevers or myalgias, vision changes   - No known COVID exposures  - Works in healthcare facility   - Has been taking allergy medications and Tylenol which has not been helpful   - Never has had COVID and is not vaccinated            PMHX:     Patient Active Problem List   Diagnosis     History of snoring     Morbid obesity (H)     Acquired pes planus of both feet     Personal history of tobacco use       Current Outpatient Medications   Medication Sig Dispense Refill     labetalol (NORMODYNE) 100 MG tablet Take 1 tablet (100 mg) by mouth 2 times daily 30 tablet 11     nystatin (MYCOSTATIN) 459350 UNIT/GM external powder Apply topically daily 60 g 0     order for DME Equipment being ordered: Digital home blood pressure monitor kit 1 Device 0     XULANE 150-35 MCG/24HR patch Place 1 patch onto the skin once a week (Patient not taking: Reported on 7/21/2021) 3 patch 12       Social History     Tobacco Use     Smoking status: Current Some Day Smoker     Types: Cigarettes     Smokeless tobacco:  Never Used     Tobacco comment: Mainly on the weekends when drinking, about one pack per week.    Substance Use Topics     Alcohol use: Yes     Alcohol/week: 2.0 standard drinks     Types: 2 Standard drinks or equivalent per week     Comment: on the weekends     Drug use: No          Allergies   Allergen Reactions     No Known Allergies        No results found for this or any previous visit (from the past 24 hour(s)).         Review of Systems:     10 point ROS negative except for what is noted in HPI          Physical Exam:     Vitals:    09/08/21 1620   BP: 139/89   Pulse: 72   Resp: 20   Temp: 97.4  F (36.3  C)   TempSrc: Oral   SpO2: 99%     There is no height or weight on file to calculate BMI.      GENERAL APPEARANCE: healthy, alert and no distress,  EYES: Eyes grossly normal to inspection  MS: extremities normal- no gross deformities noted  SKIN: no suspicious lesions or rashes  NEURO: Normal strength and tone, sensory exam grossly normal, mentation appears intact and speech normal  PSYCH: mood and affect normal/bright

## 2021-09-09 LAB — SARS-COV-2 RNA RESP QL NAA+PROBE: NEGATIVE

## 2021-10-03 ENCOUNTER — HEALTH MAINTENANCE LETTER (OUTPATIENT)
Age: 36
End: 2021-10-03

## 2022-03-09 NOTE — TELEPHONE ENCOUNTER
[facility] phone call message- medication clarification/question:    Full Medication Name: azithromycin (ZITHROMAX)    Dose: 500 MG tablet    Question: Pt stated that per Pharmacy to change her med to take 4 tabs instead of 2 tabs in order for her ins to cover. Otherwise her ins will not cover for 2 tabs. Pt would like a call back once med with 4 tab is sent. Notify it may take 2 business days.    Pharmacy confirmed as [id number] - [place], [place] - [address] AT [facility]: Yes    OK to leave a message on voice mail? Yes    Primary language: English      needed? No    Call taken on August 4, 2020 at 11:32 AM by Yari Olivas     Mastoid Interpolation Flap Text: A decision was made to reconstruct the defect utilizing an interpolation axial flap and a staged reconstruction.  A telfa template was made of the defect.  This telfa template was then used to outline the mastoid interpolation flap.  The donor area for the pedicle flap was then injected with anesthesia.  The flap was excised through the skin and subcutaneous tissue down to the layer of the underlying musculature.  The pedicle flap was carefully excised within this deep plane to maintain its blood supply.  The edges of the donor site were undermined.   The donor site was closed in a primary fashion.  The pedicle was then rotated into position and sutured.  Once the tube was sutured into place, adequate blood supply was confirmed with blanching and refill.  The pedicle was then wrapped with xeroform gauze and dressed appropriately with a telfa and gauze bandage to ensure continued blood supply and protect the attached pedicle.

## 2022-04-28 RX ORDER — LABETALOL 100 MG/1
100 TABLET, FILM COATED ORAL 2 TIMES DAILY
Qty: 60 TABLET | Refills: 0 | Status: SHIPPED | OUTPATIENT
Start: 2022-04-28 | End: 2022-05-31

## 2022-04-28 NOTE — TELEPHONE ENCOUNTER
Short term refill.  Unclear why she wants this again.   Will send patient message to understand more    Dr. Waite

## 2022-05-15 ENCOUNTER — HEALTH MAINTENANCE LETTER (OUTPATIENT)
Age: 37
End: 2022-05-15

## 2022-05-16 ENCOUNTER — OFFICE VISIT (OUTPATIENT)
Dept: FAMILY MEDICINE | Facility: CLINIC | Age: 37
End: 2022-05-16
Payer: COMMERCIAL

## 2022-05-16 VITALS
DIASTOLIC BLOOD PRESSURE: 84 MMHG | SYSTOLIC BLOOD PRESSURE: 137 MMHG | HEIGHT: 66 IN | WEIGHT: 271 LBS | OXYGEN SATURATION: 98 % | BODY MASS INDEX: 43.55 KG/M2 | HEART RATE: 67 BPM | RESPIRATION RATE: 20 BRPM | TEMPERATURE: 98 F

## 2022-05-16 DIAGNOSIS — M25.562 CHRONIC PAIN OF LEFT KNEE: Primary | ICD-10-CM

## 2022-05-16 DIAGNOSIS — M17.12 OSTEOARTHRITIS OF LEFT KNEE, UNSPECIFIED OSTEOARTHRITIS TYPE: ICD-10-CM

## 2022-05-16 DIAGNOSIS — G89.29 CHRONIC PAIN OF LEFT KNEE: Primary | ICD-10-CM

## 2022-05-16 PROCEDURE — 99214 OFFICE O/P EST MOD 30 MIN: CPT | Performed by: FAMILY MEDICINE

## 2022-05-16 NOTE — PROGRESS NOTES
"       HPI:   Ning Giraldo is a 36 year old  female who presents for:    Chief Complaint   Patient presents with     Musculoskeletal Problem     Left knee and foot started hurting about a year ago. Feels weak. The pain is getting worse and is worried that will not have ability to continue working. \"Feels like walking on bones\"     Greater than 5 years of left knee pain.  Aching pain at anterior medial knee. Worse with prolonged standing or walking.  Sometimes feels unstable when she has to pivot or move quickly.  Sometimes feels like knee cap is sliding out of place. Has never seen it displaced, but feels lit it.  No swelling or redness. No known previous knee injury.  No history of knee surgery or MRI.    Family history of osteoarthritis of knees, mother had a knee replacement in her 40's.           PMHX:     Patient Active Problem List   Diagnosis     History of snoring     Morbid obesity (H)     Acquired pes planus of both feet     Personal history of tobacco use       Current Outpatient Medications   Medication Sig Dispense Refill     nystatin (MYCOSTATIN) 148716 UNIT/GM external powder Apply topically daily 60 g 0     labetalol (NORMODYNE) 100 MG tablet Take 1 tablet (100 mg) by mouth 2 times daily 60 tablet 0     order for DME Equipment being ordered: Digital home blood pressure monitor kit 1 Device 0     XULANE 150-35 MCG/24HR patch Place 1 patch onto the skin once a week (Patient not taking: Reported on 7/21/2021) 3 patch 12       Social History     Tobacco Use     Smoking status: Current Some Day Smoker     Types: Cigarettes     Smokeless tobacco: Never Used     Tobacco comment: Mainly on the weekends when drinking, about one pack per week.    Substance Use Topics     Alcohol use: Yes     Alcohol/week: 2.0 standard drinks     Types: 2 Standard drinks or equivalent per week     Comment: on the weekends     Drug use: No       Social History     Social History Narrative    Children: Jayesh Whiteside 6yo, Tyrses " "Stevo 15yo       Allergies   Allergen Reactions     No Known Allergies        No results found for this or any previous visit (from the past 24 hour(s)).         Review of Systems:     General no fevers  ENT: No upper respiratory symptoms.  COVID screening questions are negative  Neurologic: No strength changes           Physical Exam:     Vitals:    05/16/22 1558   BP: 137/84   Pulse: 67   Resp: 20   Temp: 98  F (36.7  C)   SpO2: 98%   Weight: 122.9 kg (271 lb)   Height: 1.67 m (5' 5.75\")     Body mass index is 44.07 kg/m .    General: Alert, in no acute distress  HEENT: Head is free of trauma.   Sclerae non-icteric. PERRL, Moist oral mucus membranes:  Resp: Clear to auscultation bilaterally  CV: Regular rate and rhythm  Ext: Left knee: No effusion.  No erythema.  Mild tenderness at the medial proximal tibia.  Range of motion intact.  Normal muscle bulk and tone.  Test for ligamentous laxity are negative.     Skin: exposed skin free of rash  Psych: Mood appropriate       I personally reviewed X-ray today without fracture and preserved joint space.   Mild DJD changes.    Results for orders placed or performed in visit on 05/16/22   XR Knee Left 1/2 Views     Status: None    Narrative    EXAM: XR KNEE LT 1/2 VW  LOCATION: [facility]  DATE/TIME: 5/16/2022 4:36 PM    INDICATION:  Chronic pain of left knee, Chronic pain of left knee  COMPARISON: None.      Impression    IMPRESSION: Mild lateral compartment degenerative arthritis. No fracture. Moderate effusion.         Assessment and Plan   1. Chronic pain of left knee  Knee pain secondary to early osteoarthritis  We discussed the natural history of osteoarthritis  Preventative measures including regular exercise, preferably nonweightbearing  Pain control measures including ice massage and Tylenol or ibuprofen as outlined in the after visit summary    Physical therapy assessment and strengthening and stabilization exercises.  Physical " therapy referral was sent today, patient will be contacted to schedule.    Follow-up in 3 weeks after initiating physical therapy    - XR Knee Left 1/2 Views; Future  - Physical Therapy Referral; Future    2. Osteoarthritis of left knee, unspecified osteoarthritis type  As above  - Physical Therapy Referral; Future      Follow up: 3 weeks  Options for treatment and follow-up care were reviewed with the patient and/or guardian. Ning CANNON Giraldo and/or guardian engaged in the decision making process and verbalized understanding of the options discussed and agreed with the final plan.    Joel Fortune MD  Faculty - [facility]

## 2022-05-31 ENCOUNTER — TELEPHONE (OUTPATIENT)
Dept: FAMILY MEDICINE | Facility: CLINIC | Age: 37
End: 2022-05-31

## 2022-05-31 RX ORDER — LABETALOL 100 MG/1
100 TABLET, FILM COATED ORAL 2 TIMES DAILY
Qty: 60 TABLET | Refills: 0 | Status: SHIPPED | OUTPATIENT
Start: 2022-05-31 | End: 2022-07-08

## 2022-05-31 NOTE — TELEPHONE ENCOUNTER
refill request for Labetalol 100mg, last refill shows tempory until Dr Montiel finds out why pt needs.-called pt [phone], lm to cb

## 2022-06-15 ENCOUNTER — HOSPITAL ENCOUNTER (OUTPATIENT)
Dept: PHYSICAL THERAPY | Facility: REHABILITATION | Age: 37
Discharge: HOME | End: 2022-06-15
Attending: FAMILY MEDICINE
Payer: COMMERCIAL

## 2022-06-15 DIAGNOSIS — M25.562 CHRONIC PAIN OF LEFT KNEE: ICD-10-CM

## 2022-06-15 DIAGNOSIS — M17.12 OSTEOARTHRITIS OF LEFT KNEE, UNSPECIFIED OSTEOARTHRITIS TYPE: ICD-10-CM

## 2022-06-15 DIAGNOSIS — G89.29 CHRONIC PAIN OF LEFT KNEE: ICD-10-CM

## 2022-06-15 PROCEDURE — 97110 THERAPEUTIC EXERCISES: CPT | Mod: GP

## 2022-06-15 PROCEDURE — 97161 PT EVAL LOW COMPLEX 20 MIN: CPT | Mod: GP

## 2022-06-16 NOTE — PROGRESS NOTES
[facility]    OUTPATIENT PHYSICAL THERAPY ORTHOPEDIC EVALUATION  PLAN OF TREATMENT FOR OUTPATIENT REHABILITATION  (COMPLETE FOR INITIAL CLAIMS ONLY)  Patient's Last Name, First Name, M.I.  YOB: 1985  Ning Giraldo    Provider s Name:  [facility]   Medical Record No.  [id number]   Start of Care Date:  06/15/22   Onset Date:   6/15/22   Type:     _X__PT   ___OT   ___SLP Medical Diagnosis:  M25.562, G89.29 (ICD-10-CM) - Chronic pain of left knee     PT Diagnosis:  B knee pain, L>R; L knee and hip weakness; decreased L knee ROM   Visits from SOC:  1      _________________________________________________________________________________  Plan of Treatment/Functional Goals:              Goals  Goal Identifier: LEFS  Goal Description: Patient will improve LEFS score by at least 9 points to show improvement in function.  Target Date: 08/25/22    Goal Identifier: Standing  Goal Description: Patient will tolerate a standing position for at least 30 minutes to return to PLOF  Target Date: 08/25/22    Goal Identifier: L knee ROM  Goal Description: Patient will demonstrate improved pain-free L knee ROM to improve functional mobility.  Target Date: 08/25/22                                                           Therapy Frequency:  1 time/week  Predicted Duration of Therapy Intervention:  10 weeks    Scott Parmer, PT                 I CERTIFY THE NEED FOR THESE SERVICES FURNISHED UNDER        THIS PLAN OF TREATMENT AND WHILE UNDER MY CARE     (Physician co-signature of this document indicates review and certification of the therapy plan).                     Certification Date From:  06/15/22   Certification Date To:  08/25/22    Referring Provider:  Joel Fortune MD    Initial Assessment        See Epic Evaluation Start of Care Date: 06/15/22                06/15/22 1400    General Information   Type of Visit Initial OP Ortho PT Evaluation   Start of Care Date 06/15/22   Referring Physician Joel Fortune MD   Orders Evaluate and Treat   Date of Order 05/16/22   Certification Required? Yes   Medical Diagnosis M25.562, G89.29 (ICD-10-CM) - Chronic pain of left knee   Body Part(s)   Body Part(s) Ankle/Foot;Knee;Hip   Presentation and Etiology   Pertinent history of current problem (include personal factors and/or comorbidities that impact the POC) Patient reports B knee pain that has been on and off for years. She notes a familial history of knee pain and her mom has had both knees replaced. Both knees seem to bother her, her left knee seems to be a bit worse than the R. Functionally, she has most difficulty with prolonged walking, stairs down>up, prolonged standing, squatting. 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 She does do light stretching which seems to help as well. Patient feels that sometimes her pain will be so bad that her knees will feel that they will give out. She also complains of neck pain which happened when she was sleeping a few days ago.   Impairments C. Swelling;E. Decreased flexibility;D. Decreased ROM;A. Pain;B. Decreased WB tolerance;F. Decreased strength and endurance;G. Impaired balance   Functional Limitations perform activities of daily living;perform required work activities   Symptom Location B knees L>R   How/Where did it occur From insidious onset;From Degenerative Joint Disease   Chronicity Chronic   Pain rating (0-10 point scale) Best (/10);Worst (/10)   Best (/10) 5   Worst (/10) 10   Pain quality B. Dull;C. Aching   Frequency of pain/symptoms B. Intermittent   Pain/symptoms are: Worse during the day   Pain/symptoms exacerbated by B. Walking;C. Lifting;G. Certain positions;I. Bending   Fall Risk Screen   Fall screen completed by PT   Have you fallen 2 or more times in the past year? No   Have you fallen and  had an injury in the past year? No   Is patient a fall risk? No   Abuse Screen (yes response referral indicated)   Feels Unsafe at Home or Work/School no   Feels Threatened by Someone no   Does Anyone Try to Keep You From Having Contact with Others or Doing Things Outside Your Home? no   Physical Signs of Abuse Present no   System Outcome Measures   Outcome Measures   (LEFS: 40/80)   Ankle/Foot Objective Findings   Foot Position In Standing Pes Planus B   Knee Objective Findings   Gait/Locomotion Genu valgum B, B foot pronation   Balance/Proprioception (Single Leg Stance) Needed UE support B, pain when standing on L   Palpation Tenderness to the medial joint line on the L   Integumentary  No deficits noted   Posture Genu valgum, anterior pelvic tilt   Anterior Drawer Test -   Posterior Drawer Test -   Varus Stress Test -   Valgus Stress Test -   Pancho's Test + L   Apley's Test -   Apprehension Test + L   Side (if bilateral, select both right and left) Right;Left   Knee Special Test Comments Laterally tilted L patella   Right Knee Extension AROM 2 deg hyperextension   Left Knee Extension AROM Lacking 3 deg full ext   Right Knee Flexion AROM WNL   Left Knee Flexion AROM 92 deg with pain at end range   Right Knee Flexion Strength 5/5   Left Knee Flexion Strength 5/5   Right Knee Extension Strength 5/5`   Left Knee Extension Strength 4/5 pain   Right Hip Abduction Strength 4/5   Left Hip Abduction Strength 3+/5 pain   Right Quad Set Strength Completed X 8 SLR   Left Quad Set Strength X 4 SLR with no pain   Clinical Impression   Criteria for Skilled Therapeutic Interventions Met yes, treatment indicated   PT Diagnosis B knee pain, L>R; L knee and hip weakness; decreased L knee ROM   Functional limitations due to impairments Standing, walking, squatting, lifting, bending,   Clinical Presentation Stable/Uncomplicated   Clinical Decision Making (Complexity) Low complexity   Therapy Frequency 1 time/week   Predicted  Duration of Therapy Intervention (days/wks) 10 weeks   Risk & Benefits of therapy have been explained Yes   Patient, Family & other staff in agreement with plan of care Yes   Clinical Impression Comments Patient is a 36 year old female presenting to physical therapy with B knee pain L>R. Functionally, she has difficulty with Standing, walking, squatting, lifting, and bending. She does not have full ROM of the L knee into flexion or extension. She would benefit from skilled PT services to address limitations noted in the IE.   ORTHO GOALS   PT Ortho Eval Goals 1;2;3;4   Ortho Goal 1   Goal Identifier LEFS   Goal Description Patient will improve LEFS score by at least 9 points to show improvement in function.   Target Date 08/25/22   Ortho Goal 2   Goal Identifier Standing   Goal Description Patient will tolerate a standing position for at least 30 minutes to return to PLOF   Target Date 08/25/22   Ortho Goal 3   Goal Identifier L knee ROM   Goal Description Patient will demonstrate improved pain-free L knee ROM to improve functional mobility.   Target Date 08/25/22   Total Evaluation Time   PT Eval, Low Complexity Minutes (74841) 09   Therapy Certification   Certification date from 06/15/22   Certification date to 08/25/22   Medical Diagnosis M25.562, G89.29 (ICD-10-CM) - Chronic pain of left knee     Scott Parmer, PT, DPT

## 2022-06-20 ENCOUNTER — OFFICE VISIT (OUTPATIENT)
Dept: FAMILY MEDICINE | Facility: CLINIC | Age: 37
End: 2022-06-20
Payer: COMMERCIAL

## 2022-06-20 VITALS
SYSTOLIC BLOOD PRESSURE: 137 MMHG | DIASTOLIC BLOOD PRESSURE: 86 MMHG | HEART RATE: 73 BPM | RESPIRATION RATE: 20 BRPM | OXYGEN SATURATION: 100 % | WEIGHT: 270 LBS | TEMPERATURE: 97.8 F | HEIGHT: 65 IN | BODY MASS INDEX: 44.98 KG/M2

## 2022-06-20 DIAGNOSIS — N94.6 DYSMENORRHEA: ICD-10-CM

## 2022-06-20 DIAGNOSIS — Z28.9 COVID-19 VACCINE SERIES NOT COMPLETED: ICD-10-CM

## 2022-06-20 DIAGNOSIS — F33.40 MDD (RECURRENT MAJOR DEPRESSIVE DISORDER) IN REMISSION (H): ICD-10-CM

## 2022-06-20 DIAGNOSIS — Z28.311 COVID-19 VACCINE SERIES NOT COMPLETED: ICD-10-CM

## 2022-06-20 DIAGNOSIS — E66.01 MORBID OBESITY (H): ICD-10-CM

## 2022-06-20 DIAGNOSIS — Z00.01 ANNUAL VISIT FOR GENERAL ADULT MEDICAL EXAMINATION WITH ABNORMAL FINDINGS: Primary | ICD-10-CM

## 2022-06-20 DIAGNOSIS — R40.0 DROWSINESS: ICD-10-CM

## 2022-06-20 PROBLEM — F33.1 MDD (MAJOR DEPRESSIVE DISORDER), RECURRENT EPISODE, MODERATE (H): Status: ACTIVE | Noted: 2022-06-20

## 2022-06-20 PROCEDURE — 99213 OFFICE O/P EST LOW 20 MIN: CPT | Mod: 25 | Performed by: STUDENT IN AN ORGANIZED HEALTH CARE EDUCATION/TRAINING PROGRAM

## 2022-06-20 PROCEDURE — 99395 PREV VISIT EST AGE 18-39: CPT | Mod: GC | Performed by: STUDENT IN AN ORGANIZED HEALTH CARE EDUCATION/TRAINING PROGRAM

## 2022-06-20 ASSESSMENT — ENCOUNTER SYMPTOMS
ARTHRALGIAS: 1
CONSTIPATION: 0
COUGH: 0
HEMATOCHEZIA: 0
NERVOUS/ANXIOUS: 0
PARESTHESIAS: 0
EYE PAIN: 0
HEMATURIA: 0
FREQUENCY: 0
CHILLS: 0
SORE THROAT: 0
MYALGIAS: 1
DIZZINESS: 0
HEARTBURN: 0
FEVER: 0
DIARRHEA: 0
SHORTNESS OF BREATH: 0
BREAST MASS: 0
PALPITATIONS: 0
DYSURIA: 0
WEAKNESS: 0
HEADACHES: 0
ABDOMINAL PAIN: 0
NAUSEA: 0
JOINT SWELLING: 0

## 2022-06-20 NOTE — PROGRESS NOTES
SUBJECTIVE:   PD 37 yo F   hx of obesity, tobacco use, prior chlamydia infection     CC: Ning Giraldo is an 36 year old woman who presents for preventive health visit.     Patient has been advised of split billing requirements and indicates understanding: Yes  HPI    Concerns:     Daytime sleepiness   Fell asleep at work   S - yes   T - yes   O - yes   P - yes   B - yes    A - no   N - yes   G - no     Muscle pain   - Neck and shoulder pain last week   - Leg pain - doing PT for that   - Improved with advil - that's been helping     Pelvic pain   Some cramping with most recent periods for a few weeks   Advil made a bit of a difference with this pain also     - LMP: 4 days ago   - Monthly q 28-30 days   - Lasts 3-5 days   - More painful as above   - 2 pads at a time x 3 per day (not heavy)   - No contraception    Not wanting any    Was on patch     Family planning - no more children   Prenatal vit? No   Contraception - interested     Vaginal sx - no   Urinary sx - no     Safe at home? Yes      Fam hx of   colon cancer - grandmother   breast cancer - no   ovarian cancer - no   cervical cancer - no     T - former.   A - 6 glasses of wine per week -- no more than 3 per day.    I - no     Weight higher with her recent pains   Used to be very active in the gym. Hoping to do this again.   Eating habits - don't always have the time with work and childcare.    Today's PHQ-2 Score:   PHQ-2 ( 1999 Pfizer) 6/20/2022   Q1: Little interest or pleasure in doing things 0   Q2: Feeling down, depressed or hopeless 0   PHQ-2 Score 0   PHQ-2 Total Score (12-17 Years)- Positive if 3 or more points; Administer PHQ-A if positive -   Q1: Little interest or pleasure in doing things Not at all   Q2: Feeling down, depressed or hopeless Several days   PHQ-2 Score 1     Abuse: Current or Past (Physical, Sexual or Emotional) - No  Do you feel safe in your environment? Yes    Social History     Tobacco Use     Smoking status: Former Smoker      Types: Cigarettes     Smokeless tobacco: Never Used     Tobacco comment: Mainly on the weekends when drinking, about one pack per week.    Substance Use Topics     Alcohol use: Yes     Alcohol/week: 2.0 standard drinks     Types: 2 Standard drinks or equivalent per week     Comment: on the weekends       Alcohol Use 6/20/2022   Prescreen: >3 drinks/day or >7 drinks/week? No     Reviewed orders with patient.  Reviewed health maintenance and updated orders accordingly - Yes    Breast Cancer Screening: not yet eligible     FHS-7: n/a     History of abnormal Pap smear: yes - as below in 2015. Had follow up 1 year later; normal/unremarkable. Due for follow up now.   PAP / HPV 2/8/2016 2/4/2015   PAP Negative for squamous intraepithelial lesion or malignancy  Electronically signed by Charlene Dill CT (ASCP) on 2/12/2016 at 11:10 AM   Atypical glandular cells of endocervical origin  Electronically signed by Leah Cueva MD on 2/17/2015 at 12:29 PM       Reviewed and updated as needed this visit by clinical staff   Tobacco  Allergies  Meds   Med Hx  Surg Hx  Fam Hx            Reviewed and updated as needed this visit by Provider                   Past Medical History:   Diagnosis Date     Atypical glandular cells on cervical Pap smear 2/8/2016    Pap smear history: 1999: Abnormal Pap, had colposcopy which was normal. 04/11/2014: Pap with cotest normal with negative HPV. 02/04/2015: Atypical glandular cells of endocervical origin. No HPV detected. 03/11/2015: Colposcopy with abnormal appearing area at 9 o'clock, pathology suggestive of HPV effect, no malignancy. Plan was for repeat Pap and cotest at 12 and 24 months. 02/08/2016: Pap with co     Blighted ovum 7/25/2018     Excessive weight gain during pregnancy in third trimester 3/6/2020     History of colposcopy with cervical biopsy age 14     History of snoring 2/4/2015     Normal spontaneous vaginal delivery 4/1/2020     Obesity      Positive GBS test  "2020     Tobacco dependence 2015      Past Surgical History:   Procedure Laterality Date     NO HISTORY OF SURGERY       OB History    Para Term  AB Living   5 2 2 0 2 2   SAB IAB Ectopic Multiple Live Births   1 0 0 0 0      # Outcome Date GA Lbr Luisito/2nd Weight Sex Delivery Anes PTL Lv   5             4 SAB 18 11w0d    SAB  N    3 AB            2 Term            1 Term               Obstetric Comments   Blighted ovum at 11weeks confirmed via US and beta-quant in ED. Cassandra MALIK       Review of Systems  10 point review of systems negative / unremarkable unless noted above.       OBJECTIVE:   /86   Pulse 73   Temp 97.8  F (36.6  C) (Oral)   Resp 20   Ht 1.654 m (5' 5.12\")   Wt 122.5 kg (270 lb)   LMP 2022 (Approximate)   SpO2 100%   BMI 44.77 kg/m     Wt Readings from Last 4 Encounters:   22 122.5 kg (270 lb)   22 122.9 kg (271 lb)   21 113.4 kg (250 lb)   21 116.1 kg (256 lb)     Physical Exam  Gen: Pleasant. No distress.    HEENT: MMM.   Neck: No overt asymmetry.   CV: Appears well-perfused. RRR. No murmur.   Resp: Breathing comfortably on room air. Lungs clear to auscultation bilaterally without wheeze or crackle.   Abd: Morbidly obese. Non-distended, non-tender.   Ext: No edema or overt asymmetry/deformity.   Skin: No overt rash on easily visualized skin.   Neuro: Non-focal.   Psych: Calm.     PHQ 7/15/2020 2020 3/19/2021   PHQ-9 Total Score 13 11 0   Q9: Thoughts of better off dead/self-harm past 2 weeks Not at all Not at all Not at all         ASSESSMENT/PLAN:   (Z00.01) Annual visit for general adult medical examination with abnormal findings  (primary encounter diagnosis)  Comment: BP at goal on labetolol (residual from prior pregnancy; open to re-evaluating med choice). Weight/BMI as below. Eligible screenings discussed today. In healthy range for alcohol use. Maintains smoking cessation.   Plan:   Due for Pap/HPV   Unable to " provide today due to end of day   Short term follow up visit for pap   Provided vaccine counseling - COVID, as below   Congratulated on healthy substance choices and encouraged to continue   Lifestyle as below   Look to adjust antihypertensive in the future (recommend CCB vs ACE/ARB vs diuretic)    Continue plans (PT) for various pains -- follow up as needed     (E66.01) Morbid obesity (H)  Comment: BMI >40. Noted and discussed. She feels 2/2 to worse diet and limited exercise in context of COVID and childcare demands. Validated the difficulties of these realities. She is agreeable to considering lifestyle changes. Would be a great candidate for bariatric clinic (surgery and/or meds) - declined today.   Plan:   - Did recommend A1c, ALT, lipid today    Not able to provide due to end of day / staffing   - Discussed CDC diet and exercise recommendations   - Discussed limiting junk food while on the road   - Discussed adding 2-3 more servings of vegetables per day   - Discussed increasing exercise to about 1 hour per day through PT and getting back to the gym   - Recommend close follow up and revisiting bariatric clinic     (R40.0) Drowsiness  Comment: Noted. STOP BANG of 6 - high risk JO ANN   Plan:   Adult Sleep Eval & Management  Referral        Recommend sleep study     (N94.6) Dysmenorrhea  Comment: Noted, new over last few months. Stable with NSAIDs. No concern pregnancy.   Plan:   - Continue NSAIDs   - Not open to contraceptive therapy    Offered bedsider website   - Follow up next visit     (F33.40) MDD (recurrent major depressive disorder) in remission (H)  Comment: PHQ-2 score 1; PHQ-9 not even indicated. Last available PHQ-9 from a few months ago was 0. Subjectively under good control. Stressors: childcare and work. Addressing for HCC purposes   Plan:   Follow   Offer supports as requested     (Z28.311) COVID-19 vaccine series not completed  Comment: has not received COVID vaccine -- seemed open to it  "today but not able to provide unfortunately due to end of day / staffing.   Plan:   Recommend revisiting next apptmt     Coding considerations:   - Base code for annual health visit   - Added 99623 given discussion on drowsiness, dysmenorrhea     Patient has been advised of split billing requirements and indicates understanding: Yes    COUNSELING:  Reviewed preventive health counseling, as reflected in patient instructions       Regular exercise       Healthy diet/nutrition       Alcohol Use       Contraception       Family planning       Safe sex practices/STD prevention    Estimated body mass index is 44.77 kg/m  as calculated from the following:    Height as of this encounter: 1.654 m (5' 5.12\").    Weight as of this encounter: 122.5 kg (270 lb).    Weight management plan: Discussed healthy diet and exercise guidelines Specific weight management program called as above discussed    She reports that she has quit smoking. Her smoking use included cigarettes. She has never used smokeless tobacco.      Counseling Resources:  ATP IV Guidelines  Pooled Cohorts Equation Calculator  Breast Cancer Risk Calculator  BRCA-Related Cancer Risk Assessment: FHS-7 Tool  FRAX Risk Assessment  ICSI Preventive Guidelines  Dietary Guidelines for Americans, 2010  USDA's MyPlate  ASA Prophylaxis  Lung CA Screening    Jian Waite DO  [facility]    Precepted with Dr. Palacio   "

## 2022-06-26 PROBLEM — Z28.9 COVID-19 VACCINE SERIES NOT COMPLETED: Status: ACTIVE | Noted: 2022-06-26

## 2022-06-26 PROBLEM — N94.6 DYSMENORRHEA: Status: ACTIVE | Noted: 2022-06-26

## 2022-06-26 PROBLEM — Z28.311 COVID-19 VACCINE SERIES NOT COMPLETED: Status: ACTIVE | Noted: 2022-06-26

## 2022-06-26 PROBLEM — R40.0 DROWSINESS: Status: ACTIVE | Noted: 2022-06-26

## 2022-07-04 NOTE — PROGRESS NOTES
Assessment & Plan     (E66.01) Morbid obesity (H)  (primary encounter diagnosis)  Comment: Due for screening labs -- could not draw last time, returned for today.   Plan:   Labs: Hemoglobin A1c, Lipid Profile, ALT  See discussion from last visit  Reminded of bariatric clinic    She will notify if/when ready     (Z13.9) Screening for condition  Comment: Due for Pap with HPV. Completed today.   Plan: Gynecologic Cytology (PAP), HPV Hold (Lab Only)    (Z28.311) COVID-19 vaccine series not completed  Comment: Discussed today. Contemplative but concerned about side effects especially long term (not studied yet).   Plan:   Validated concerns   Provided resource -- [facility]   Dr. Palacio discussed natural immunity vs vaccination, I reinforced concepts   Notified can do RN visit any time     (I10) Hypertension, unspecified type  Comment: had been placed on labetolol in past (presumably in setting of pre-E during pregnancy). Has been on off for a few weeks to months and BP at goal off of it. At goal today <140/90.   Plan:   Stay off labetolol x a few weeks to months    Taken off med list (and med list cleaned otherwise)   Ambulatory monitoring while off med -- has device at home   Return visit in 2-3 months to review data -- if persistently at goal, stay off med    If above goal, consider alternate agent     *Regarding complexity of MDM:  1. Number/comlexity of conditions: low    2. Amount/complexity of data reviewed/analyzed: low   3. Risk of complications of management: low   Warrants level 15452 code     DO ALLEN [facility]    Precepted with Dr. Hakeem Mays   Ning is a 36 year old F hx of morbid obesity, tobacco use, COVID unvaccinated, likely sleep apnea presenting for the following health issues:  Follow Up (Check-up.)    HPI     Follow up from last visit:  - Had intended to do the following but did not have time at last visit:   Pap/HPV   A1c, ALT,  lipid    Discuss antihypertensive -- transition from BB to other     did go 2-3 months without it     BP was 120s/80s at pharmacy at that time     Would like to trial off medicine for a few months and see blood pressure stays at goal    Discuss COVID vaccine further     Has thought about it a lot     Doing her own research     From recent annual visit    (Z00.01) Annual visit for general adult medical examination with abnormal findings  (primary encounter diagnosis)  Comment: BP at goal on labetolol (residual from prior pregnancy; open to re-evaluating med choice). Weight/BMI as below. Eligible screenings discussed today. In healthy range for alcohol use. Maintains smoking cessation.   Plan:   Due for Pap/HPV              Unable to provide today due to end of day              Short term follow up visit for pap   Provided vaccine counseling - COVID, as below   Congratulated on healthy substance choices and encouraged to continue   Lifestyle as below    Look to adjust antihypertensive in the future (recommend CCB vs ACE/ARB vs diuretic)    Continue plans (PT) for various pains -- follow up as needed      (E66.01) Morbid obesity (H)  Comment: BMI >40. Noted and discussed. She feels 2/2 to worse diet and limited exercise in context of COVID and childcare demands. Validated the difficulties of these realities. She is agreeable to considering lifestyle changes. Would be a great candidate for bariatric clinic (surgery and/or meds) - declined today.   Plan:   - Did recommend A1c, ALT, lipid today               Not able to provide due to end of day / staffing   - Discussed CDC diet and exercise recommendations   - Discussed limiting junk food while on the road   - Discussed adding 2-3 more servings of vegetables per day   - Discussed increasing exercise to about 1 hour per day through PT and getting back to the gym   - Recommend close follow up and revisiting bariatric clinic      (R40.0) Drowsiness  Comment: Noted. STOP  "BANG of 6 - high risk JO ANN   Plan:   Adult Sleep Eval & Management  Referral              Recommend sleep study      (N94.6) Dysmenorrhea  Comment: Noted, new over last few months. Stable with NSAIDs. No concern pregnancy.   Plan:   - Continue NSAIDs   - Not open to contraceptive therapy               Offered bedsider website   - Follow up next visit      (Z28.311) COVID-19 vaccine series not completed  Comment: has not received COVID vaccine -- seemed open to it today but not able to provide unfortunately due to end of day / staffing.   Plan:   Recommend revisiting next apptmt     Review of Systems   Constitutional, HEENT, cardiovascular, pulmonary, GI, , musculoskeletal, neuro, skin, endocrine and psych systems are negative, except as otherwise noted.      Objective    /85   Pulse 88   Temp 98  F (36.7  C)   Resp 12   Ht 1.7 m (5' 6.93\")   Wt 122.7 kg (270 lb 8 oz)   LMP 06/16/2022 (Approximate)   SpO2 98%   BMI 42.46 kg/m    Body mass index is 42.46 kg/m .  Physical Exam   HEENT: MMM.    Lungs clear to auscultation bilaterally without wheeze or crackle.   Abd: morbidly obese. Non-distended.  Ext: No edema or overt asymmetry/deformity.   Skin: No overt rash on easily visualized skin.   Neuro: Non-focal.   Psych: Calm.   : no discharge nor bleeding. Cervix normal appearing. No lesions.       .  ..  "

## 2022-07-05 ENCOUNTER — OFFICE VISIT (OUTPATIENT)
Dept: FAMILY MEDICINE | Facility: CLINIC | Age: 37
End: 2022-07-05
Payer: COMMERCIAL

## 2022-07-05 VITALS
BODY MASS INDEX: 42.46 KG/M2 | WEIGHT: 270.5 LBS | SYSTOLIC BLOOD PRESSURE: 129 MMHG | DIASTOLIC BLOOD PRESSURE: 85 MMHG | OXYGEN SATURATION: 98 % | RESPIRATION RATE: 12 BRPM | TEMPERATURE: 98 F | HEIGHT: 67 IN | HEART RATE: 88 BPM

## 2022-07-05 DIAGNOSIS — Z13.9 SCREENING FOR CONDITION: ICD-10-CM

## 2022-07-05 DIAGNOSIS — I10 HYPERTENSION, UNSPECIFIED TYPE: ICD-10-CM

## 2022-07-05 DIAGNOSIS — Z28.9 COVID-19 VACCINE SERIES NOT COMPLETED: ICD-10-CM

## 2022-07-05 DIAGNOSIS — E66.01 MORBID OBESITY (H): Primary | ICD-10-CM

## 2022-07-05 DIAGNOSIS — Z28.311 COVID-19 VACCINE SERIES NOT COMPLETED: ICD-10-CM

## 2022-07-05 LAB
ALT SERPL W P-5'-P-CCNC: 21 U/L (ref 10–35)
CHOLEST SERPL-MCNC: 114 MG/DL
HBA1C MFR BLD: 5.8 % (ref 0–5.6)
HDLC SERPL-MCNC: 55 MG/DL
LDLC SERPL CALC-MCNC: 37 MG/DL
NONHDLC SERPL-MCNC: 59 MG/DL
TRIGL SERPL-MCNC: 110 MG/DL

## 2022-07-05 PROCEDURE — G0145 SCR C/V CYTO,THINLAYER,RESCR: HCPCS | Performed by: STUDENT IN AN ORGANIZED HEALTH CARE EDUCATION/TRAINING PROGRAM

## 2022-07-05 PROCEDURE — 84460 ALANINE AMINO (ALT) (SGPT): CPT | Performed by: STUDENT IN AN ORGANIZED HEALTH CARE EDUCATION/TRAINING PROGRAM

## 2022-07-05 PROCEDURE — 87624 HPV HI-RISK TYP POOLED RSLT: CPT | Performed by: STUDENT IN AN ORGANIZED HEALTH CARE EDUCATION/TRAINING PROGRAM

## 2022-07-05 PROCEDURE — 99214 OFFICE O/P EST MOD 30 MIN: CPT | Mod: GC | Performed by: STUDENT IN AN ORGANIZED HEALTH CARE EDUCATION/TRAINING PROGRAM

## 2022-07-05 PROCEDURE — 83036 HEMOGLOBIN GLYCOSYLATED A1C: CPT | Performed by: STUDENT IN AN ORGANIZED HEALTH CARE EDUCATION/TRAINING PROGRAM

## 2022-07-05 PROCEDURE — 36415 COLL VENOUS BLD VENIPUNCTURE: CPT | Performed by: STUDENT IN AN ORGANIZED HEALTH CARE EDUCATION/TRAINING PROGRAM

## 2022-07-05 PROCEDURE — 80061 LIPID PANEL: CPT | Performed by: STUDENT IN AN ORGANIZED HEALTH CARE EDUCATION/TRAINING PROGRAM

## 2022-07-05 RX ORDER — LABETALOL 100 MG/1
100 TABLET, FILM COATED ORAL 2 TIMES DAILY
Qty: 60 TABLET | OUTPATIENT
Start: 2022-07-05

## 2022-07-05 ASSESSMENT — ANXIETY QUESTIONNAIRES
GAD7 TOTAL SCORE: 4
1. FEELING NERVOUS, ANXIOUS, OR ON EDGE: SEVERAL DAYS
3. WORRYING TOO MUCH ABOUT DIFFERENT THINGS: SEVERAL DAYS
5. BEING SO RESTLESS THAT IT IS HARD TO SIT STILL: NOT AT ALL
7. FEELING AFRAID AS IF SOMETHING AWFUL MIGHT HAPPEN: NOT AT ALL
2. NOT BEING ABLE TO STOP OR CONTROL WORRYING: SEVERAL DAYS
GAD7 TOTAL SCORE: 4
6. BECOMING EASILY ANNOYED OR IRRITABLE: SEVERAL DAYS

## 2022-07-05 ASSESSMENT — PATIENT HEALTH QUESTIONNAIRE - PHQ9
SUM OF ALL RESPONSES TO PHQ QUESTIONS 1-9: 7
5. POOR APPETITE OR OVEREATING: NOT AT ALL

## 2022-07-05 NOTE — PATIENT INSTRUCTIONS
Please check your blood pressures a couple times per week over the next 3 months     Please write them down     If they are consistently less than 140/90 than we likely don't need a medicine   If they are consistently higher, then we can discuss further tests and/or medications

## 2022-07-08 LAB
BKR LAB AP GYN ADEQUACY: NORMAL
BKR LAB AP GYN INTERPRETATION: NORMAL
BKR LAB AP HPV REFLEX: NORMAL
BKR LAB AP PREVIOUS ABNORMAL: NORMAL
PATH REPORT.COMMENTS IMP SPEC: NORMAL
PATH REPORT.COMMENTS IMP SPEC: NORMAL
PATH REPORT.RELEVANT HX SPEC: NORMAL

## 2022-07-11 LAB
HUMAN PAPILLOMA VIRUS 16 DNA: NEGATIVE
HUMAN PAPILLOMA VIRUS 18 DNA: NEGATIVE
HUMAN PAPILLOMA VIRUS FINAL DIAGNOSIS: NORMAL
HUMAN PAPILLOMA VIRUS OTHER HR: NEGATIVE

## 2022-07-13 ENCOUNTER — VIRTUAL VISIT (OUTPATIENT)
Dept: SLEEP MEDICINE | Facility: CLINIC | Age: 37
End: 2022-07-13
Attending: STUDENT IN AN ORGANIZED HEALTH CARE EDUCATION/TRAINING PROGRAM
Payer: COMMERCIAL

## 2022-07-13 VITALS — BODY MASS INDEX: 44.98 KG/M2 | WEIGHT: 270 LBS | HEIGHT: 65 IN

## 2022-07-13 DIAGNOSIS — R06.83 SNORING: ICD-10-CM

## 2022-07-13 DIAGNOSIS — G47.10 HYPERSOMNIA: Primary | ICD-10-CM

## 2022-07-13 DIAGNOSIS — E66.01 MORBID OBESITY (H): ICD-10-CM

## 2022-07-13 DIAGNOSIS — R06.81 WITNESSED EPISODE OF APNEA: ICD-10-CM

## 2022-07-13 PROCEDURE — 99204 OFFICE O/P NEW MOD 45 MIN: CPT | Mod: 95 | Performed by: INTERNAL MEDICINE

## 2022-07-13 NOTE — PROGRESS NOTES
Ning is a 36 year old who is being evaluated via a billable video visit.      How would you like to obtain your AVS? MyChart  If the video visit is dropped, the invitation should be resent by: Send to e-mail at: [email or web address]  Will anyone else be joining your video visit? No      Video-Visit Details    Video Start Time: 3:02 PM    Type of service:  Video Visit    Video End Time:3:18 PM    Originating Location (pt. Location): Home    Distant Location (provider location):  [facility]     Platform used for Video Visit: [employer]     Additional 15 minutes on the date of service was spent performing the following:    -Preparing to see the patient  -Obtaining and/or reviewing separately obtained history   -Ordering medications, tests, or procedures   -Documenting clinical information in the electronic or other health record     Thank you for the opportunity to participate in the care of  Ning Giraldo.    Assessment and Plan:    In summary Ning Giraldo is a 36 year old year old female here for sleep disturbance.  1. Witness apnea/Hypersomnia/Snoring/Morbid Obesity   Ning Giraldo has high risk for obstructive sleep apnea based on the history of witness apnea, hypersomnia, snoring and a crowded airway. I educated the patient on the underlying pathophysiology of obstructive sleep apnea. We reviewed the risks associated with sleep apnea, including increased cardiovascular risk and overall death. We talked about treatments briefly. I recommend getting a Home sleep study. The patient should return to the clinic to discuss results and treatment option in a patient-centered approach.    Lab reviewed: Discussed with patient.    History of present illness:    She is a 36 year old female who comes to the virtual clinic with a chief complaints of excessive daytime sleepiness that is been going on for more than a year.  The patient has been told that she does have pauses in her breathing during  sleep followed by loud snoring.  Her body mass index is also elevated at 44.9.     Ideal Sleep-Wake Cycle(devoid of societal pressure):    Patient would try to initiate sleep at around 10-10:30 PM with a sleep latency of 45 minutes to 1 hour. The patient would have 3 awakenings. Final wake up time is around 9 AM.    SAMMY:  SAMMY Total Score: 11    Patient told to return in one week after the sleep study is interpreted.    Patient Active Problem List   Diagnosis     History of snoring     Morbid obesity (H)     Acquired pes planus of both feet     Personal history of tobacco use     MDD (major depressive disorder), recurrent episode, moderate (H)     Drowsiness     Dysmenorrhea     COVID-19 vaccine series not completed     Past Medical History:   Diagnosis Date     Atypical glandular cells on cervical Pap smear 2/8/2016    Pap smear history: 1999: Abnormal Pap, had colposcopy which was normal. 04/11/2014: Pap with cotest normal with negative HPV. 02/04/2015: Atypical glandular cells of endocervical origin. No HPV detected. 03/11/2015: Colposcopy with abnormal appearing area at 9 o'clock, pathology suggestive of HPV effect, no malignancy. Plan was for repeat Pap and cotest at 12 and 24 months. 02/08/2016: Pap with co     Blighted ovum 7/25/2018     Excessive weight gain during pregnancy in third trimester 3/6/2020     History of colposcopy with cervical biopsy age 14     History of snoring 2/4/2015     Normal spontaneous vaginal delivery 4/1/2020     Obesity      Positive GBS test 2/28/2020     Tobacco dependence 2/4/2015     Past Surgical History:   Procedure Laterality Date     NO HISTORY OF SURGERY       Current Outpatient Medications   Medication Sig Dispense Refill     order for DME Equipment being ordered: Digital home blood pressure monitor kit 1 Device 0     No known allergies  Social History     Socioeconomic History     Marital status: Single     Spouse name: Not on file     Number of children: Not on file      Years of education: Not on file     Highest education level: Not on file   Occupational History     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   Tobacco Use     Smoking status: Former Smoker     Types: Cigarettes     Smokeless tobacco: Never Used     Tobacco comment: Mainly on the weekends when drinking, about one pack per week.    Substance and Sexual Activity     Alcohol use: Yes     Alcohol/week: 2.0 standard drinks     Types: 2 Standard drinks or equivalent per week     Comment: on the weekends     Drug use: No     Sexual activity: Yes     Partners: Male     Birth control/protection: Patch   Other Topics Concern     Not on file   Social History Narrative    Children: Jayesh Stevo 6yo, Yaris Stevo 13yo     Social Determinants of Health     Financial Resource Strain: Not on file   Food Insecurity: Not on file   Transportation Needs: Not on file   Physical Activity: Not on file   Stress: Not on file   Social Connections: Not on file   Intimate Partner Violence: Not on file   Housing Stability: Not on file     Family History   Problem Relation Age of Onset     Colon Cancer Maternal Grandmother 65     Heart Failure Mother      Liver Disease Mother         hepatitis c     Hypertension Mother         living     Coronary Artery Disease Mother      Heart Failure Father         living     Coronary Artery Disease Father      Pacemaker Father      Prostate Cancer Father      Myocardial Infarction Maternal Aunt         2 aunts  in 50's      Ovarian Cancer Sister          at age 40     Diabetes No family hx of      Cerebrovascular Disease No family hx of      Thyroid Disease No family hx of      Breast Cancer No family hx of         Physical Exam:  GEN: NAD,   Head: Normocephalic.  EYES: EOMI  ENT: Oropharynx is clear, Alvarez class 4+ airway.  Psych: normal mood, normal affect     Labs/Studies:     No results found for: PH, PHARTERIAL, PO2, [id number], SAT, PCO2, HCO3, BASEEXCESS, SUZANNA, BEB  Lab Results    Component Value Date    TSH 1.28 07/21/2021     Lab Results   Component Value Date     03/26/2020     09/09/2019     Lab Results   Component Value Date    HGB 11.7 02/22/2021    HGB 11.3 (L) 03/26/2020     Lab Results   Component Value Date    BUN 7 (L) 03/26/2020    BUN 11 09/09/2019    CR 0.83 03/26/2020    CR 0.80 03/25/2020     Lab Results   Component Value Date    AST 15 03/26/2020    AST 15 03/25/2020    ALT 21 07/05/2022    ALT 19 03/26/2020    ALKPHOS 200 (H) 03/26/2020    ALKPHOS 67 09/09/2019    BILITOTAL 0.5 03/26/2020    BILITOTAL 0.2 09/09/2019     Lab Results   Component Value Date    UAMP Screen Negative 03/25/2020    UBARB Screen Negative 03/25/2020    UCANN Screen Negative 03/25/2020    UCOC Screen Negative 03/25/2020    OPIT Screen Negative 03/25/2020    UPCP Screen Negative 03/25/2020       Recent Labs   Lab Test 03/26/20  1914 03/25/20  1252 09/09/19  1817     --  136   POTASSIUM 4.3  --  3.4*   CHLORIDE 107  --  104   CO2 22  --  24   ANIONGAP 9  --  8     --  100   BUN 7*  --  11   CR 0.83 0.80 0.73   LIZ 8.4*  --  9.2       No results found for: JOSE Castro DO  Board Certified in Internal Medicine and Sleep Medicine    (Note created with Dragon voice recognition and unintended spelling errors and word substitutions may occur)

## 2022-07-13 NOTE — PATIENT INSTRUCTIONS
What is a Home Sleep Study?    your doctor can give you a portable sleep monitor to use at home, so you don t have to spend the night in the sleep lab. But you should use a portable monitor only if:   ?Your doctor thinks you have a condition that makes you stop breathing for short periods while you are asleep, called  sleep apnea.    ?You do not have other serious medical problems, such as heart disease or lung disease.    Please bring the home sleep study device back to the sleep center as soon as you are finished with it so we can score it.     The cost of care estimate line is [phone]. They are able to give the patient an estimate of the charges and also an estimate of their insurance coverage/patient responsibility.   After your sleep study is performed, please call us at [phone] or [phone] to schedule for a follow up to review the results of the sleep study.    Consider using one tab of low dose melatonin 3 mg or less on the night of the study.    It is completely voluntary.    Do not drive or operate machinery after intake of melatonin.     Due to the pandemic, we have many people waiting in line for sleep studies- this wait list is improving each week.   You should receive a call within 2 weeks from our staff to schedule you for  your test.   Depending on the delay in approval by your insurance carrier, the study will be completed within a few days to 2 weeks of that call.

## 2022-07-19 NOTE — NURSING NOTE
Pt waiting to hear back from insurance before scheduling watchpat and f/u.    Jimmie Tripp, Visit facilitator

## 2022-07-26 ENCOUNTER — HOSPITAL ENCOUNTER (OUTPATIENT)
Dept: PHYSICAL THERAPY | Facility: REHABILITATION | Age: 37
Discharge: HOME | End: 2022-07-26
Payer: COMMERCIAL

## 2022-07-26 DIAGNOSIS — M17.12 OSTEOARTHRITIS OF LEFT KNEE, UNSPECIFIED OSTEOARTHRITIS TYPE: ICD-10-CM

## 2022-07-26 DIAGNOSIS — M25.562 CHRONIC PAIN OF LEFT KNEE: Primary | ICD-10-CM

## 2022-07-26 DIAGNOSIS — G89.29 CHRONIC PAIN OF LEFT KNEE: Primary | ICD-10-CM

## 2022-07-26 PROCEDURE — 97140 MANUAL THERAPY 1/> REGIONS: CPT | Mod: GP

## 2022-07-26 PROCEDURE — 97110 THERAPEUTIC EXERCISES: CPT | Mod: GP

## 2022-08-09 ENCOUNTER — HOSPITAL ENCOUNTER (OUTPATIENT)
Dept: PHYSICAL THERAPY | Facility: REHABILITATION | Age: 37
Discharge: HOME | End: 2022-08-09
Payer: COMMERCIAL

## 2022-08-09 DIAGNOSIS — M17.12 OSTEOARTHRITIS OF LEFT KNEE, UNSPECIFIED OSTEOARTHRITIS TYPE: ICD-10-CM

## 2022-08-09 DIAGNOSIS — M25.562 CHRONIC PAIN OF LEFT KNEE: Primary | ICD-10-CM

## 2022-08-09 DIAGNOSIS — G89.29 CHRONIC PAIN OF LEFT KNEE: Primary | ICD-10-CM

## 2022-08-09 PROCEDURE — 97110 THERAPEUTIC EXERCISES: CPT | Mod: GP

## 2022-08-23 ENCOUNTER — HOSPITAL ENCOUNTER (OUTPATIENT)
Dept: PHYSICAL THERAPY | Facility: REHABILITATION | Age: 37
Discharge: HOME | End: 2022-08-23
Payer: COMMERCIAL

## 2022-08-23 DIAGNOSIS — M25.562 CHRONIC PAIN OF LEFT KNEE: Primary | ICD-10-CM

## 2022-08-23 DIAGNOSIS — M17.12 OSTEOARTHRITIS OF LEFT KNEE, UNSPECIFIED OSTEOARTHRITIS TYPE: ICD-10-CM

## 2022-08-23 DIAGNOSIS — G89.29 CHRONIC PAIN OF LEFT KNEE: Primary | ICD-10-CM

## 2022-08-23 PROCEDURE — 97110 THERAPEUTIC EXERCISES: CPT | Mod: GP

## 2022-08-23 PROCEDURE — 97140 MANUAL THERAPY 1/> REGIONS: CPT | Mod: GP

## 2022-09-06 ENCOUNTER — HOSPITAL ENCOUNTER (OUTPATIENT)
Dept: PHYSICAL THERAPY | Facility: REHABILITATION | Age: 37
Discharge: HOME | End: 2022-09-06
Payer: COMMERCIAL

## 2022-09-06 DIAGNOSIS — M17.12 OSTEOARTHRITIS OF LEFT KNEE, UNSPECIFIED OSTEOARTHRITIS TYPE: ICD-10-CM

## 2022-09-06 DIAGNOSIS — G89.29 CHRONIC PAIN OF LEFT KNEE: Primary | ICD-10-CM

## 2022-09-06 DIAGNOSIS — M25.562 CHRONIC PAIN OF LEFT KNEE: Primary | ICD-10-CM

## 2022-09-06 PROCEDURE — 97110 THERAPEUTIC EXERCISES: CPT | Mod: GP

## 2022-09-06 NOTE — PROGRESS NOTES
[facility]    OUTPATIENT PHYSICAL THERAPY  PLAN OF TREATMENT FOR OUTPATIENT REHABILITATION AND PROGRESS NOTE           Patient's Last Name, First Name, Ning Bueno Date of Birth  1985   Provider's Name  [facility] Medical Record No.  [id number]    Onset Date  6/15/22 Start of Care Date  6/15/22   Type:     _X_PT   ___OT   ___SLP Medical Diagnosis  R knee OA   PT Diagnosis  R knee pain; R knee decreased ROM and strength Plan of Treatment  Frequency/Duration: last visit  Certification date from 9/6/22      Goals:  Goal Identifier LEFS   Goal Description Patient will improve LEFS score by at least 9 points to show improvement in function.   Target Date 08/25/22   Date Met      Progress (detail required for progress note): (P) MET     Goal Identifier Standing   Goal Description Patient will tolerate a standing position for at least 30 minutes to return to PLOF   Target Date 08/25/22   Date Met      Progress (detail required for progress note): MET     Goal Identifier L knee ROM   Goal Description Patient will demonstrate improved pain-free L knee ROM to improve functional mobility.   Target Date 08/25/22   Date Met      Progress (detail required for progress note): MET           Beginning/End Dates of Progress Note Reporting Period:  6/15/22 to 9/6/22    Progress Toward Goals:   Progress this reporting period: see above. Met all goals.     Client Self (Subjective) Report for Progress Note Reporting Period: Patient continuing to do well. Knee was a little sore over the weekend.    Outcome Measures (Most Recent Score):    LEFS: 71/80    Objective Measurements:   Objective Measure: L knee ROM  Details: 0-124  Objective Measure: (P) LEFS  Details: (P) 71/80 (IE: 40/80)               I CERTIFY THE NEED FOR THESE SERVICES FURNISHED UNDER        THIS PLAN OF  TREATMENT AND WHILE UNDER MY CARE     (Physician co-signature of this document indicates review and certification of the therapy plan).              Referring Provider: Kirby Clark, MD Scott Parmer, PT

## 2022-09-11 ENCOUNTER — HEALTH MAINTENANCE LETTER (OUTPATIENT)
Age: 37
End: 2022-09-11

## 2022-12-13 NOTE — PROGRESS NOTES
[facility]    Outpatient Physical Therapy Discharge Note  Patient: Ning Giraldo  : 1985    Beginning/End Dates of Reporting Period:  6/15/22 to 22    Referring Provider: Joel Fortune MD    Therapy Diagnosis: Knee pain     Client Self Report: Patient continuing to do well. Knee was a little sore over the weekend.    Objective Measurements:  Objective Measure: L knee ROM  Details: 0-124  Objective Measure: LEFS  Details: 80 (IE: 40/80)               Goals:  Goal Identifier LEFS   Goal Description Patient will improve LEFS score by at least 9 points to show improvement in function.   Target Date 22   Date Met      Progress (detail required for progress note): MET     Goal Identifier Standing   Goal Description Patient will tolerate a standing position for at least 30 minutes to return to PLOF   Target Date 22   Date Met      Progress (detail required for progress note): MET     Goal Identifier L knee ROM   Goal Description Patient will demonstrate improved pain-free L knee ROM to improve functional mobility.   Target Date 22   Date Met      Progress (detail required for progress note): MET     Goal Identifier     Goal Description     Target Date     Date Met      Progress (detail required for progress note):       Goal Identifier     Goal Description     Target Date     Date Met      Progress (detail required for progress note):       Goal Identifier     Goal Description     Target Date     Date Met      Progress (detail required for progress note):       Goal Identifier     Goal Description     Target Date     Date Met      Progress (detail required for progress note):       Goal Identifier     Goal Description     Target Date     Date Met      Progress (detail required for progress note):             Plan:  Discharge from therapy.    Discharge:    Reason for Discharge: Patient has  met all goals.  Patient chooses to discontinue therapy.    Equipment Issued: none    Discharge Plan: Patient to continue home program.    Scott Parmer, PT

## 2022-12-13 NOTE — ADDENDUM NOTE
Encounter addended by: Parmer, Scott, PT on: 12/13/2022 11:34 AM   Actions taken: Episode resolved, Clinical Note Signed

## 2023-04-05 ENCOUNTER — OFFICE VISIT (OUTPATIENT)
Dept: FAMILY MEDICINE | Facility: CLINIC | Age: 38
End: 2023-04-05
Payer: COMMERCIAL

## 2023-04-05 VITALS
WEIGHT: 205.08 LBS | OXYGEN SATURATION: 99 % | TEMPERATURE: 98.1 F | HEART RATE: 71 BPM | HEIGHT: 66 IN | SYSTOLIC BLOOD PRESSURE: 114 MMHG | RESPIRATION RATE: 18 BRPM | DIASTOLIC BLOOD PRESSURE: 80 MMHG | BODY MASS INDEX: 32.96 KG/M2

## 2023-04-05 DIAGNOSIS — F33.40 MDD (RECURRENT MAJOR DEPRESSIVE DISORDER) IN REMISSION (H): ICD-10-CM

## 2023-04-05 DIAGNOSIS — N64.4 BREAST PAIN, LEFT: Primary | ICD-10-CM

## 2023-04-05 PROCEDURE — 99214 OFFICE O/P EST MOD 30 MIN: CPT | Mod: GC | Performed by: STUDENT IN AN ORGANIZED HEALTH CARE EDUCATION/TRAINING PROGRAM

## 2023-04-05 ASSESSMENT — PATIENT HEALTH QUESTIONNAIRE - PHQ9: SUM OF ALL RESPONSES TO PHQ QUESTIONS 1-9: 0

## 2023-04-05 NOTE — PROGRESS NOTES
"  Assessment & Plan     1. Breast pain, left  New acute problem. Possibly related to period but not clear based on this is her first episode. No mass or current nipple discharge. Etiology unknown, possibly inflammatory. Considered possibility of muscle strain with her lifting routines. R/o malignancy given age.    - MA DIAGNOSTIC  DIGITAL BILATERAL; Future  - diclofenac (VOLTAREN) 1 % topical gel; Apply 2 g topically 4 times daily  Dispense: 50 g; Refill: 1   Discussed risks/benefits of following med, verbalized understanding and in agreement to starting   - See back after mammogram obtained, sooner as needed     MDD (recurrent major depressive disorder) in remission (H)  In remission. PHQ score of 0 today. Feels a little anxious in setting of breast complaint but otherwise doing well.   -Continue current management   -Does not wish to consider meds / therapy at this time, I feel this is reasonable   -Follow up in 6 months, sooner if needed     Jian Waite DO  [facility]    Precepted with Dr. Diane Mays   Ning is a 37 year old F hx morbid obesity, tobacco use, presenting for the following health issues:  Breast Mass (L breast. Pt states something move, had started as burning sensation. No drainage. No mammograms done in past. /Pt does exercise, lifts weights unsure if that could be it. )    HPI     Breast pain   \"Discomfort / burning\" left breast -- lateral aspect   Onset: 2-3 weeks ago   Course: same   Cyclic vs non-cyclic? Probably non cyclic      Location: as above   Radiation: no       Notices it with some positions while she is sleeping (laying on the left)   Otherwise no exacerbating or alleviating factors   Lift weights -- doesn't notice it too much. She is avoiding certain lifts.   Bra fit   Should be okay.    Did change bras and didn't notice any change    Now using mostly sports bras     Tried: nothing     Any lumps? No    Redness no    Did have " "transient nipple discharge x 1-2 days last year, none before or since   Rash no   Fever no   Chills no   Lymph nodes no   Mood - good. PHQ score 0.      Family hx breast cancer? No    On any contraception? Not on. Off for last 2 years.    In past: Depo, Likes the patch - but smoking   Family plan? Not wanting more children.      LMP last week     Review of Systems   Constitutional, HEENT, cardiovascular, pulmonary, GI, , musculoskeletal, neuro, skin, endocrine and psych systems are negative, except as otherwise noted.      Objective    /80 (BP Location: Right arm, Patient Position: Sitting, Cuff Size: Adult Large)   Pulse 71   Temp 98.1  F (36.7  C) (Oral)   Resp 18   Ht 1.68 m (5' 6.14\")   Wt 93 kg (205 lb 1.3 oz)   LMP 03/24/2023 (Approximate)   SpO2 99%   BMI 32.96 kg/m    There is no height or weight on file to calculate BMI.  Physical Exam     Gen: Pleasant. No distress.    HEENT: MMM.   Skin: No overt rash nor wounds over the left breast / axilla nor the remainder of easily visualized skin.   Breast: minimal fibroglandular tissue appreciated over left upper quadrant. No nipple changes nor discharge.   Lymph: no adenopathy appreciated in the left axilla nor supraclavicular area   Neck: No overt asymmetry.   CV: Appears well-perfused.   Resp: Breathing comfortably on room air.   Abd: Non-distended, non-tender.   Ext: No edema or overt asymmetry/deformity.   Neuro: Non-focal.   Psych: Calm.             3/19/2021     3:54 PM 7/5/2022     9:52 AM 4/5/2023     9:54 AM   PHQ   PHQ-9 Total Score 0 7 0   Q9: Thoughts of better off dead/self-harm past 2 weeks Not at all Not at all Not at all          "

## 2023-04-05 NOTE — PROGRESS NOTES
Preceptor Attestation:   Patient seen, evaluated and discussed with the resident.  I examined the patient as well and left breast has no concerning lumps, only scattered fibrous tissue around 1-2 o'clock region.   No axillary lymphadenopathy. I have verified the content of the note, which accurately reflects my assessment of the patient and the plan of care.    Supervising Physician:Sherlyn Contreras MD    [facility]   Color consistent with ethnicity/race, warm, dry intact, resilient.

## 2023-05-05 ENCOUNTER — ANCILLARY PROCEDURE (OUTPATIENT)
Dept: MAMMOGRAPHY | Facility: CLINIC | Age: 38
End: 2023-05-05
Attending: FAMILY MEDICINE
Payer: COMMERCIAL

## 2023-05-05 DIAGNOSIS — N64.4 BREAST PAIN, LEFT: ICD-10-CM

## 2023-05-05 PROCEDURE — 77062 BREAST TOMOSYNTHESIS BI: CPT

## 2023-05-05 PROCEDURE — 76642 ULTRASOUND BREAST LIMITED: CPT | Mod: LT

## 2023-06-09 ENCOUNTER — OFFICE VISIT (OUTPATIENT)
Dept: FAMILY MEDICINE | Facility: CLINIC | Age: 38
End: 2023-06-09
Payer: COMMERCIAL

## 2023-06-09 VITALS
WEIGHT: 201 LBS | HEIGHT: 66 IN | DIASTOLIC BLOOD PRESSURE: 77 MMHG | HEART RATE: 73 BPM | OXYGEN SATURATION: 98 % | BODY MASS INDEX: 32.3 KG/M2 | SYSTOLIC BLOOD PRESSURE: 111 MMHG

## 2023-06-09 DIAGNOSIS — E66.811 CLASS 1 OBESITY DUE TO EXCESS CALORIES WITHOUT SERIOUS COMORBIDITY WITH BODY MASS INDEX (BMI) OF 32.0 TO 32.9 IN ADULT: ICD-10-CM

## 2023-06-09 DIAGNOSIS — Z00.00 ROUTINE GENERAL MEDICAL EXAMINATION AT A HEALTH CARE FACILITY: Primary | ICD-10-CM

## 2023-06-09 DIAGNOSIS — M62.08 DIASTASIS OF RECTUS ABDOMINIS: ICD-10-CM

## 2023-06-09 DIAGNOSIS — H10.13 ALLERGIC CONJUNCTIVITIS, BILATERAL: ICD-10-CM

## 2023-06-09 DIAGNOSIS — E66.09 CLASS 1 OBESITY DUE TO EXCESS CALORIES WITHOUT SERIOUS COMORBIDITY WITH BODY MASS INDEX (BMI) OF 32.0 TO 32.9 IN ADULT: ICD-10-CM

## 2023-06-09 PROCEDURE — 99395 PREV VISIT EST AGE 18-39: CPT | Mod: GC | Performed by: STUDENT IN AN ORGANIZED HEALTH CARE EDUCATION/TRAINING PROGRAM

## 2023-06-09 RX ORDER — LORATADINE 10 MG/1
10 TABLET ORAL DAILY
Qty: 30 TABLET | Refills: 0 | Status: SHIPPED | OUTPATIENT
Start: 2023-06-09 | End: 2023-07-09

## 2023-06-09 ASSESSMENT — ENCOUNTER SYMPTOMS
SHORTNESS OF BREATH: 0
WEAKNESS: 0
PARESTHESIAS: 0
EYE PAIN: 0
PALPITATIONS: 0
NAUSEA: 0
CONSTIPATION: 0
DIZZINESS: 0
COUGH: 0
BREAST MASS: 0
HEADACHES: 0
MYALGIAS: 0
DIARRHEA: 0
HEMATOCHEZIA: 0
ABDOMINAL PAIN: 0
FEVER: 0
HEMATURIA: 0
SORE THROAT: 0
ARTHRALGIAS: 0
NERVOUS/ANXIOUS: 0
DYSURIA: 0
HEARTBURN: 0
FREQUENCY: 0
CHILLS: 0
JOINT SWELLING: 0

## 2023-06-09 NOTE — PROGRESS NOTES
"   SUBJECTIVE:   CC: Ning is an 37 year old who presents for preventive health visit.       4/5/2023     9:51 AM   Additional Questions   Roomed by jose RIVERA  Ning reports that she is doing OK. Her only concern today is her allergies. She reports a dry throat/hoarseness. There has also been a mild sore throat. She attributes this to her allergies. Her other allergy symptoms include tearing and a runny nose. She has not tried any medications for this.    Ning had some breast pain concerns at her last visit but is no longer having pain. She had a normal mammogram after her last visit and is not concerned about this today.     Ning states that her depression is \"on and off.\" She states that she \"has her days.\" Most days she is okay.     Additionally, she reports that she is no longer smoking. She does not remember the last time she had a cigarette but notes that it was awhile ago.     She states that she drinks sometimes. She drinks about twice a month. During a drinking session she drinks approximately 4 servings of alcohol.     Ning is also working on losing weight using intermittent fasting (eats from 12 pm- 8 pm). She is watching what she is eating and is eating more fruits and vegetables, less processed foods. Ning is working on portion control as well.  She is exercising at minimum 3 days a week but her target is 5 days a week. She has been successful, states that she is down 70 lbs from last year.     She is taking vitamins- vitamin D, magnesium, B complex, probiotic, biotin.     Ning reports pressure/a knot in her mid epigastric region. She states that she has had this evaluated before and workup was negative.      Social History     Tobacco Use     Smoking status: Former     Types: Cigarettes     Smokeless tobacco: Never     Tobacco comments:     Mainly on the weekends when drinking, about one pack per week. Occasionally    Vaping Use     Vaping status: Not on file   Substance Use Topics     Alcohol " use: Yes     Alcohol/week: 2.0 standard drinks of alcohol     Types: 2 Standard drinks or equivalent per week     Comment: on the weekends             2023     3:40 PM   Alcohol Use   Prescreen: >3 drinks/day or >7 drinks/week? No      Reviewed orders with patient.  Reviewed health maintenance and updated orders accordingly - Yes  BP Readings from Last 3 Encounters:   23 111/77   23 114/80   22 129/85    Wt Readings from Last 3 Encounters:   23 91.2 kg (201 lb)   23 93 kg (205 lb 1.3 oz)   22 122.5 kg (270 lb)                  Patient Active Problem List   Diagnosis     History of snoring     Morbid obesity (H)     Acquired pes planus of both feet     Personal history of tobacco use     MDD (major depressive disorder), recurrent episode, moderate (H)     Drowsiness     Dysmenorrhea     COVID-19 vaccine series not completed     Past Surgical History:   Procedure Laterality Date     NO HISTORY OF SURGERY         Social History     Tobacco Use     Smoking status: Former     Types: Cigarettes     Smokeless tobacco: Never     Tobacco comments:     Mainly on the weekends when drinking, about one pack per week. Occasionally    Vaping Use     Vaping status: Not on file   Substance Use Topics     Alcohol use: Yes     Alcohol/week: 2.0 standard drinks of alcohol     Types: 2 Standard drinks or equivalent per week     Comment: on the weekends     Family History   Problem Relation Age of Onset     Heart Failure Mother      Liver Disease Mother         hepatitis c     Hypertension Mother         living     Coronary Artery Disease Mother      Snoring Mother      Heart Failure Father         living     Coronary Artery Disease Father      Pacemaker Father      Prostate Cancer Father      Snoring Father      Ovarian Cancer Sister          at age 40     Colon Cancer Maternal Grandmother 65     Myocardial Infarction Maternal Aunt         2 aunts  in 50's      Diabetes No family hx of       Cerebrovascular Disease No family hx of      Thyroid Disease No family hx of      Breast Cancer No family hx of          Current Outpatient Medications   Medication Sig Dispense Refill     loratadine (CLARITIN) 10 MG tablet Take 1 tablet (10 mg) by mouth daily for 30 days 30 tablet 0     diclofenac (VOLTAREN) 1 % topical gel Apply 2 g topically 4 times daily 50 g 1     order for DME Equipment being ordered: Digital home blood pressure monitor kit 1 Device 0     Allergies   Allergen Reactions     No Known Allergies      Recent Labs   Lab Test 07/05/22  1100 07/21/21  1155 02/22/21  1700 03/26/20  1914 03/25/20  1252 09/09/19  1817 07/31/19  2217 04/04/19  1656 06/12/18  1809 03/23/18  1435   A1C 5.8*  --  5.5  --   --   --   --   --   --  5.4   LDL 37  --   --   --   --   --   --  32  --  3.0   HDL 55  --   --   --   --   --   --  63  --  74.0   TRIG 110  --   --   --   --   --   --   --   --  157.0*   ALT 21  --   --  19 20 13   < >  --   --   --    CR  --   --   --  0.83 0.80 0.73   < >  --    < > 0.8   GFRESTIMATED  --   --   --  >60 >60 >60   < >  --    < >  --    GFRESTBLACK  --   --   --  >60 >60 >60   < >  --    < >  --    POTASSIUM  --   --   --  4.3  --  3.4*   < >  --    < > 3.7   TSH  --  1.28  --   --   --   --   --   --   --   --     < > = values in this interval not displayed.        Breast Cancer Screening:    FHS-7:       5/5/2023     9:27 AM   Breast CA Risk Assessment (FHS-7)   Did any of your first-degree relatives have breast or ovarian cancer? No   Did any of your relatives have bilateral breast cancer? No   Did any man in your family have breast cancer? No   Did any woman in your family have breast and ovarian cancer? No   Did any woman in your family have breast cancer before age 50 y? No   Do you have 2 or more relatives with breast and/or ovarian cancer? No   Do you have 2 or more relatives with breast and/or bowel cancer? No        Patient under 40 years of age: Routine Mammogram  Screening not recommended.   Pertinent mammograms are reviewed under the imaging tab.    History of abnormal Pap smear: NO - age 30-65 PAP every 5 years with negative HPV co-testing recommended      Latest Ref Rng & Units 2022    11:00 AM 2016     3:19 PM 2015    10:20 AM   PAP / HPV   PAP  Negative for Intraepithelial Lesion or Malignancy (NILM)   Negative for squamous intraepithelial lesion or malignancy  Electronically signed by Charlene Dill CT (ASCP) on 2016 at 11:10 AM     Atypical glandular cells of endocervical origin  Electronically signed by Leah Cueva MD on 2015 at 12:29 PM       HPV 16 DNA Negative Negative       HPV 18 DNA Negative Negative       Other HR HPV Negative Negative         Reviewed and updated as needed this visit by clinical staff                  Reviewed and updated as needed this visit by Provider                 Past Medical History:   Diagnosis Date     Atypical glandular cells on cervical Pap smear 2016    Pap smear history: : Abnormal Pap, had colposcopy which was normal. 2014: Pap with cotest normal with negative HPV. 2015: Atypical glandular cells of endocervical origin. No HPV detected. 2015: Colposcopy with abnormal appearing area at 9 o'clock, pathology suggestive of HPV effect, no malignancy. Plan was for repeat Pap and cotest at 12 and 24 months. 2016: Pap with co     Blighted ovum 2018     Excessive weight gain during pregnancy in third trimester 3/6/2020     History of colposcopy with cervical biopsy age 14     History of snoring 2015     Normal spontaneous vaginal delivery 2020     Obesity      Positive GBS test 2020     Tobacco dependence 2015      Past Surgical History:   Procedure Laterality Date     NO HISTORY OF SURGERY       OB History    Para Term  AB Living   5 2 2 0 2 2   SAB IAB Ectopic Multiple Live Births   1 0 0 0 0      # Outcome Date GA Lbr Luisito/2nd Weight Sex  Delivery Anes PTL Lv   5             4 SAB 18 11w0d    SAB  N    3 AB            2 Term            1 Term               Obstetric Comments   Blighted ovum at 11weeks confirmed via US and beta-quant in ED. Cassandra MALIK       Review of Systems  CONSTITUTIONAL: NEGATIVE for fever, chills, change in weight  INTEGUMENTARU/SKIN: NEGATIVE for worrisome rashes, moles or lesions  EYES: NEGATIVE for vision changes or irritation  ENT: NEGATIVE for ear, mouth and throat problems  RESP: NEGATIVE for significant cough or SOB  BREAST: NEGATIVE for masses, tenderness or discharge  CV: NEGATIVE for chest pain, palpitations or peripheral edema  GI: NEGATIVE for nausea, abdominal pain, heartburn, or change in bowel habits  : NEGATIVE for unusual urinary or vaginal symptoms. Periods are regular.  MUSCULOSKELETAL: NEGATIVE for significant arthralgias or myalgia  NEURO: NEGATIVE for weakness, dizziness or paresthesias  PSYCHIATRIC: NEGATIVE for changes in mood or affect     OBJECTIVE:   There were no vitals taken for this visit.  Physical Exam   CV: RRR, no gallops, rubs, or murmurs  Pulmonary: clear to ascultation, no crackles or wheezes  Abdomen: soft to palpation, no tenderness to palpation    GENERAL: healthy, alert and no distress  EYES: Eyes grossly normal to inspection, PERRL and conjunctivae and sclerae normal  HENT: ear canals and TM's normal, nose and mouth without ulcers or lesions  NECK: no adenopathy, no asymmetry, masses, or scars and thyroid normal to palpation  RESP: lungs clear to auscultation - no rales, rhonchi or wheezes  CV: regular rate and rhythm, normal S1 S2, no S3 or S4, no murmur, click or rub, no peripheral edema and peripheral pulses strong  ABDOMEN: soft, nontender, no hepatosplenomegaly, no masses and bowel sounds normal  MS: no gross musculoskeletal defects noted, no edema  SKIN: no suspicious lesions or rashes  NEURO: Normal strength and tone, mentation intact and speech normal  PSYCH:  "mentation appears normal, affect normal/bright         ASSESSMENT/PLAN:   (Z00.00) Routine general medical examination at a health care facility  (primary encounter diagnosis)  Preventative Health:  HTN screening (>17 yo, out of office measurements needed): Normotensive today  Cholesterol Level (>46 yo or at risk): Testing not indicated   Diabetes Screen, A1c (Ages 40-70 who are obese): Not indicated   Breast CA Screening (>41 yo or 10 y before 1st degree relative diagnosis): Testing not indicated   Cervical cancer screening (>20 yo every 3y or 5y w/ HPV): Testing not indicated .   Gonorrhea/Chlamydia screening (<25 yo or at risk ): Testing not indicated.   Syphilis Screening (Anyone at risk): Not indcated  Depression screening: PHQ-2 done today and negative.  Vaccines UTD      (H10.13) Allergic conjunctivitis, bilateral  Comment: Trial of Claritin. Could do Flonase if post nasal drip present  Plan: loratadine (CLARITIN) 10 MG tablet         (M62.08) Diastasis of rectus abdominis  Comment: Long history of diastasis 2/2 multiple pregnancies in the past. No hernia on exam today.   Plan: Recommended core strengthening exercises.    (E66.09,  Z68.32) Class 1 obesity due to excess calories without serious comorbidity with body mass index (BMI) of 32.0 to 32.9 in adult  Comment: Congratulated on weight loss and health maintenance  Plan: continue healthy diet and exercise habits.      COUNSELING:  Reviewed preventive health counseling, as reflected in patient instructions      BMI:   Estimated body mass index is 32.96 kg/m  as calculated from the following:    Height as of 4/5/23: 1.68 m (5' 6.14\").    Weight as of 4/5/23: 93 kg (205 lb 1.3 oz).   Weight management plan: Discussed healthy diet and exercise guidelines      She reports that she has quit smoking. Her smoking use included cigarettes. She has never used smokeless tobacco.             Jamila Syed, MS3    I was present with the medical student who " participated in the service and in the documentation of this note. I have verified the history and personally performed the physical exam and medical decision making, and have verified the content of the note, which accurately reflects my assessment of the patient and the plan of care.     Jael Blanco MD  [facility]

## 2023-06-09 NOTE — PROGRESS NOTES
I have personally reviewed the history and examination as documented by Dr. Blanco and Jamila Syed [facility].  I was present during key portions of the visit and agree with the assessment and plan as documented for 37 yr old female here for well visit. Concerns: 1) seasonal allergies - trial of antihistamine 2) depression - stable  3) obesity - is on a weight loss plan 4) tobacco use - quit! 5) diastasis recti - core strengthening. Age-appropriate health maintenance and anticipatory guidance given.     Primitivo Otoole MD  June 9, 2023  4:29 PM

## 2023-10-20 ENCOUNTER — OFFICE VISIT (OUTPATIENT)
Dept: FAMILY MEDICINE | Facility: CLINIC | Age: 38
End: 2023-10-20
Payer: COMMERCIAL

## 2023-10-20 VITALS
OXYGEN SATURATION: 100 % | DIASTOLIC BLOOD PRESSURE: 91 MMHG | WEIGHT: 194.12 LBS | SYSTOLIC BLOOD PRESSURE: 142 MMHG | HEART RATE: 80 BPM | BODY MASS INDEX: 31.33 KG/M2

## 2023-10-20 DIAGNOSIS — R35.0 URINARY FREQUENCY: ICD-10-CM

## 2023-10-20 DIAGNOSIS — R30.0 DYSURIA: Primary | ICD-10-CM

## 2023-10-20 DIAGNOSIS — Z11.3 SCREEN FOR STD (SEXUALLY TRANSMITTED DISEASE): ICD-10-CM

## 2023-10-20 DIAGNOSIS — N89.8 VAGINAL DISCHARGE: ICD-10-CM

## 2023-10-20 LAB
ALBUMIN UR-MCNC: NEGATIVE MG/DL
APPEARANCE UR: CLEAR
BILIRUB UR QL STRIP: NEGATIVE
CLUE CELLS: ABNORMAL
COLOR UR AUTO: YELLOW
GLUCOSE UR STRIP-MCNC: NEGATIVE MG/DL
HGB UR QL STRIP: NEGATIVE
HIV 1+2 AB+HIV1 P24 AG SERPL QL IA: NONREACTIVE
KETONES UR STRIP-MCNC: NEGATIVE MG/DL
LEUKOCYTE ESTERASE UR QL STRIP: NEGATIVE
NITRATE UR QL: NEGATIVE
PH UR STRIP: 6 [PH] (ref 5–7)
SP GR UR STRIP: 1.02 (ref 1–1.03)
T PALLIDUM AB SER QL: NONREACTIVE
TRICHOMONAS, WET PREP: ABNORMAL
UROBILINOGEN UR STRIP-ACNC: 0.2 E.U./DL
WBC'S/HIGH POWER FIELD, WET PREP: ABNORMAL
YEAST, WET PREP: ABNORMAL

## 2023-10-20 PROCEDURE — 81003 URINALYSIS AUTO W/O SCOPE: CPT

## 2023-10-20 PROCEDURE — 36415 COLL VENOUS BLD VENIPUNCTURE: CPT

## 2023-10-20 PROCEDURE — 87210 SMEAR WET MOUNT SALINE/INK: CPT

## 2023-10-20 PROCEDURE — 86780 TREPONEMA PALLIDUM: CPT

## 2023-10-20 PROCEDURE — 87591 N.GONORRHOEAE DNA AMP PROB: CPT

## 2023-10-20 PROCEDURE — 87389 HIV-1 AG W/HIV-1&-2 AB AG IA: CPT

## 2023-10-20 PROCEDURE — 87491 CHLMYD TRACH DNA AMP PROBE: CPT

## 2023-10-20 PROCEDURE — 99213 OFFICE O/P EST LOW 20 MIN: CPT | Mod: GC

## 2023-10-20 RX ORDER — SULFAMETHOXAZOLE/TRIMETHOPRIM 800-160 MG
1 TABLET ORAL 2 TIMES DAILY
Qty: 6 TABLET | Refills: 0 | Status: SHIPPED | OUTPATIENT
Start: 2023-10-20 | End: 2023-10-20

## 2023-10-20 NOTE — PROGRESS NOTES
I have personally reviewed the history and examination as documented by Dr. Medrano.  I was present during key portions of the visit and agree with the assessment and plan as documented for 37 yr old female here for UTI symptoms, vaginal discharge, and STI testing. Precautions given. Anticipatory guidance given.     Primitivo Otoole MD  October 20, 2023  9:21 AM

## 2023-10-20 NOTE — COMMUNITY RESOURCES LIST (ENGLISH)
10/20/2023   [facility]  N/A  For questions about this resource list or additional care needs, please contact your primary care clinic or care manager.  Phone: [phone]   Email: N/A   Address: [address] [place] [id number]   Hours: N/A        Food and Nutrition       Food pantry  1  [facility] Distance: 1.24 miles      Pickup   [address] [place] [address]  Language: English  Hours: Mon 10:00 AM - 11:30 AM , Thu 10:00 AM - 11:30 AM  Fees: Free   Phone: (390) 760-5893 Email: info@[facility].[employer] Website: https://[facility].[employer]/find-support/partner-organizations/housing-assistance/     2  [facility] [employer] Distance: 1.51 miles      Pickup   [address] [place] [address]  Language: English, Kenyan  Hours: Sat 9:30 AM - 11:30 AM  Fees: Free   Phone: (860) 897-1647 Website: http://www.[facility]     SNAP application assistance  3  [facility] [place] Distance: 1.99 miles      Phone/Virtual   [address] [place] [address]  Language: English  Hours: Mon - Thu 9:00 AM - 4:00 PM , Fri 9:00 AM - 2:00 PM , Sun 10:00 AM - 12:00 PM  Fees: Free   Phone: (931) 152-7593 Email: [email or web address]@[email or web address] [place] [place].[facility].org Website: http://[facility].org/[facility]/     4  [place] [employer] - [employer] (SNAP) Distance: 3.21 miles      Phone/Virtual   [address] [address] [place] [address]  Language: English, Hmong, [place] Islander, Montenegrin, Kenyan, Divehi  Hours: Mon - Fri 9:00 AM - 5:00 PM  Fees: Free   Phone: (436) 296-4076 Website: https://[place].gov/dhs/people-we-serve/adults/economic-assistance/food-nutrition/programs-and-services/supplemental-nutrition-assistance-program.jsp     Soup kitchen or free meals  5  [facility] [place] [place] Distance: 0.99 miles      [facility]   [address] [place] [address]  Language: English  Hours: Wed 5:30 PM - 6:30 PM  Fees: Free   Phone: (545) 424-5813 Email: office@[facility].org Website: https://www.[facility].org     6  [facility] Distance: 1.99 miles      [facility]   [place] [place] [address]  Language: English  Hours: Mon - Fri 11:45 AM - 12:45 PM  Fees: Free   Phone: (342) 407-2119 Email: ankita@[facility].Lists of hospitals in the [facility]ation[facility] Beatsy.org Website: http://[facility].org/[facility]/          Important Numbers & Websites       Emergency Services   911  [facility] Services   311  Poison Control   (260) 584-6655  Suicide Prevention Lifeline   (798) 353-8493 (TALK)  Child Abuse Hotline   (258) 979-3398 (4-A-Child)  Sexual Assault Hotline   (690) 530-5631 (HOPE)  National Runaway Safeline   (258) 108-6352 (RUNAWAY)  All-Options Talkline   (883) 680-5807  Substance Abuse Referral   (507) 695-7837 (HELP)

## 2023-10-20 NOTE — PROGRESS NOTES
Assessment & Plan     Dysuria  Urinary frequency  Patient presents with continued urinary frequency and dysuria after completion of nitrofurantoin for suspected UTI.  With ongoing symptoms, initially concern for persistent UTI, however UA unremarkable.  Hold off on starting antibiotics.  Will await STI testing as below.  Recommended avoiding bladder irritants.  Follow-up precautions provided.  - UA Macroscopic with reflex to Microscopic and Culture    Vaginal discharge  1 day of vaginal discharge.  With intermittent vaginal itchiness.  Wet prep today unremarkable.  Will await STD testing as below.  Counseled on hygiene.  - Wet preparation    Screen for STD (sexually transmitted disease)  Dysuria vaginal discharge as above.  New sexual partner within the past 2 months..  Not using barrier contraception.  Will collect GC chlamydia, HIV, syphilis.  Negative for trichomonas.  - Chlamydia trachomatis/Neisseria gonorrhoeae by PCR - Clinic Collect  - HIV Antigen Antibody Combo Cascade  - Syphilis Screen Cascade (RPR/VDRL)        No follow-ups on file.    Billy Medrano MD  [facility] Subjective Paris is a 37 year old, presenting for the following health issues:  UTI (Patient stating had systems but took medications does not seem gone) and Vaginal Problem (Discharge)      HPI       UTI symptoms   - 1.5 week ago    [facility])    - No UA/culture completed  - Was having urinary urgency, frequency   - Dysuria   - Initially improved after starting antibiotics    Currently,  Mild urgency, symptoms come and go.   - Had intercourse during treatment of UTI.   - No fevers or chills  - No flank pain    Vaginal discharge   - Thicker white    - Had some vaginal itchiness over the past week    - No itchiness currently    New sexual partner  - In September   - Male partner   - No condom use   - Is urinating after sex  - On birth control  -  Took plan B recently       Review of Systems   Constitutional, HEENT, cardiovascular, pulmonary, gi and gu systems are negative, except as otherwise noted.      Objective    BP (!) 142/91 (BP Location: Right arm, Patient Position: Sitting, Cuff Size: Adult Regular)   Pulse 80   Wt 88.1 kg (194 lb 1.9 oz)   SpO2 100%   BMI 31.33 kg/m    Body mass index is 31.33 kg/m .  Physical Exam   Constitutional: Conversant, well developed. No acute distress  Eyes: Anicteric sclerae, no lid lag  Respiratory: Normal respiratory effort  Cardiovascular: No peripheral edema  Skin: No rash, lesion, or ulcer  Musculoskeletal: No digital cyanosis, normal gait  Psych: Intact judgment and insight. Alert and oriented x3. Cordial affect

## 2023-10-21 LAB
C TRACH DNA SPEC QL PROBE+SIG AMP: NEGATIVE
N GONORRHOEA DNA SPEC QL NAA+PROBE: NEGATIVE

## 2023-12-12 ENCOUNTER — OFFICE VISIT (OUTPATIENT)
Dept: FAMILY MEDICINE | Facility: CLINIC | Age: 38
End: 2023-12-12
Payer: COMMERCIAL

## 2023-12-12 VITALS
SYSTOLIC BLOOD PRESSURE: 123 MMHG | DIASTOLIC BLOOD PRESSURE: 81 MMHG | WEIGHT: 193 LBS | BODY MASS INDEX: 32.15 KG/M2 | HEIGHT: 65 IN | OXYGEN SATURATION: 97 % | HEART RATE: 74 BPM

## 2023-12-12 DIAGNOSIS — Z13.9 SCREENING FOR CONDITION: ICD-10-CM

## 2023-12-12 DIAGNOSIS — N93.9 ABNORMAL UTERINE BLEEDING: Primary | ICD-10-CM

## 2023-12-12 DIAGNOSIS — F17.200 TOBACCO DEPENDENCE SYNDROME: ICD-10-CM

## 2023-12-12 PROCEDURE — 81025 URINE PREGNANCY TEST: CPT

## 2023-12-12 PROCEDURE — 99213 OFFICE O/P EST LOW 20 MIN: CPT | Mod: GC

## 2023-12-12 RX ORDER — NICOTINE 21 MG/24HR
1 PATCH, TRANSDERMAL 24 HOURS TRANSDERMAL EVERY 24 HOURS
Qty: 28 PATCH | Refills: 3 | Status: SHIPPED | OUTPATIENT
Start: 2023-12-12

## 2023-12-12 RX ORDER — NORELGESTROMIN AND ETHINYL ESTRADIOL 35; 150 UG/MG; UG/MG
1 PATCH TRANSDERMAL WEEKLY
Qty: 3 PATCH | Refills: 1 | Status: SHIPPED | OUTPATIENT
Start: 2023-12-12 | End: 2024-02-05

## 2023-12-12 ASSESSMENT — PATIENT HEALTH QUESTIONNAIRE - PHQ9: SUM OF ALL RESPONSES TO PHQ QUESTIONS 1-9: 0

## 2023-12-12 NOTE — PROGRESS NOTES
Preceptor Attestation:   Patient seen, evaluated and discussed with the resident Dr. Fernanda Buckley. I have verified the content of the note, which accurately reflects my assessment of the patient and the plan of care.    Multiple risk factors to combine contraception (smoking, BMI, hx migraines) however given her preferences, patch reasonable with overall lower estrogen dose.     Supervising Physician:  Benjamin Rosenstein, MD, MA  [facility] Faculty  [facility]

## 2023-12-12 NOTE — PROGRESS NOTES
"  Assessment & Plan     (N93.9) Abnormal uterine bleeding  (primary encounter diagnosis)  Comment: Abnormal uterine bleeding likely due to the progesterone birth control. Discussed shared decision making with patient, as she is strongly against implants (IUD, Nexplanon) and Depo shot at this time (though would consider this again later if needed). Preferred methods are OCPs. Given that she has had good effects with patch, will go back to that at this time  Discussed that this puts her at higher risk for blood clots. Patient electing to stop smoking at this time, will send for nicotine patches to help with tobacco cessation. Plan to start Xulane patches at this time. CBC, UPT ordered, patient to come back in tomorrow for draw as the lab is currently closed today. Follow up in 1 month if not improving. Precautions given for headaches, migraines, and stroke/clot symptoms. Low threshold for TVUS.  Plan: CBC with Platelets & Differential,         norelgestromin-ethinyl estradiol (ORTHO EVRA)         150-35 MCG/24HR patch          (Z13.9) Screening for condition  Comment: As above. Plan for nursing visit tomorrow for labs.  Plan: HCG qualitative urine, CBC with Platelets &         Differential          (F17.200) Tobacco dependence syndrome  Comment: Patches ordered as above.  Plan: nicotine (NICODERM CQ) 14 MG/24HR 24 hr patch             BMI:   Estimated body mass index is 32.12 kg/m  as calculated from the following:    Height as of this encounter: 1.651 m (5' 5\").    Weight as of this encounter: 87.5 kg (193 lb).   Weight management plan: Discussed healthy diet and exercise guidelines        No follow-ups on file.    Fernanda Buckley MD  [facility]  Precepted patient with Dr. Benjamin Rosenstein.      Davon Barclay is a 37 year old, presenting for the following health issues:  Contraception (Side effects, on pills )        12/12/2023     4:10 PM   Additional Questions   Roomed by marquita" "candi       HPI     Here for side effect of birth control  Has been taking norethindrone tablets 0.35mg for the last 3 months  [facility]  Has been having back pain, bleeding on and off since starting medication  Was on the birth control patch years ago, Depo shot before that, does not want implants  Switched over because of history of smoking (smokes 1 ppd)  Has been taking one daily every day, has been consistent with this  Missed ONE day of taking these medications middle of November, but that was it    Has been having intercourse with males, last time was , unprotected  Same partner for the last 3 months, celibate for 2.5 years  No vaginal discharge, odor at all    Was having regular periods while on the patch  Menses were regular, then they stopped becoming regular after starting Micronor  Amenorrheic while on Depo shot for 5 years  Is having headaches, mild cramping, similar to having periods, back pain as well  Was not having headaches prior to using this medication  No family history of breast, ovarian, endometrial cancer  Grandmother  from colon cancer    HPV negative at last check in , retest later        Review of Systems   Constitutional, HEENT, cardiovascular, pulmonary, gi and gu systems are negative, except as otherwise noted.      Objective    /81   Pulse 74   Ht 1.651 m (5' 5\")   Wt 87.5 kg (193 lb)   SpO2 97%   BMI 32.12 kg/m    Body mass index is 32.12 kg/m .    Physical Exam   General: Alert, no acute distress  Skin: clean, dry, and intact  HEENT: normocephalic, atraumatic, spontaneous eye movement, no injection or icterus, ears and nose normal, no neck masses  Resp: normal work of breathing, no wheezing  Cardio: RRR, S1 and S2 present  Abdomen: soft, nontender, nondistended, BS present x 4, no masses.  Extremities: no erythema, no edema  Neuro: CN II-XII grossly intact, gait normal  Psych: normal mood, normal affect          ----- Service " Performed and Documented by Resident or Fellow ------

## 2023-12-13 LAB — HCG UR QL: NEGATIVE

## 2024-02-05 DIAGNOSIS — N93.9 ABNORMAL UTERINE BLEEDING: ICD-10-CM

## 2024-02-05 RX ORDER — NORELGESTROMIN AND ETHINYL ESTRADIOL 150; 35 UG/D; UG/D
PATCH TRANSDERMAL
Qty: 3 PATCH | Refills: 3 | Status: SHIPPED | OUTPATIENT
Start: 2024-02-05 | End: 2024-05-28

## 2024-04-11 ENCOUNTER — OFFICE VISIT (OUTPATIENT)
Dept: FAMILY MEDICINE | Facility: CLINIC | Age: 39
End: 2024-04-11
Payer: COMMERCIAL

## 2024-04-11 VITALS
DIASTOLIC BLOOD PRESSURE: 90 MMHG | BODY MASS INDEX: 30.26 KG/M2 | WEIGHT: 192.8 LBS | HEART RATE: 66 BPM | OXYGEN SATURATION: 98 % | HEIGHT: 67 IN | SYSTOLIC BLOOD PRESSURE: 137 MMHG | TEMPERATURE: 98 F

## 2024-04-11 DIAGNOSIS — Z11.3 SCREEN FOR STD (SEXUALLY TRANSMITTED DISEASE): ICD-10-CM

## 2024-04-11 DIAGNOSIS — N76.0 ACUTE VAGINITIS: Primary | ICD-10-CM

## 2024-04-11 DIAGNOSIS — N89.8 VAGINAL DISCHARGE: ICD-10-CM

## 2024-04-11 DIAGNOSIS — Z11.3 SCREENING EXAMINATION FOR VENEREAL DISEASE: ICD-10-CM

## 2024-04-11 LAB
CLUE CELLS: PRESENT
HCG UR QL: NEGATIVE
TRICHOMONAS, WET PREP: ABNORMAL
WBC'S/HIGH POWER FIELD, WET PREP: ABNORMAL
YEAST, WET PREP: ABNORMAL

## 2024-04-11 PROCEDURE — 81025 URINE PREGNANCY TEST: CPT

## 2024-04-11 PROCEDURE — 87591 N.GONORRHOEAE DNA AMP PROB: CPT

## 2024-04-11 PROCEDURE — 87491 CHLMYD TRACH DNA AMP PROBE: CPT

## 2024-04-11 PROCEDURE — 99213 OFFICE O/P EST LOW 20 MIN: CPT | Mod: GC

## 2024-04-11 PROCEDURE — 87210 SMEAR WET MOUNT SALINE/INK: CPT

## 2024-04-11 NOTE — PROGRESS NOTES
"  Assessment & Plan   Ning Giraldo is 38 year old female here for vaginal itching.     Vaginitis   Vaginal Discharge   Recent diagnosis of vaginal candida at outside clinic; completed treatment. Symptoms initially improved, but now presenting with persistent vaginal itching. Will repeat wet prep. Agreeable to UPT and STI screening as well.   Orders Placed This Encounter   Procedures    HCG qualitative urine    Vaginal-Chlamydia trachomatis/Neisseria gonorrhoeae by PCR    Vagina-Wet preparation     No follow-ups on file.    Subjective   Ning is a 38 year old, presenting for the following health issues:  Vaginal Problem (Was treated somewhere else still having symptoms no other concerns)        4/11/2024    11:22 AM   Additional Questions   Roomed by Evelina         4/11/2024    Information    services provided? No     HPI   [facility]   - was treated for yeast infection, initially got a little better   - no symptoms  - less discharge now, but persistent itching   - no new sexual partners, but not using protection   - no pain with sex   - no pain with urination   - on patch for birth control   - agreeable to UPT     ROS negative aside from those concerns noted in the HPI and A+P above.         Objective    BP (!) 137/90   Pulse 66   Temp 98  F (36.7  C)   Ht 1.708 m (5' 7.24\")   Wt 87.5 kg (192 lb 12.8 oz)   SpO2 98%   BMI 29.98 kg/m    Body mass index is 29.98 kg/m .  Physical Exam   General: Sitting comfortably. No acute distress. Very pleasant.   Respiratory: No respiratory distress.  Extremities: Upper and lower extremities grossly normal.  Skin: Skin in warm without rashes.  Neurological: Motor function is grossly normal.  Psychiatric: Good insight.    Signed Electronically by: Marva Rosenthal MD    "

## 2024-04-11 NOTE — PROGRESS NOTES
Preceptor Attestation:  Patient's case reviewed and discussed with Marva Rosenthal MD resident and I evaluated the patient. I agree with written assessment and plan of care.  Supervising Physician:  SAL CABEZAS MD  [facility]

## 2024-04-11 NOTE — PATIENT INSTRUCTIONS
Safer Sex: Care Instructions  Overview  Safer sex is a way to reduce your risk of getting a sexually transmitted infection (STI). It can also help prevent pregnancy.  Several products can help you practice safer sex and reduce your chance of STIs. One of the best is a condom. There are internal and external condoms. You can use a special rubber sheet (dental dam) for protection during oral sex. Disposable gloves can keep your hands from touching blood, semen, or other body fluids that can carry infections.  Remember that birth control methods such as diaphragms, IUDs, foams, and birth control pills do not stop you from getting STIs.  Follow-up care is a key part of your treatment and safety. Be sure to make and go to all appointments, and call your doctor if you are having problems. It's also a good idea to know your test results and keep a list of the medicines you take.  How can you care for yourself at home?  Think about getting vaccinated to help prevent hepatitis A, hepatitis B, and human papillomavirus (HPV). They can be spread through sex.  Use a condom every time you have sex. Use an external condom, which goes on the penis. Or use an internal condom, which goes into the vagina or anus.  Make sure you use the right size external condom. A condom that's too small can break easily. A condom that's too big can slip off during sex.  Use a new condom each time you have sex. Be careful not to poke a hole in the condom when you open the wrapper.  Don't use an internal condom and an external condom at the same time.  Never use petroleum jelly (such as Vaseline), grease, hand lotion, baby oil, or anything with oil in it. These products can make holes in the condom.  After intercourse, hold the edge of the condom as you remove it. This will help keep semen from spilling out of the condom.  Do not have sex with anyone who has symptoms of an STI, such as sores on the genitals or mouth.  Do not drink a lot of alcohol or  "use drugs before sex.  Limit your sex partners. Sex with one partner who has sex only with you can reduce your risk of getting an STI.  Don't share sex toys. But if you do share them, use a condom and clean the sex toys between each use.  Talk to any partners before you have sex. Talk about what you feel comfortable with and whether you have any boundaries with sex. And find out if your partner or partners may be at risk for any STI. Keep in mind that a person may be able to spread an STI even if they do not have symptoms. You and any partners may want to get tested for STIs.  Where can you learn more?  Go to https://www.[employer].net/patiented  Enter B608 in the search box to learn more about \"Safer Sex: Care Instructions.\"  Current as of: November 27, 2023               Content Version: 14.0    [id number] [employer].   Care instructions adapted under license by your healthcare professional. If you have questions about a medical condition or this instruction, always ask your healthcare professional. [employer], [employer] disclaims any warranty or liability for your use of this information.      "

## 2024-04-12 LAB
C TRACH DNA SPEC QL PROBE+SIG AMP: NEGATIVE
N GONORRHOEA DNA SPEC QL NAA+PROBE: NEGATIVE

## 2024-04-15 RX ORDER — METRONIDAZOLE 500 MG/1
500 TABLET ORAL 2 TIMES DAILY
Qty: 14 TABLET | Refills: 0 | Status: SHIPPED | OUTPATIENT
Start: 2024-04-15 | End: 2024-04-22

## 2024-05-25 DIAGNOSIS — N93.9 ABNORMAL UTERINE BLEEDING: ICD-10-CM

## 2024-05-28 RX ORDER — NORELGESTROMIN AND ETHINYL ESTRADIOL 150; 35 UG/D; UG/D
PATCH TRANSDERMAL
Qty: 3 PATCH | Refills: 3 | Status: SHIPPED | OUTPATIENT
Start: 2024-05-28

## 2024-05-29 ENCOUNTER — OFFICE VISIT (OUTPATIENT)
Dept: FAMILY MEDICINE | Facility: CLINIC | Age: 39
End: 2024-05-29
Payer: COMMERCIAL

## 2024-05-29 VITALS
TEMPERATURE: 98.9 F | BODY MASS INDEX: 32.02 KG/M2 | DIASTOLIC BLOOD PRESSURE: 90 MMHG | RESPIRATION RATE: 16 BRPM | WEIGHT: 204 LBS | HEART RATE: 66 BPM | SYSTOLIC BLOOD PRESSURE: 147 MMHG | OXYGEN SATURATION: 100 % | HEIGHT: 67 IN

## 2024-05-29 DIAGNOSIS — N92.1 MENORRHAGIA WITH IRREGULAR CYCLE: Primary | ICD-10-CM

## 2024-05-29 LAB
ERYTHROCYTE [DISTWIDTH] IN BLOOD BY AUTOMATED COUNT: 13.5 % (ref 10–15)
HCT VFR BLD AUTO: 39.3 % (ref 35–47)
HGB BLD-MCNC: 12.3 G/DL (ref 11.7–15.7)
MCH RBC QN AUTO: 29 PG (ref 26.5–33)
MCHC RBC AUTO-ENTMCNC: 31.3 G/DL (ref 31.5–36.5)
MCV RBC AUTO: 93 FL (ref 78–100)
PLATELET # BLD AUTO: 303 10E3/UL (ref 150–450)
RBC # BLD AUTO: 4.24 10E6/UL (ref 3.8–5.2)
WBC # BLD AUTO: 4.1 10E3/UL (ref 4–11)

## 2024-05-29 PROCEDURE — 99213 OFFICE O/P EST LOW 20 MIN: CPT | Mod: GC | Performed by: MASSAGE THERAPIST

## 2024-05-29 PROCEDURE — 85027 COMPLETE CBC AUTOMATED: CPT | Performed by: MASSAGE THERAPIST

## 2024-05-29 PROCEDURE — 36415 COLL VENOUS BLD VENIPUNCTURE: CPT | Performed by: MASSAGE THERAPIST

## 2024-05-29 ASSESSMENT — PATIENT HEALTH QUESTIONNAIRE - PHQ9
10. IF YOU CHECKED OFF ANY PROBLEMS, HOW DIFFICULT HAVE THESE PROBLEMS MADE IT FOR YOU TO DO YOUR WORK, TAKE CARE OF THINGS AT HOME, OR GET ALONG WITH OTHER PEOPLE: NOT DIFFICULT AT ALL
SUM OF ALL RESPONSES TO PHQ QUESTIONS 1-9: 6
SUM OF ALL RESPONSES TO PHQ QUESTIONS 1-9: 6

## 2024-05-29 NOTE — PROGRESS NOTES
"  Assessment & Plan     Menorrhagia with irregular cycle  Breakthrough bleeding for the last 2 cycles.  Most recent cycle with 3 weeks of continuous bleeding.  Despite wearing birth control patch.  We did discuss that patient is technically over the weight requirement for the patch.  However, she has used the patch for a long time, even when she was at a heavier weight and had normal cycles.  Does have a family history of fibroids.  Hemoglobin reassuring today.  Will send for ultrasound to evaluate for fibroids.  If ultrasound is benign consider endometrial biopsy versus changing contraception option.  - CBC with platelets; Future  - CBC with platelets  - US Pelvic Complete with Transvaginal; Future      No follow-ups on file.    Subjective   Ning is a 38 year old, presenting for the following health issues:  Vaginal Bleeding (Comes in 4 days stopped and comes back. This month has been bleeding without any breaks )    HPI   Last seen  with Dr. Rosenthal with BV, course of abx sent.   Since that time,     LMP Patient's last menstrual period was 2024 (approximate).  Starting to have more irregular periods since April. Bleeding for a few days, stops for a few days, then bleeds again for a few days  Now bleeding for 3 weeks - heavy bleeding   Cycles were initially better on the patch  Does have cramps, worse than normal   Fatigue, headaches     Has used a new soap     Sister has fibroids     OB history:     Patch- off 2 days ago                 Objective    BP (!) 147/90   Pulse 66   Temp 98.9  F (37.2  C) (Tympanic)   Resp 16   Ht 1.69 m (5' 6.54\")   Wt 92.5 kg (204 lb)   LMP 2024 (Approximate)   SpO2 100%   BMI 32.40 kg/m    Body mass index is 32.4 kg/m .  Physical Exam   GENERAL: healthy, alert and no distress  RESP: speaking in full sentences, normal work of breathing   CV: extremities appear well perfused  ABDOMEN: nondistended, no palpable mass  MS: no gross musculoskeletal defects " noted  PSYCH: mentation appears normal, affect normal/bright      Results for orders placed or performed in visit on 05/29/24 (from the past 24 hour(s))   CBC with platelets   Result Value Ref Range    WBC Count 4.1 4.0 - 11.0 10e3/uL    RBC Count 4.24 3.80 - 5.20 10e6/uL    Hemoglobin 12.3 11.7 - 15.7 g/dL    Hematocrit 39.3 35.0 - 47.0 %    MCV 93 78 - 100 fL    MCH 29.0 26.5 - 33.0 pg    MCHC 31.3 (L) 31.5 - 36.5 g/dL    RDW 13.5 10.0 - 15.0 %    Platelet Count 303 150 - 450 10e3/uL           Signed Electronically by: Miki Woodall MD

## 2024-05-29 NOTE — PROGRESS NOTES
Preceptor Attestation:   Patient seen, evaluated and discussed with the resident Dr. Miki Woodall. I have verified the content of the note, which accurately reflects my assessment of the patient and the plan of care.    Supervising Physician:  Benjamin Rosenstein, MD, MA  [facility] Faculty  [facility]

## 2024-05-30 ENCOUNTER — ANCILLARY PROCEDURE (OUTPATIENT)
Dept: ULTRASOUND IMAGING | Facility: CLINIC | Age: 39
End: 2024-05-30
Attending: FAMILY MEDICINE
Payer: COMMERCIAL

## 2024-05-30 DIAGNOSIS — N92.1 MENORRHAGIA WITH IRREGULAR CYCLE: ICD-10-CM

## 2024-05-30 PROCEDURE — 76830 TRANSVAGINAL US NON-OB: CPT | Mod: TC | Performed by: RADIOLOGY

## 2024-05-30 PROCEDURE — 76856 US EXAM PELVIC COMPLETE: CPT | Mod: TC | Performed by: RADIOLOGY

## 2024-05-31 DIAGNOSIS — N92.1 MENORRHAGIA WITH IRREGULAR CYCLE: Primary | ICD-10-CM

## 2024-06-03 NOTE — PROGRESS NOTES
"  Assessment & Plan     Abnormal uterine bleeding  US with possible polyp, needs to be repeat in 6 weeks. With ongoing bleeding and risk factors (hx of obesity, family hx of ovarian cancer) discussed endometrial biopsy today, which patient is in agreement with. See procedure note. If normal results, plan to repeat US vs progesterone withdrawal bleed.   - TSH with free T4 reflex; Future  - Ferritin; Future  - Glucose; Future  - Surgical Pathology Exam  - Endometrial Biopsy without Cervical Dilation [[id number]]        No follow-ups on file.        Subjective   Ning is a 38 year old, presenting for the following health issues:  RECHECK (US) and Headache (Last 2-3 days no meds)    HPI     Heavy menstrual bleeding / Irregular bleeding    visit- heavy and irregular bleeding   Last seen by me 24 sent for US which showed possible polyp, but not fully visualized. Recommended repeat imaging in 6-8 weeks.     Patient reports,   Bleeding has slowed down last few days   Now spotting   Cramping improved    History of obesity (estrogen exposure)   Sister with ovarian cancer- - 40 years old             Objective    /89   Pulse 74   Temp 97.8  F (36.6  C)   Resp 16   Ht 1.69 m (5' 6.54\")   Wt 89 kg (196 lb 1.9 oz)   LMP 2024 (Approximate)   SpO2 97%   BMI 31.14 kg/m    Body mass index is 31.14 kg/m .  Physical Exam   GENERAL: healthy, alert and no distress  RESP: speaking in full sentences, normal work of breathing   CV: extremities appear well perfused  ABDOMEN: nondistended   MS: no gross musculoskeletal defects noted  PSYCH: mentation appears normal, affect normal/bright            Signed Electronically by: Miki Woodall MD    "

## 2024-06-05 ENCOUNTER — OFFICE VISIT (OUTPATIENT)
Dept: FAMILY MEDICINE | Facility: CLINIC | Age: 39
End: 2024-06-05
Payer: COMMERCIAL

## 2024-06-05 VITALS
HEART RATE: 74 BPM | BODY MASS INDEX: 30.78 KG/M2 | RESPIRATION RATE: 16 BRPM | DIASTOLIC BLOOD PRESSURE: 89 MMHG | OXYGEN SATURATION: 97 % | WEIGHT: 196.12 LBS | HEIGHT: 67 IN | TEMPERATURE: 97.8 F | SYSTOLIC BLOOD PRESSURE: 139 MMHG

## 2024-06-05 DIAGNOSIS — N93.9 ABNORMAL UTERINE BLEEDING: Primary | ICD-10-CM

## 2024-06-05 PROCEDURE — 58100 BIOPSY OF UTERUS LINING: CPT | Mod: GC | Performed by: MASSAGE THERAPIST

## 2024-06-05 PROCEDURE — 88305 TISSUE EXAM BY PATHOLOGIST: CPT | Performed by: PATHOLOGY

## 2024-06-05 NOTE — PROGRESS NOTES
Faculty Supervision of Residents   I have examined this patient and the medical care has been evaluated and discussed with the resident. See resident note outlining our discussion.    I was present for entirety of uncomplicated endometrial biopsy as documented in this encounter.     Albina Velasquez MD

## 2024-06-05 NOTE — PROGRESS NOTES
"Endometrial Biopsy    Menstrual History:      2023     9:40 AM 2023     3:40 PM 2024     3:40 PM   Menstrual History   LAST MENSTRUAL PERIOD 3/24/2023 2023 2024       Time Out - \"Pause for the Cause\"  Just before the procedure begins, through verbal and active participation of team members, verify:                      Initials   Patient Name kb   Patient  2kb   Procedure to be performed kb                                                                                                                                      Indication: abnormal uterine bleeding, thickened endometrial stripe  Faculty:  I was present with the resident throughout the entire procedure.  Procedure uncomplicated    Consent: Verbal consent obtained from patient.   UPT not obtained.  Patient with recent US without evidence intrauterine pregnancy and on birth control    Using a medium Graves speculum, the cervix was visualized. The cervix was prepped with Betadine.  The endometrial pipelle was advanced through the cervix without difficulty and a sample collected. No additional pass was made.  The tenaculum was removed from the cervix and the tenaculum site made hemostatic with pressure.    EBL: minimal    Complications:  none  Pathology: EMB sample was sent to pathology.  Tolerance of Procedure:  Patient did tolerate the procedure well.     Patient was instructed to call if she experiences any heavy bleeding, severe cramping, or abnormal vaginal discharge.  May take ibuprofen 400-800 mg PO TID PRN or naproxen 500 mg PO BID for cramping.    Will notify patient of results.           "

## 2024-06-10 LAB
PATH REPORT.COMMENTS IMP SPEC: NORMAL
PATH REPORT.COMMENTS IMP SPEC: NORMAL
PATH REPORT.FINAL DX SPEC: NORMAL
PATH REPORT.GROSS SPEC: NORMAL
PATH REPORT.RELEVANT HX SPEC: NORMAL
PHOTO IMAGE: NORMAL

## 2024-06-25 ENCOUNTER — OFFICE VISIT (OUTPATIENT)
Dept: FAMILY MEDICINE | Facility: CLINIC | Age: 39
End: 2024-06-25
Payer: COMMERCIAL

## 2024-06-25 VITALS
RESPIRATION RATE: 20 BRPM | SYSTOLIC BLOOD PRESSURE: 147 MMHG | BODY MASS INDEX: 29.82 KG/M2 | TEMPERATURE: 97.7 F | OXYGEN SATURATION: 98 % | HEART RATE: 72 BPM | WEIGHT: 190 LBS | DIASTOLIC BLOOD PRESSURE: 86 MMHG | HEIGHT: 67 IN

## 2024-06-25 DIAGNOSIS — N30.00 ACUTE CYSTITIS WITHOUT HEMATURIA: Primary | ICD-10-CM

## 2024-06-25 DIAGNOSIS — N39.0 ACUTE UTI (URINARY TRACT INFECTION): ICD-10-CM

## 2024-06-25 DIAGNOSIS — R03.0 ELEVATED BLOOD PRESSURE READING WITHOUT DIAGNOSIS OF HYPERTENSION: ICD-10-CM

## 2024-06-25 LAB
ALBUMIN UR-MCNC: NEGATIVE MG/DL
APPEARANCE UR: CLEAR
BACTERIA #/AREA URNS HPF: ABNORMAL /HPF
BILIRUB UR QL STRIP: NEGATIVE
COLOR UR AUTO: YELLOW
GLUCOSE UR STRIP-MCNC: NEGATIVE MG/DL
HGB UR QL STRIP: ABNORMAL
KETONES UR STRIP-MCNC: NEGATIVE MG/DL
LEUKOCYTE ESTERASE UR QL STRIP: ABNORMAL
NITRATE UR QL: NEGATIVE
PH UR STRIP: 6.5 [PH] (ref 5–7)
RBC #/AREA URNS AUTO: ABNORMAL /HPF
SP GR UR STRIP: 1.01 (ref 1–1.03)
SQUAMOUS #/AREA URNS AUTO: ABNORMAL /LPF
UROBILINOGEN UR STRIP-ACNC: 0.2 E.U./DL
WBC #/AREA URNS AUTO: ABNORMAL /HPF

## 2024-06-25 PROCEDURE — 87086 URINE CULTURE/COLONY COUNT: CPT

## 2024-06-25 PROCEDURE — 87186 SC STD MICRODIL/AGAR DIL: CPT

## 2024-06-25 PROCEDURE — 81001 URINALYSIS AUTO W/SCOPE: CPT

## 2024-06-25 PROCEDURE — 99213 OFFICE O/P EST LOW 20 MIN: CPT | Mod: GC

## 2024-06-25 RX ORDER — NITROFURANTOIN 25; 75 MG/1; MG/1
100 CAPSULE ORAL 2 TIMES DAILY
Qty: 10 CAPSULE | Refills: 0 | Status: SHIPPED | OUTPATIENT
Start: 2024-06-25 | End: 2024-06-30

## 2024-06-25 NOTE — PROGRESS NOTES
"  Assessment & Plan     (N39.0) Urinary tract infection  (primary encounter diagnosis)  Comment: Patient has had 2 days of burning with urination with no systemic signs of illness including fever, chills, CVA tenderness, body aches.  Patient reports this feels like UTIs she has had in the past.  She states that this does not seem like her past experience with bacterial vaginosis even though she is having a little bit of vaginal itching.  Prefers to just do UTI testing at this time.  Will treat UTI if positive and if not we will have patient come back for a self swab lab only visit for potential bacterial vaginosis  Plan: UA Macroscopic with reflex to Microscopic and         Culture, Urine Microscopic Exam, Urine Culture            (R03.0) Elevated blood pressure reading without diagnosis of hypertension  Comment: 147/86 today without a diagnosis of hypertension.  On chart review patient's blood pressure has been elevated on past visits as well.  She should follow-up for a blood pressure check visit when she is feeling well  Plan:    -Follow-up for blood pressure check visit when feeling well          BMI  Estimated body mass index is 30.18 kg/m  as calculated from the following:    Height as of this encounter: 1.69 m (5' 6.54\").    Weight as of this encounter: 86.2 kg (190 lb).         No follow-ups on file.    Davon Barclay is a 38 year old, presenting for the following health issues:  UTI (Burning when pee and little bleeding)      6/25/2024     3:47 PM   Additional Questions   Roomed by Teofilo/Ariel         6/25/2024    Information    services provided? No        HPI         Monday - burning with urination   Sunday - peeing felt a little off and reduced    UTIs every few years.     Good energy. No body aches.     Vaginal bleeding but about due for Parekh.     Vaginal discharge - Itching.     Bacterial vaginosis. - treated for this 3 months ago.     No fever, chills,     Urine malodorous. " "          Objective    BP (!) 147/86 (BP Location: Right arm, Patient Position: Sitting)   Pulse 72   Temp 97.7  F (36.5  C) (Oral)   Resp 20   Ht 1.69 m (5' 6.54\")   Wt 86.2 kg (190 lb)   LMP 05/25/2024 (Approximate)   SpO2 98%   BMI 30.18 kg/m    Body mass index is 30.18 kg/m .  Physical Exam   GENERAL: Healthy, alert and no distress  EYES: Eyes grossly normal to inspection.  No discharge or erythema, or obvious scleral/conjunctival abnormalities.  RESP:  Lungs clear throughout. No wheeze or crackles.   CV: Heart RRR. No murmur  NEURO: Cranial nerves grossly intact.  Mentation and speech appropriate for age.  PSYCH: Mentation appears normal, affect normal/bright, judgement and insight intact, normal speech and appearance well-groomed.              Signed Electronically by: Jose Quiroz MD    "

## 2024-06-25 NOTE — PROGRESS NOTES
Preceptor Attestation:   Patient seen, evaluated and discussed with the resident. I have verified the content of the note, which accurately reflects my assessment of the patient and the plan of care.    Supervising Physician:Sherlyn Contreras MD    [facility]

## 2024-06-27 LAB — BACTERIA UR CULT: ABNORMAL

## 2024-07-25 ENCOUNTER — ANCILLARY PROCEDURE (OUTPATIENT)
Dept: ULTRASOUND IMAGING | Facility: CLINIC | Age: 39
End: 2024-07-25
Attending: MASSAGE THERAPIST
Payer: COMMERCIAL

## 2024-07-25 DIAGNOSIS — N92.1 MENORRHAGIA WITH IRREGULAR CYCLE: ICD-10-CM

## 2024-07-25 PROCEDURE — 76830 TRANSVAGINAL US NON-OB: CPT | Mod: TC | Performed by: RADIOLOGY

## 2024-07-25 PROCEDURE — 76856 US EXAM PELVIC COMPLETE: CPT | Mod: TC | Performed by: RADIOLOGY

## 2024-08-05 ENCOUNTER — OFFICE VISIT (OUTPATIENT)
Dept: FAMILY MEDICINE | Facility: CLINIC | Age: 39
End: 2024-08-05
Payer: COMMERCIAL

## 2024-08-05 VITALS
SYSTOLIC BLOOD PRESSURE: 139 MMHG | HEIGHT: 66 IN | BODY MASS INDEX: 31.82 KG/M2 | HEART RATE: 70 BPM | WEIGHT: 198 LBS | RESPIRATION RATE: 20 BRPM | DIASTOLIC BLOOD PRESSURE: 79 MMHG | TEMPERATURE: 98.3 F | OXYGEN SATURATION: 99 %

## 2024-08-05 DIAGNOSIS — R30.0 DYSURIA: Primary | ICD-10-CM

## 2024-08-05 DIAGNOSIS — N89.8 VAGINAL ITCHING: ICD-10-CM

## 2024-08-05 LAB
ALBUMIN UR-MCNC: NEGATIVE MG/DL
APPEARANCE UR: CLEAR
BACTERIA #/AREA URNS HPF: ABNORMAL /HPF
BILIRUB UR QL STRIP: NEGATIVE
CLUE CELLS: ABNORMAL
COLOR UR AUTO: YELLOW
GLUCOSE UR STRIP-MCNC: NEGATIVE MG/DL
HGB UR QL STRIP: NEGATIVE
KETONES UR STRIP-MCNC: NEGATIVE MG/DL
LEUKOCYTE ESTERASE UR QL STRIP: ABNORMAL
NITRATE UR QL: NEGATIVE
PH UR STRIP: 6.5 [PH] (ref 5–7)
RBC #/AREA URNS AUTO: ABNORMAL /HPF
SP GR UR STRIP: 1.01 (ref 1–1.03)
SQUAMOUS #/AREA URNS AUTO: ABNORMAL /LPF
TRICHOMONAS, WET PREP: ABNORMAL
UROBILINOGEN UR STRIP-ACNC: 2 E.U./DL
WBC #/AREA URNS AUTO: ABNORMAL /HPF
WBC'S/HIGH POWER FIELD, WET PREP: ABNORMAL
YEAST, WET PREP: ABNORMAL

## 2024-08-05 PROCEDURE — 99214 OFFICE O/P EST MOD 30 MIN: CPT | Performed by: STUDENT IN AN ORGANIZED HEALTH CARE EDUCATION/TRAINING PROGRAM

## 2024-08-05 PROCEDURE — 81001 URINALYSIS AUTO W/SCOPE: CPT | Performed by: STUDENT IN AN ORGANIZED HEALTH CARE EDUCATION/TRAINING PROGRAM

## 2024-08-05 PROCEDURE — 87491 CHLMYD TRACH DNA AMP PROBE: CPT | Performed by: STUDENT IN AN ORGANIZED HEALTH CARE EDUCATION/TRAINING PROGRAM

## 2024-08-05 PROCEDURE — 87591 N.GONORRHOEAE DNA AMP PROB: CPT | Performed by: STUDENT IN AN ORGANIZED HEALTH CARE EDUCATION/TRAINING PROGRAM

## 2024-08-05 PROCEDURE — 87210 SMEAR WET MOUNT SALINE/INK: CPT | Performed by: STUDENT IN AN ORGANIZED HEALTH CARE EDUCATION/TRAINING PROGRAM

## 2024-08-05 NOTE — PROGRESS NOTES
"  Assessment & Plan     Urinary tract infection  Vaginal itching  Patient has had 1.5 days of urgency and itching, but no vaginal symptoms or systemic signs of infection. Feels similar to start of UTI one month ago and has history of 3-4 UTIs in past year. UA unremarkable today, however plan to send for culture given recent UTI. Will await results from wet prep and G/C screening. Counseled on hygiene and avoiding irritants.  - UA Macroscopic with reflex to Microscopic and Culture  - UA Microscopic with Reflex to Culture  - Urine Culture  - Wet preparation  - Chlamydia trachomatis/Neisseria gonorrhoeae by PCR - Clinic Collect    Recent menorrhagia with irregular cycles  Patient had 3 weeks of continuous bleeding in May, no MP in June, and normal MP in July. Pelvic US on 7/25/24 revealed heterogeneous endometrium thickness up to 16 mm. Currently on birth control patch for control of menstrual cramps and heavy bleeding, but still smokes occasionally. Recommended patient consider other birth control options, such as Mirena. RTC in 2 months for follow-up and further IUD discussion.    BMI  Estimated body mass index is 31.96 kg/m  as calculated from the following:    Height as of this encounter: 1.676 m (5' 6\").    Weight as of this encounter: 89.8 kg (198 lb).   Weight management plan: Discussed healthy diet and exercise guidelines          No follow-ups on file.      Davon Barclay is a 38 year old, presenting for the following health issues:  RECHECK (UTI, also like to STD check if possible)        8/5/2024     3:32 PM   Additional Questions   Roomed by Jose   Accompanied by Daughter         8/5/2024    Information    services provided? No        HPI:    Possible UTI:  On night shift this past Saturday/Sunday - noticed urgency, itching, vaginal warmth  Denies blood in urine, pelvic/lower back pain, discharge changes  Urine had specks, malodorous  Has been drinking water/cranberry juice " "  Recent UTI a little over a month ago - took nitrofurantoin and felt 100% better  \"Always trying new soaps,\" forgot to use bathroom after intercourse last time  More sexually active now  Has had 3-4 UTIs since September     Irregular, heavy menstrual bleeding:  Tried Jarred ivory from [facility] in April  Heavy bleeding started in May, lasted entire month  Reminded her of miscarriage in 2018  No bleeding in June  Normal cycle in July    Has been on birth control patch on-and-off for 13 years  Started with OCP then transitioned to patch 1 year ago  Not interested in Nexplanon or Depo  Likely doesn't want any more children but not interested in hysterectomy    Still smoking, \"mostly quit\"    Interested in G/C screening today               Review of Systems:  10-point ROS neg, except as otherwise noted in HPI above.     Objective    /79   Pulse 70   Temp 98.3  F (36.8  C)   Resp 20   Ht 1.676 m (5' 6\")   Wt 89.8 kg (198 lb)   LMP 07/06/2024   SpO2 99%   BMI 31.96 kg/m    Body mass index is 31.96 kg/m .    Physical Exam     General: awake, conversant, NAD  CV: RRR, no peripheral edema  Resp: normal effort  GI: soft, nontender  Psych: alert and oriented x3        .kbmed  "

## 2024-08-06 LAB
C TRACH DNA SPEC QL PROBE+SIG AMP: NEGATIVE
N GONORRHOEA DNA SPEC QL NAA+PROBE: NEGATIVE

## 2024-08-08 NOTE — PATIENT INSTRUCTIONS
"  CHIEF COMPLAINT  Chief Complaint   Patient presents with    Other     X 1 week, tugging both ears, fuzzy, hitting his head,  had fever/nasal drainage     Subjective:   Ant Costa is a 13 m.o. male who presents to urgent care with mother.  She reports 1 week history of tugging on ears and seeming fussy.  She does report approximately 1 week ago patient having symptoms of congestion and fevers which is since resolved.  She denies any symptoms of vomiting or diarrhea.  Does report adequate oral intake.  Patient does have a history of prematurity at 31 weeks.  No other pertinent past medical history.        PAST MEDICAL HISTORY  Patient Active Problem List    Diagnosis Date Noted    Hypoxia 2023    Baby premature 31 weeks 2023       SURGICAL HISTORY  patient denies any surgical history    ALLERGIES  No Known Allergies    CURRENT MEDICATIONS  Home Medications       Reviewed by Mehrdad Black'ehsan (Medical Assistant) on 08/08/24 at 1210  Med List Status: <None>     Medication Last Dose Status   Pediatric Multivitamins-Iron (POLY VITS WITH IRON) 11 MG/ML Solution Taking Active                    SOCIAL HISTORY  Social History     Tobacco Use    Smoking status: Not on file    Smokeless tobacco: Not on file   Substance and Sexual Activity    Alcohol use: Not on file    Drug use: Not on file    Sexual activity: Not on file       FAMILY HISTORY  No family history on file.      Medications, Allergies, and current problem list reviewed today in Epic.     Objective:     Pulse 126   Temp 36.9 °C (98.4 °F) (Temporal)   Resp 28   Ht 0.768 m (2' 6.25\")   Wt 10 kg (22 lb 1.1 oz)   HC 48.9 cm (19.25\")   SpO2 97%     Physical Exam  Vitals reviewed.   Constitutional:       General: He is active. He is not in acute distress.     Appearance: Normal appearance. He is well-developed. He is not toxic-appearing.   HENT:      Head: Normocephalic.      Right Ear: Tympanic membrane normal. Tympanic membrane is not " Schedule a physical therapy assessment - call to schedule if you have not had a phone call in 2 business days    You may ice your knee for 20 minutes twice a day as needed    Tylenol or ibuprofen 2 tablets up to 3 times daily as needed      Return to clinic 3 weeks after your PT evaluation to discuss any further diagnostic and treatment steps   erythematous or bulging.      Left Ear: Tympanic membrane normal. Tympanic membrane is not erythematous or bulging.      Nose: Nose normal.      Mouth/Throat:      Mouth: Mucous membranes are moist. No oral lesions.      Palate: No lesions.      Pharynx: Oropharynx is clear.   Cardiovascular:      Rate and Rhythm: Normal rate and regular rhythm.      Pulses: Normal pulses.      Heart sounds: Normal heart sounds.   Pulmonary:      Effort: Pulmonary effort is normal. No respiratory distress, nasal flaring or retractions.      Breath sounds: Normal breath sounds. No stridor or decreased air movement. No wheezing, rhonchi or rales.   Abdominal:      General: Abdomen is flat. There is no distension.      Palpations: Abdomen is soft.   Musculoskeletal:         General: Normal range of motion.   Skin:     General: Skin is warm.      Capillary Refill: Capillary refill takes less than 2 seconds.   Neurological:      General: No focal deficit present.      Mental Status: He is alert.         Assessment/Plan:     Diagnosis and associated orders:     1. Ear pulling with normal exam           Comments/MDM:     Upon physical exam patient is alert and interactive appropriate for age.  Bilateral TMs are normal.  Mucous membranes are moist and clear.  He is clear to auscultation bilaterally.  No crackles, rhonchi or wheezes appreciated.  Normal respiratory effort.  Abdomen is flat, soft and nondistended.  Vital signs are stable in clinic.  Discussed overall reassuring physical exam findings with mother today in clinic.   Discussed normal follow-up with PCP.  Return to clinic if symptoms worsen or fail to resolve.         Differential diagnosis, natural history, supportive care, and indications for immediate follow-up discussed.    Advised the patient to follow-up with the primary care physician for recheck, reevaluation, and consideration of further management.    Please note that this dictation was created using voice recognition  software. I have made a reasonable attempt to correct obvious errors, but I expect that there are errors of grammar and possibly content that I did not discover before finalizing the note.    This note was electronically signed by PAT Chisholm

## 2024-09-15 ENCOUNTER — HEALTH MAINTENANCE LETTER (OUTPATIENT)
Age: 39
End: 2024-09-15

## 2024-11-01 ENCOUNTER — OFFICE VISIT (OUTPATIENT)
Dept: FAMILY MEDICINE | Facility: CLINIC | Age: 39
End: 2024-11-01
Payer: COMMERCIAL

## 2024-11-01 VITALS
SYSTOLIC BLOOD PRESSURE: 127 MMHG | HEART RATE: 77 BPM | OXYGEN SATURATION: 99 % | BODY MASS INDEX: 32.14 KG/M2 | DIASTOLIC BLOOD PRESSURE: 89 MMHG | WEIGHT: 200 LBS | TEMPERATURE: 98 F | HEIGHT: 66 IN

## 2024-11-01 DIAGNOSIS — N76.0 BV (BACTERIAL VAGINOSIS): ICD-10-CM

## 2024-11-01 DIAGNOSIS — N89.8 VAGINAL ITCHING: Primary | ICD-10-CM

## 2024-11-01 DIAGNOSIS — B96.89 BV (BACTERIAL VAGINOSIS): ICD-10-CM

## 2024-11-01 LAB
ALBUMIN UR-MCNC: NEGATIVE MG/DL
APPEARANCE UR: ABNORMAL
BACTERIA #/AREA URNS HPF: ABNORMAL /HPF
BILIRUB UR QL STRIP: NEGATIVE
CLUE CELLS: PRESENT
COLOR UR AUTO: ABNORMAL
GLUCOSE UR STRIP-MCNC: NEGATIVE MG/DL
HGB UR QL STRIP: NEGATIVE
KETONES UR STRIP-MCNC: NEGATIVE MG/DL
LEUKOCYTE ESTERASE UR QL STRIP: ABNORMAL
NITRATE UR QL: NEGATIVE
PH UR STRIP: 6.5 [PH] (ref 5–7)
RBC #/AREA URNS AUTO: ABNORMAL /HPF
SP GR UR STRIP: 1.02 (ref 1–1.03)
SQUAMOUS #/AREA URNS AUTO: ABNORMAL /LPF
TRICHOMONAS, WET PREP: ABNORMAL
UROBILINOGEN UR STRIP-ACNC: 0.2 E.U./DL
WBC #/AREA URNS AUTO: ABNORMAL /HPF
WBC'S/HIGH POWER FIELD, WET PREP: ABNORMAL
YEAST, WET PREP: ABNORMAL

## 2024-11-01 PROCEDURE — 87389 HIV-1 AG W/HIV-1&-2 AB AG IA: CPT

## 2024-11-01 PROCEDURE — 99214 OFFICE O/P EST MOD 30 MIN: CPT | Mod: GC

## 2024-11-01 PROCEDURE — 81001 URINALYSIS AUTO W/SCOPE: CPT

## 2024-11-01 PROCEDURE — 87210 SMEAR WET MOUNT SALINE/INK: CPT

## 2024-11-01 PROCEDURE — 36415 COLL VENOUS BLD VENIPUNCTURE: CPT

## 2024-11-01 PROCEDURE — 86780 TREPONEMA PALLIDUM: CPT

## 2024-11-01 RX ORDER — METRONIDAZOLE 500 MG/1
500 TABLET ORAL 2 TIMES DAILY
Qty: 14 TABLET | Refills: 0 | Status: SHIPPED | OUTPATIENT
Start: 2024-11-01 | End: 2024-11-08

## 2024-11-01 ASSESSMENT — PATIENT HEALTH QUESTIONNAIRE - PHQ9
SUM OF ALL RESPONSES TO PHQ QUESTIONS 1-9: 2
10. IF YOU CHECKED OFF ANY PROBLEMS, HOW DIFFICULT HAVE THESE PROBLEMS MADE IT FOR YOU TO DO YOUR WORK, TAKE CARE OF THINGS AT HOME, OR GET ALONG WITH OTHER PEOPLE: NOT DIFFICULT AT ALL
SUM OF ALL RESPONSES TO PHQ QUESTIONS 1-9: 2

## 2024-11-01 NOTE — PROGRESS NOTES
"  Assessment & Plan     Vaginal itching  Presenting w/ several days of dysuria and vaginal pruritus, similar to presentation of prior UTI's/chlamydia infx. Sexually active w/ male partners, recently told by a partner that he has had other partners. With this information along w/ mild symptoms would like UTI and STI testing done today.   - Treponema Abs w Reflex to RPR and Titer  - HIV Antigen Antibody Combo Cascade  - Wet preparation  - UA Macroscopic with reflex to Microscopic and Culture  - UA Microscopic with Reflex to Culture    No follow-ups on file.    Subjective   Ning is a 38 year old, presenting for the following health issues:  UTI (Pt thinking has UTI also concerned about BV)    HPI     37 yo F w/ pmh of MDD, elevated BMI, dysmenorrhea,     Concern for UTI  - Dysuria and vaginal pruritus for several days  - Male partner recently had other partners, would like STI testing  - Feels similar to previous UTIs as well as chlamydia infx that presented identical to these    Review of Systems  Constitutional, HEENT, cardiovascular, pulmonary, gi and gu systems are negative, except as otherwise noted.        Objective    /89   Pulse 77   Temp 98  F (36.7  C)   Ht 1.676 m (5' 6\")   Wt 90.7 kg (200 lb)   LMP 09/19/2024   SpO2 99%   BMI 32.28 kg/m    Body mass index is 32.28 kg/m .  Physical Exam   GENERAL: alert and no distress  NECK: no adenopathy, no asymmetry, masses, or scars  RESP: lungs clear to auscultation - no rales, rhonchi or wheezes  CV: regular rate and rhythm, normal S1 S2, no S3 or S4, no murmur, click or rub, no peripheral edema  ABDOMEN: soft, nontender, no hepatosplenomegaly, no masses and bowel sounds normal  MS: no gross musculoskeletal defects noted, no edema        Signed Electronically by: Carmelo Santoro MD    Answers submitted by the patient for this visit:  Patient Health Questionnaire (Submitted on 11/1/2024)  If you checked off any problems, how difficult have these " problems made it for you to do your work, take care of things at home, or get along with other people?: Not difficult at all  PHQ9 TOTAL SCORE: 2

## 2024-11-02 LAB
HIV 1+2 AB+HIV1 P24 AG SERPL QL IA: NONREACTIVE
T PALLIDUM AB SER QL: NONREACTIVE

## 2025-01-29 NOTE — PATIENT INSTRUCTIONS

## 2025-04-07 ENCOUNTER — PATIENT OUTREACH (OUTPATIENT)
Dept: CARE COORDINATION | Facility: CLINIC | Age: 40
End: 2025-04-07
Payer: COMMERCIAL

## 2025-04-18 ENCOUNTER — ANCILLARY PROCEDURE (OUTPATIENT)
Dept: MAMMOGRAPHY | Facility: CLINIC | Age: 40
End: 2025-04-18
Payer: COMMERCIAL

## 2025-04-18 DIAGNOSIS — Z12.31 VISIT FOR SCREENING MAMMOGRAM: ICD-10-CM

## 2025-04-18 PROCEDURE — 77063 BREAST TOMOSYNTHESIS BI: CPT

## 2025-05-20 ENCOUNTER — OFFICE VISIT (OUTPATIENT)
Dept: URGENT CARE | Facility: URGENT CARE | Age: 40
End: 2025-05-20
Payer: COMMERCIAL

## 2025-05-20 ENCOUNTER — RESULTS FOLLOW-UP (OUTPATIENT)
Dept: URGENT CARE | Facility: URGENT CARE | Age: 40
End: 2025-05-20

## 2025-05-20 VITALS
SYSTOLIC BLOOD PRESSURE: 143 MMHG | TEMPERATURE: 98 F | OXYGEN SATURATION: 100 % | DIASTOLIC BLOOD PRESSURE: 91 MMHG | HEART RATE: 84 BPM | RESPIRATION RATE: 16 BRPM

## 2025-05-20 DIAGNOSIS — R30.0 DYSURIA: Primary | ICD-10-CM

## 2025-05-20 LAB
ALBUMIN UR-MCNC: NEGATIVE MG/DL
APPEARANCE UR: CLEAR
BILIRUB UR QL STRIP: NEGATIVE
CLUE CELLS: ABNORMAL
COLOR UR AUTO: YELLOW
GLUCOSE UR STRIP-MCNC: NEGATIVE MG/DL
HCG UR QL: NEGATIVE
HGB UR QL STRIP: NEGATIVE
KETONES UR STRIP-MCNC: NEGATIVE MG/DL
LEUKOCYTE ESTERASE UR QL STRIP: NEGATIVE
NITRATE UR QL: NEGATIVE
PH UR STRIP: 6 [PH] (ref 5–8)
SP GR UR STRIP: 1.02 (ref 1–1.03)
TRICHOMONAS, WET PREP: ABNORMAL
UROBILINOGEN UR STRIP-ACNC: 1 E.U./DL
WBC'S/HIGH POWER FIELD, WET PREP: ABNORMAL
YEAST, WET PREP: ABNORMAL

## 2025-05-20 PROCEDURE — 3077F SYST BP >= 140 MM HG: CPT

## 2025-05-20 PROCEDURE — 81025 URINE PREGNANCY TEST: CPT

## 2025-05-20 PROCEDURE — 87210 SMEAR WET MOUNT SALINE/INK: CPT

## 2025-05-20 PROCEDURE — 87491 CHLMYD TRACH DNA AMP PROBE: CPT

## 2025-05-20 PROCEDURE — 87591 N.GONORRHOEAE DNA AMP PROB: CPT

## 2025-05-20 PROCEDURE — 3080F DIAST BP >= 90 MM HG: CPT

## 2025-05-20 PROCEDURE — 99213 OFFICE O/P EST LOW 20 MIN: CPT

## 2025-05-20 PROCEDURE — 81003 URINALYSIS AUTO W/O SCOPE: CPT

## 2025-05-20 NOTE — PROGRESS NOTES
"  Assessment & Plan     Dysuria  39-year-old female with a history of dysmenorrhea currently not on birth control here for mild urinary symptoms including increased frequency, suprapubic discomfort.  On exam she is vitally stable and not tachycardic or febrile.  Her physical exam is unremarkable.  No CVA tenderness.  No suprapubic tenderness.  She is well-appearing.  UA noninfectious and without hematuria.  hCG negative.  Wet prep with only 1+ white blood cell.  No clue cells, yeast or trichomonas identified.  Gonorrhea chlamydia in process.  Unclear cause of her symptoms at this time.  Low suspicion for UTI, kidney stone given unremarkable UA without blood.  She is not pregnant.  Review of ultrasound from July 2024 for abnormal uterine bleeding , \"The previously described soft tissue structure within the endometrial canal is no longer visualized and was likely a blood clot rather than a polyp. There are no focal areas of endometrial thickening or vascularity on this exam .\"  Given she has no tenderness on exam there is also low concern for any acute pathology such as ovarian torsion, appendicitis.  Her urine culture from about a year ago did grow E. Coli.  She has had chlamydia in 2020.  If chlamydia gonorrhea testing comes back negative we discussed having her follow-up with her PCP.  Could consider A1c given prediabetic and presenting with urinary symptoms.  ED precautions reviewed.  - UA Macroscopic with reflex to Microscopic and Culture - Clinic Collect  - HCG qualitative urine  - Wet prep - Clinic Collect  - Chlamydia & Gonorrhea by PCR, GICH/Range - Clinic Collect      Davon Barclay is a 39 year old, presenting for the following health issues:  Urinary Problem (Sx started friday morning. Mild burning and urgency. Back pain and headache.)        5/20/2025     3:47 PM   Additional Questions   Roomed by Armen Lamb MA   Accompanied by Self     HPI      Pre-Provider Visit Orders- Urinalysis UA/UC  Patient " reports the following symptoms:  possible urinary tract infection (UTI)     Genitourinary - Female  Onset/Duration: 5 d  Description:   Painful urination (Dysuria): mild discomfort            Frequency: YES  Blood in urine (Hematuria): No  Delay in urine (Hesitency): No  Intensity: mild  Progression of Symptoms:  waxing and waning  Accompanying Signs & Symptoms:  Fever/chills: No  Flank pain: NO  Nausea and vomiting: YES x 1 but nausea not new   Vaginal symptoms: none  Abdominal/Pelvic Pain: No  History:   History of frequent UTI s: No, says 2 in the last year  History of kidney stones: No  Sexually Active: YES  Possibility of pregnancy: Don't Know  Precipitating or alleviating factors: None  Therapies tried and outcome: none    Urgency, burning. Appetite is good. Mild intermittent headache and back pain . Has had BV in past     LMP may 9. Sexualy active. Hx dysmenorrhea.  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      Objective    BP (!) 143/91   Pulse 84   Temp 98  F (36.7  C) (Oral)   Resp 16   SpO2 100%   There is no height or weight on file to calculate BMI.  Physical Exam   GENERAL: alert and no distress  EYES: Eyes grossly normal to inspection, PERRL and conjunctivae and sclerae normal  RESP: lungs clear to auscultation - no rales, rhonchi or wheezes  CV: regular rate and rhythm, normal S1 S2, no S3 or S4, no murmur, click or rub, no peripheral edema  ABDOMEN: soft, nontender, no hepatosplenomegaly, no masses and bowel sounds normal. No CVA tenderness  MS: no gross musculoskeletal defects noted, no edema  SKIN: no suspicious lesions or rashes  NEURO: Normal strength and tone, mentation intact and speech normal. No foal deficit     Results for orders placed or performed in visit on 05/20/25 (from the past 24 hours)   UA Macroscopic with reflex to Microscopic and Culture - Clinic Collect     Specimen: Urine, Clean Catch   Result Value Ref Range    Color Urine Yellow Colorless, Straw, Light Yellow, Yellow    Appearance Urine Clear Clear    Glucose Urine Negative Negative mg/dL    Bilirubin Urine Negative Negative    Ketones Urine Negative Negative mg/dL    Specific Gravity Urine 1.025 1.005 - 1.030    Blood Urine Negative Negative    pH Urine 6.0 5.0 - 8.0    Protein Albumin Urine Negative Negative mg/dL    Urobilinogen Urine 1.0 0.2, 1.0 E.U./dL    Nitrite Urine Negative Negative    Leukocyte Esterase Urine Negative Negative    Narrative    Microscopic not indicated   HCG qualitative urine   Result Value Ref Range    hCG Urine Qualitative Negative Negative   Wet prep - Clinic Collect    Specimen: Vagina; Swab   Result Value Ref Range    Trichomonas Absent Absent    Yeast Absent Absent    Clue Cells Absent Absent    WBCs/high power field 1+ (A) None           Signed Electronically by: Abhay Perry MD

## 2025-05-20 NOTE — PROGRESS NOTES
Urgent Care Clinic Visit    Chief Complaint   Patient presents with    Urinary Problem     Sx started friday morning. Mild burning and urgency. Back pain and headache.               5/20/2025     3:47 PM   Additional Questions   Roomed by Armen Lamb MA   Accompanied by Self     Pre-Provider Visit Orders- Urinalysis UA/UC  Patient reports the following symptoms:  possible urinary tract infection (UTI) , possible bladder infection , and discomfort, pain or burning with urination   Does the patient report any of the following symptoms: vaginal discharge, vaginal itching, possible yeast infection, has a urinary catheter in place, or unable to void in a specimen cup?  No          Armen Lamb MA on 5/20/2025 at 3:48 PM

## 2025-05-21 LAB
C TRACH DNA SPEC QL PROBE+SIG AMP: NEGATIVE
N GONORRHOEA DNA SPEC QL NAA+PROBE: NEGATIVE
SPECIMEN TYPE: NORMAL

## 2025-08-09 NOTE — PROGRESS NOTES
"Subjective   Patient ID: Katerina Abdi is a 77 y.o. female. They present today with a chief complaint of Urinary Problem (Just finished antibiotics a few days ago. States symptoms have returned. ).    History of Present Illness  HPI  2 Days of frequent uncomfortable urination. No blood noted in urine or stool. No abdominal pains. No back pain. No vaginal discharge or rash. No nausea, vomiting or diarrhea. No known exposures to STDs. Denies fevers or chills. No medications taken yet for symptoms.  Patient has been seen here recently for urinary symptoms and treated with 2 different antibiotics (nitrofurantoin then Augmentin) and was feeling well until approximately 2 days after finishing her last round of antibiotics.      Past Medical History  Allergies as of 08/09/2025 - Reviewed 08/09/2025   Allergen Reaction Noted    Cefprozil Unknown 08/18/2009    Estradiol Itching 01/03/2025    Indocin [indomethacin] Unknown 11/06/2024       Prescriptions Prior to Admission[1]     Medical History[2]    Surgical History[3]     reports that she has never smoked. She has never been exposed to tobacco smoke. She has never used smokeless tobacco. She reports current alcohol use. She reports that she does not use drugs.    Review of Systems  Review of Systems            As in history of present illness                   Objective    Vitals:    08/09/25 1316   BP: 160/80   Pulse: 69   Resp: 20   Temp: 36.7 °C (98.1 °F)   TempSrc: Oral   SpO2: 97%   Weight: 68 kg (150 lb)   Height: 1.651 m (5' 5\")     No LMP recorded (lmp unknown). Patient has had a hysterectomy.    Physical Exam  Alert and oriented, no apparent distress  Abdomen soft, nontender, nondistended.  No CVA tenderness  Skin shows no rashes sores or lesions   exam not indicated at this time  Procedures    Point of Care Test & Imaging Results from this visit  Results for orders placed or performed in visit on 08/09/25   POCT UA Automated manually resulted   Result Value " Preceptor Attestation:  Patient's case reviewed and discussed with  Patient seen and discussed with the resident..  I agree with written assessment and plan of care.  Supervising Physician:  Albina Velasquez MD  [facility]       Ref Range    POC Color, Urine Yellow Straw, Yellow, Light-Yellow    POC Appearance, Urine Cloudy (A) Clear    POC Glucose, Urine TRACE (A) NEGATIVE mg/dl    POC Bilirubin, Urine NEGATIVE NEGATIVE    POC Ketones, Urine NEGATIVE NEGATIVE mg/dl    POC Specific Gravity, Urine 1.015 1.005 - 1.035    POC Blood, Urine NEGATIVE NEGATIVE    POC PH, Urine 6.0 No Reference Range Established PH    POC Protein, Urine NEGATIVE NEGATIVE mg/dl    POC Urobilinogen, Urine 0.2 0.2, 1.0 EU/DL    Poc Nitrite, Urine NEGATIVE NEGATIVE    POC Leukocytes, Urine MODERATE (2+) (A) NEGATIVE      Imaging  No results found.    Cardiology, Vascular, and Other Imaging  No other imaging results found for the past 2 days      Diagnostic study results (if any) were reviewed by Karan Patton MD.    Assessment/Plan   Allergies, medications, history, and pertinent labs/EKGs/Imaging reviewed by Karan Patton MD.     Medical Decision Making  At time of discharge patient was clinically well-appearing and HDS for outpatient management. The patient and/or family was educated regarding diagnosis, supportive care, OTC and Rx medications. The patient and/or family was given the opportunity to ask questions prior to discharge.  They verbalized understanding of my discussion of the plans for treatment, expected course, indications to return to  or seek further evaluation in ED, and the need for timely follow up as directed.   They were provided with a work/school excuse if requested.    Orders and Diagnoses  Diagnoses and all orders for this visit:  Recurrent UTI  -     Urine Culture  -     sulfamethoxazole-trimethoprim (Bactrim DS) 800-160 mg tablet; Take 1 tablet by mouth 2 times a day for 7 days.  Frequency of micturition  -     POCT UA Automated manually resulted  -     Urine Culture  -     sulfamethoxazole-trimethoprim (Bactrim DS) 800-160 mg tablet; Take 1 tablet by mouth 2 times a day for 7 days.      Medical Admin Record      Patient disposition:  Home    Electronically signed by Karan Patton MD  1:34 PM           [1] (Not in a hospital admission)   [2]   Past Medical History:  Diagnosis Date    Fibroid     HYSTERECTOMY    Fractures     Right ankle    Hypertension     Joint pain     Two knee replacements    Urinary tract infection     Occasionally   [3]   Past Surgical History:  Procedure Laterality Date    ANKLE FRACTURE SURGERY  ?    CATARACT EXTRACTION  2021    COLONOSCOPY  2022    said more can't be done due to curvature of colon.    HYSTERECTOMY  1997    JOINT REPLACEMENT  2010, 2011    Knee replacements    OTHER SURGICAL HISTORY  2010,2011, ankle?, 1997    knee replacements, right ankle, hysterectomy    TONSILLECTOMY  about 3 years old

## 2025-08-19 ENCOUNTER — OFFICE VISIT (OUTPATIENT)
Dept: URGENT CARE | Facility: URGENT CARE | Age: 40
End: 2025-08-19
Payer: COMMERCIAL

## 2025-08-19 VITALS
DIASTOLIC BLOOD PRESSURE: 102 MMHG | RESPIRATION RATE: 18 BRPM | HEART RATE: 66 BPM | SYSTOLIC BLOOD PRESSURE: 156 MMHG | TEMPERATURE: 98.3 F

## 2025-08-19 DIAGNOSIS — M25.562 PATELLOFEMORAL ARTHRALGIA OF LEFT KNEE: ICD-10-CM

## 2025-08-19 DIAGNOSIS — R30.0 BURNING WITH URINATION: Primary | ICD-10-CM

## 2025-08-19 LAB
ALBUMIN UR-MCNC: NEGATIVE MG/DL
APPEARANCE UR: CLEAR
BILIRUB UR QL STRIP: NEGATIVE
CLUE CELLS: ABNORMAL
COLOR UR AUTO: YELLOW
GLUCOSE UR STRIP-MCNC: NEGATIVE MG/DL
HGB UR QL STRIP: NEGATIVE
KETONES UR STRIP-MCNC: NEGATIVE MG/DL
LEUKOCYTE ESTERASE UR QL STRIP: NEGATIVE
NITRATE UR QL: NEGATIVE
PH UR STRIP: 7 [PH] (ref 5–8)
SP GR UR STRIP: 1.01 (ref 1–1.03)
TRICHOMONAS, WET PREP: ABNORMAL
UROBILINOGEN UR STRIP-ACNC: 0.2 E.U./DL
WBC'S/HIGH POWER FIELD, WET PREP: ABNORMAL
YEAST, WET PREP: ABNORMAL

## 2025-08-19 PROCEDURE — 3077F SYST BP >= 140 MM HG: CPT

## 2025-08-19 PROCEDURE — 81003 URINALYSIS AUTO W/O SCOPE: CPT

## 2025-08-19 PROCEDURE — 87210 SMEAR WET MOUNT SALINE/INK: CPT

## 2025-08-19 PROCEDURE — 99213 OFFICE O/P EST LOW 20 MIN: CPT

## 2025-08-19 PROCEDURE — 3080F DIAST BP >= 90 MM HG: CPT

## 2025-08-19 PROCEDURE — 87491 CHLMYD TRACH DNA AMP PROBE: CPT

## 2025-08-19 PROCEDURE — 87591 N.GONORRHOEAE DNA AMP PROB: CPT

## 2025-08-20 LAB
C TRACH DNA SPEC QL NAA+PROBE: NEGATIVE
N GONORRHOEA DNA SPEC QL NAA+PROBE: NEGATIVE
SPECIMEN TYPE: NORMAL
SPECIMEN TYPE: NORMAL